# Patient Record
Sex: FEMALE | Race: WHITE | NOT HISPANIC OR LATINO | Employment: FULL TIME | URBAN - METROPOLITAN AREA
[De-identification: names, ages, dates, MRNs, and addresses within clinical notes are randomized per-mention and may not be internally consistent; named-entity substitution may affect disease eponyms.]

---

## 2017-01-05 ENCOUNTER — OFFICE VISIT (OUTPATIENT)
Dept: TRANSPLANT | Facility: CLINIC | Age: 62
End: 2017-01-05
Payer: MEDICAID

## 2017-01-05 VITALS
HEIGHT: 63 IN | WEIGHT: 121.94 LBS | DIASTOLIC BLOOD PRESSURE: 74 MMHG | SYSTOLIC BLOOD PRESSURE: 126 MMHG | HEART RATE: 102 BPM | BODY MASS INDEX: 21.61 KG/M2

## 2017-01-05 DIAGNOSIS — B25.9 CYTOMEGALOVIRUS INFECTION, UNSPECIFIED CYTOMEGALOVIRAL INFECTION TYPE: ICD-10-CM

## 2017-01-05 DIAGNOSIS — D84.9 IMMUNOSUPPRESSION: ICD-10-CM

## 2017-01-05 DIAGNOSIS — Z94.1 S/P ORTHOTOPIC HEART TRANSPLANT: Primary | ICD-10-CM

## 2017-01-05 DIAGNOSIS — Z94.1 HEART TRANSPLANTED: Primary | ICD-10-CM

## 2017-01-05 DIAGNOSIS — I10 ESSENTIAL HYPERTENSION: ICD-10-CM

## 2017-01-05 DIAGNOSIS — E03.9 HYPOTHYROIDISM (ACQUIRED): ICD-10-CM

## 2017-01-05 DIAGNOSIS — R91.8 OTHER NONSPECIFIC ABNORMAL FINDING OF LUNG FIELD: ICD-10-CM

## 2017-01-05 DIAGNOSIS — T86.21 ACUTE REJECTION OF HEART TRANSPLANT: ICD-10-CM

## 2017-01-05 LAB
DIASTOLIC DYSFUNCTION: NO
ESTIMATED PA SYSTOLIC PRESSURE: 23
RETIRED EF AND QEF - SEE NOTES: 65 (ref 55–65)
TRICUSPID VALVE REGURGITATION: NORMAL

## 2017-01-05 PROCEDURE — 93306 TTE W/DOPPLER COMPLETE: CPT | Mod: 26,,, | Performed by: INTERNAL MEDICINE

## 2017-01-05 PROCEDURE — 99213 OFFICE O/P EST LOW 20 MIN: CPT | Mod: PBBFAC | Performed by: NURSE PRACTITIONER

## 2017-01-05 PROCEDURE — 99999 PR PBB SHADOW E&M-EST. PATIENT-LVL III: CPT | Mod: PBBFAC,,, | Performed by: NURSE PRACTITIONER

## 2017-01-05 PROCEDURE — 99213 OFFICE O/P EST LOW 20 MIN: CPT | Mod: S$PBB,,, | Performed by: NURSE PRACTITIONER

## 2017-01-05 NOTE — MR AVS SNAPSHOT
[facility]  [address]  [place] [address]  Phone: [phone]                  Cassandra Gonzales   2017 1:30 PM   Appointment    Description:  Female : 1955   Provider:  La Ash NP   Department:  [facility]                To Do List           Future Appointments        Provider Department Dept Phone    2017 9:05 AM LAB, APPOINTMENT [place] [facility] [phone]    2017 1:00 PM ECHO, [facility] - Echo/Stress Lab [phone]    2017 1:30 PM La Ash NP [facility] [phone]    2017 8:30 AM LAB, APPOINTMENT [place] [facility] [phone]    2017 9:30 AM ECHO, [facility] - Echo/Stress Lab [phone]      Your Future Surgeries/Procedures     2017   Surgery with Alyssa Negron MD   [facility] ([facility])    [address] [facility] [address] [phone]              Goals (5 Years of Data)     None      [facility]     [facility] -  Assistance  Registered nurses in the [facility] provide clinical advisement, health education, appointment booking, and other advisory services.  Call for this free service at [phone].             Medications           Message regarding Medications     Verify the changes and/or additions to your medication regime listed below are the same as discussed with your clinician today.  If any of these changes or additions are incorrect, please notify your healthcare provider.             Verify that the below list of medications is an accurate representation of the medications you are currently taking.  If none reported, the list may be blank. If incorrect, please contact your healthcare provider. Carry this list with you in case of emergency.           Current Medications     amlodipine (NORVASC) 5 MG tablet  Take 1 tablet (5 mg total) by mouth once daily.    aspirin (ECOTRIN) 81 MG EC tablet Take 1 tablet (81 mg total) by mouth once daily.    atorvastatin (LIPITOR) 40 MG tablet Take 1 tablet (40 mg total) by mouth once daily.    levothyroxine (SYNTHROID) 75 MCG tablet Take 1 tablet (75 mcg total) by mouth before breakfast.    magnesium oxide (MAG-OX) 400 mg tablet Take 1 tablet (400 mg total) by mouth 2 (two) times daily.    multivitamin (THERAGRAN) tablet Take 1 tablet by mouth once daily.    mycophenolate (CELLCEPT) 250 mg Cap Take 4 capsules (1,000 mg total) by mouth 2 (two) times daily.    predniSONE (DELTASONE) 5 MG tablet Take 1 tablet (5 mg total) by mouth once daily. (START ON 12/17)    sulfamethoxazole-trimethoprim 400-80mg (BACTRIM,SEPTRA) 400-80 mg per tablet Take 1 tablet by mouth once daily. Z94.1 heart replaced by transplant    tacrolimus (PROGRAF) 1 MG Cap Take 2 capsules (2 mg total) by mouth every 12 (twelve) hours.    valganciclovir (VALCYTE) 450 mg Tab Take 1 tablet (450 mg total) by mouth once daily.           Clinical Reference Information           Vital Signs - Last Recorded     LMP                   03/01/2007           Allergies as of 1/5/2017     No Known Allergies      Immunizations Administered on Date of Encounter - 1/5/2017     None      Maintenance Dialysis History     Patient has no recorded history of maintenance dialysis.      Transplant Information        Txp Date Organ Coordinator Care Team    3/25/2016 Heart Samira Munson LPN Referring Physician:  Esha Tee MD   Corresponding Physician:  Esha Tee MD   Surgeon:  Yuriy Edge MD

## 2017-01-05 NOTE — LETTER
January 5, 2017        Esha Tee  [address]  [place] [address]  Phone: [phone]  Fax: [phone]             [facility] [address] [place] [address]  Phone: [phone]   Patient: Cassandra Gonzales   MR Number: [id number]   YOB: 1955   Date of Visit: 1/5/2017       Dear Dr. Esha Tee    Thank you for referring Cassandra Gonzales to me for evaluation. Attached you will find relevant portions of my assessment and plan of care.    If you have questions, please do not hesitate to call me. I look forward to following Cassandra Gonzales along with you.    Sincerely,    La Ash NP    Enclosure    If you would like to receive this communication electronically, please contact [email or web address] or [phone] to request [email or web address] Link access.    [email or web address] Link is a tool which provides read-only access to select patient information with whom you have a relationship. Its easy to use and provides real time access to review your patients record including encounter summaries, notes, results, and demographic information.    If you feel you have received this communication in error or would no longer like to receive these types of communications, please e-mail [email or web address]

## 2017-01-05 NOTE — PROGRESS NOTES
"Subjective:   Patient ID:  Cassandra Gonzales is a 61 y.o. female who presents for heart transplant follow-up.    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 She was asymptomatic and graft function was normal. She was given IV Solu Medrol 500 mg X 1. HTS staff and pathology staff reviewed the pathology, and there was no evidence of cellular necrosis, so she was sent home on a steroid pulse of Prednisone 30 mg bid X 2 days followed by 5 mg qd with EMBx done today likely to be followed by a slow taper. Decision was made to continue Valcyte for 3 more months (March) and Bactrim for 6 extra months (September). We are continuing to check CMV PCR's monthly. Random urine protein done today in anticipation of annual visit in March and is -ve (plan is for LHC with IVUS and consideration for Rapamune if intimal thickening is present. Rapamune would also help prevent CMV). She feels great, and has no complaints at all. Prograf continues at 2 mg bid, and Cellcept is now 1000 mg bid. TTE shows normal graft function. Labs are unremarkable.    Review of Systems   Constitution: Negative.   HENT: Negative.    Eyes: Negative.    Cardiovascular: Negative.    Respiratory: Negative.    Endocrine: Negative.    Hematologic/Lymphatic: Negative.    Skin: Negative.    Musculoskeletal: Negative.    Gastrointestinal: Negative.    Genitourinary: Negative.    Neurological: Negative.    Psychiatric/Behavioral: Negative.    Allergic/Immunologic: Negative.        Objective:     Visit Vitals    /74    Pulse 102    Ht 5' 3" (1.6 m)    Wt 55.3 kg (121 lb 14.6 oz)    LMP 03/01/2007    BMI 21.6 kg/m2 "       Physical Exam   Constitutional: She is oriented to person, place, and time. She appears well-developed and well-nourished.   HENT:   Head: Normocephalic and atraumatic.   Eyes: Conjunctivae and EOM are normal. Pupils are equal, round, and reactive to light.   Neck: Normal range of motion. Neck supple. No JVD present. No thyromegaly present.   Cardiovascular: Regular rhythm, normal heart sounds and intact distal pulses.    Tachycardic   Pulmonary/Chest: Effort normal and breath sounds normal.   Abdominal: Soft. Bowel sounds are normal.   Musculoskeletal: Normal range of motion. She exhibits no edema.   Neurological: She is alert and oriented to person, place, and time.   Skin: Skin is warm and dry.   Psychiatric: She has a normal mood and affect. Her behavior is normal. Judgment and thought content normal.         Chemistry        Component Value Date/Time     01/05/2017 0856    K 4.0 01/05/2017 0856     01/05/2017 0856    CO2 25 01/05/2017 0856    BUN 25 (H) 01/05/2017 0856    CREATININE 0.9 01/05/2017 0856    GLU 89 01/05/2017 0856        Component Value Date/Time    CALCIUM 9.2 01/05/2017 0856    ALKPHOS 94 01/05/2017 0856    AST 35 01/05/2017 0856    ALT 25 01/05/2017 0856    BILITOT 0.6 01/05/2017 0856            Magnesium   Date Value Ref Range Status   12/15/2016 1.7 1.6 - 2.6 mg/dL Final       Lab Results   Component Value Date    WBC 4.89 01/05/2017    HGB 11.0 (L) 01/05/2017    HCT 33.2 (L) 01/05/2017    MCV 92 01/05/2017     01/05/2017       Lab Results   Component Value Date    INR 1.0 10/27/2016    INR 1.0 10/26/2016    INR 1.0 10/25/2016       BNP   Date Value Ref Range Status   01/05/2017 75 0 - 99 pg/mL Final     Comment:     Values of less than 100 pg/ml are consistent with non-CHF populations.   12/01/2016 118 (H) 0 - 99 pg/mL Final     Comment:     Values of less than 100 pg/ml are consistent with non-CHF populations.   10/18/2016 50 0 - 99 pg/mL Final     Comment:      Values of less than 100 pg/ml are consistent with non-CHF populations.       No results found for: LDH    Tacrolimus Lvl   Date Value Ref Range Status   12/15/2016 10.2 5.0 - 15.0 ng/mL Final     Comment:     Testing performed by Liquid Chromatography-Tandem  Mass Spectrometry (LC-MS/MS).  This test was developed and its performance characteristics  determined by [facility], Department of Pathology  and Laboratory Medicine in a manner consistent with CLIA  requirements. It has not been cleared or approved by the [place]  Food and Drug Administration.  This test is used for clinical   purposes.  It should not be regarded as investigational or for  research.       No results found for: SIROLIMUS  No results found for: CYCLOSPORINE    Assessment:     1. Heart transplanted    2. Acute rejection of heart transplant    3. Cytomegalovirus infection, unspecified cytomegaloviral infection type    4. Immunosuppression    5. Hypothyroidism (acquired)    6. Essential hypertension        Plan:   Follow up today's immunosuppression levels with Tacro goal level 8-12. Will wean Prednisone if EMBx -ve since she is tolerating Cellcept 1000 mg bid.  RTC as scheduled for f/u with Dr. Negro 2/22/17.    Return instructions as set forth by post transplant schedule or as needed:    Clinic: Return for labs and/or biopsy weekly the first month, every two weeks during month 2 and then monthly for the first year at the provider or coordinator's discretion. During the second year, return to clinic every 3 months. Post transplant year 3-5 return every 6 months. There will be a comprehensive post transplant evaluation every year that may include LHC/RHC/biopsy, stress test, echo, CXR, and other health screening exams.    In addition to the clinical assessment, I have ordered Allomap testing for this patient to assist in identification of moderate/severe acute cellular rejection (ACR) in a pt with stable Allograft function instead of  endomyocardial biopsy.     Patient is reminded to call with any health changes as these can be early signs of transplant complications. Patient is advised to make sure any new medications or changes of old medications are discussed with a pharmacist or physician knowledgeable with transplant to avoid rejection/drug toxicity related to significant drug interactions.    UNOS Patient Status  Functional Status: 100% - Normal, no complaints, no evidence of disease  Physical Capacity: No Limitations  Working for Income: No  If no, reason not working: Unknown

## 2017-01-06 ENCOUNTER — TELEPHONE (OUTPATIENT)
Dept: TRANSPLANT | Facility: CLINIC | Age: 62
End: 2017-01-06

## 2017-01-06 NOTE — TELEPHONE ENCOUNTER
Called pt to inform her that her biopsy was negative from 1/5/17. Pt verbalized understanding. Informed pt that if there are any other updates we will call and instructed pt to call back if she has any questions or concerns.

## 2017-01-10 ENCOUNTER — TELEPHONE (OUTPATIENT)
Dept: TRANSPLANT | Facility: CLINIC | Age: 62
End: 2017-01-10

## 2017-01-17 ENCOUNTER — PATIENT MESSAGE (OUTPATIENT)
Dept: TRANSPLANT | Facility: CLINIC | Age: 62
End: 2017-01-17

## 2017-01-23 ENCOUNTER — PATIENT MESSAGE (OUTPATIENT)
Dept: TRANSPLANT | Facility: CLINIC | Age: 62
End: 2017-01-23

## 2017-01-23 RX ORDER — PREDNISONE 5 MG/1
2.5 TABLET ORAL DAILY
Qty: 30 TABLET | Refills: 0 | Status: SHIPPED | OUTPATIENT
Start: 2017-01-23 | End: 2017-02-22 | Stop reason: SDUPTHER

## 2017-01-25 ENCOUNTER — PATIENT MESSAGE (OUTPATIENT)
Dept: TRANSPLANT | Facility: CLINIC | Age: 62
End: 2017-01-25

## 2017-01-26 ENCOUNTER — TELEPHONE (OUTPATIENT)
Dept: TRANSPLANT | Facility: CLINIC | Age: 62
End: 2017-01-26

## 2017-01-30 ENCOUNTER — TELEPHONE (OUTPATIENT)
Dept: TRANSPLANT | Facility: CLINIC | Age: 62
End: 2017-01-30

## 2017-01-30 LAB
EXT ALBUMIN: 1.2
EXT ALT: 23
EXT AST: 36
EXT BUN: 22
EXT CALCIUM: 9.3
EXT CHLORIDE: 106
EXT CREATININE: 0.93 MG/DL
EXT GLUCOSE: 79
EXT HEMATOCRIT: 38.2
EXT HEMOGLOBIN: 12.3
EXT PLATELETS: 205
EXT POTASSIUM: 4.7
EXT PROTEIN TOTAL: 7.1
EXT SODIUM: 140 MMOL/L
EXT WBC: 4

## 2017-01-31 ENCOUNTER — PATIENT MESSAGE (OUTPATIENT)
Dept: TRANSPLANT | Facility: CLINIC | Age: 62
End: 2017-01-31

## 2017-01-31 ENCOUNTER — TELEPHONE (OUTPATIENT)
Dept: TRANSPLANT | Facility: CLINIC | Age: 62
End: 2017-01-31

## 2017-01-31 LAB — EXT TACROLIMUS LVL: 703

## 2017-02-01 ENCOUNTER — PATIENT MESSAGE (OUTPATIENT)
Dept: TRANSPLANT | Facility: CLINIC | Age: 62
End: 2017-02-01

## 2017-02-07 ENCOUNTER — TELEPHONE (OUTPATIENT)
Dept: TRANSPLANT | Facility: CLINIC | Age: 62
End: 2017-02-07

## 2017-02-07 LAB
EXT ALBUMIN: 4
EXT ALT: 27
EXT AST: 41
EXT BUN: 21
EXT CALCIUM: 9.8
EXT CHLORIDE: 105
EXT CREATININE: 0.9 MG/DL
EXT GLUCOSE: 87
EXT HEMATOCRIT: 37.1
EXT HEMOGLOBIN: 11.8
EXT PLATELETS: 195
EXT POTASSIUM: 4.5
EXT PROTEIN TOTAL: 7.1
EXT SODIUM: 142 MMOL/L
EXT TACROLIMUS LVL: 9.6
EXT WBC: 3.5

## 2017-02-07 NOTE — TELEPHONE ENCOUNTER
Pt had repeat labs on 2/6/17 for Prograf level of 7.3 on 1/31/17 goal is 8-10. Repeat prograf level is 9.6.   Waiting on CMV PCR, WBC dropped to 3.5

## 2017-02-09 ENCOUNTER — TELEPHONE (OUTPATIENT)
Dept: TRANSPLANT | Facility: CLINIC | Age: 62
End: 2017-02-09

## 2017-02-09 ENCOUNTER — PATIENT MESSAGE (OUTPATIENT)
Dept: TRANSPLANT | Facility: CLINIC | Age: 62
End: 2017-02-09

## 2017-02-09 LAB — EXT CMV DNA QUANT. BY PCR: NOT DETECTED

## 2017-02-12 ENCOUNTER — PATIENT MESSAGE (OUTPATIENT)
Dept: TRANSPLANT | Facility: CLINIC | Age: 62
End: 2017-02-12

## 2017-02-16 ENCOUNTER — TELEPHONE (OUTPATIENT)
Dept: TRANSPLANT | Facility: CLINIC | Age: 62
End: 2017-02-16

## 2017-02-16 LAB — EXT CMV DNA QUANT. BY PCR: NORMAL

## 2017-02-22 ENCOUNTER — HOSPITAL ENCOUNTER (OUTPATIENT)
Dept: CARDIOLOGY | Facility: CLINIC | Age: 62
Discharge: HOME | End: 2017-02-22
Payer: MEDICAID

## 2017-02-22 ENCOUNTER — OFFICE VISIT (OUTPATIENT)
Dept: TRANSPLANT | Facility: CLINIC | Age: 62
End: 2017-02-22
Payer: MEDICAID

## 2017-02-22 VITALS
WEIGHT: 121.69 LBS | BODY MASS INDEX: 22.39 KG/M2 | DIASTOLIC BLOOD PRESSURE: 72 MMHG | HEART RATE: 104 BPM | SYSTOLIC BLOOD PRESSURE: 144 MMHG | HEIGHT: 62 IN

## 2017-02-22 DIAGNOSIS — I10 ESSENTIAL HYPERTENSION: ICD-10-CM

## 2017-02-22 DIAGNOSIS — Z94.1 STATUS POST HEART TRANSPLANT: ICD-10-CM

## 2017-02-22 DIAGNOSIS — Z79.899 ENCOUNTER FOR LONG-TERM (CURRENT) USE OF MEDICATIONS: ICD-10-CM

## 2017-02-22 DIAGNOSIS — Z94.1 HEART TRANSPLANTED: Primary | ICD-10-CM

## 2017-02-22 DIAGNOSIS — Z79.60 LONG-TERM USE OF IMMUNOSUPPRESSANT MEDICATION: ICD-10-CM

## 2017-02-22 DIAGNOSIS — T38.0X5D ADVERSE EFFECT OF GLUCOCORTICOID OR SYNTHETIC ANALOGUE, SUBSEQUENT ENCOUNTER: ICD-10-CM

## 2017-02-22 DIAGNOSIS — T86.21 ACUTE REJECTION OF HEART TRANSPLANT: ICD-10-CM

## 2017-02-22 DIAGNOSIS — B25.8 OTHER CYTOMEGALOVIRAL DISEASES: ICD-10-CM

## 2017-02-22 LAB
DIASTOLIC DYSFUNCTION: NO
ESTIMATED PA SYSTOLIC PRESSURE: 26.04
RETIRED EF AND QEF - SEE NOTES: 65 (ref 55–65)
TRICUSPID VALVE REGURGITATION: NORMAL

## 2017-02-22 PROCEDURE — 99215 OFFICE O/P EST HI 40 MIN: CPT | Mod: S$PBB,,, | Performed by: INTERNAL MEDICINE

## 2017-02-22 PROCEDURE — 99999 PR PBB SHADOW E&M-EST. PATIENT-LVL III: CPT | Mod: PBBFAC,,, | Performed by: INTERNAL MEDICINE

## 2017-02-22 PROCEDURE — 99213 OFFICE O/P EST LOW 20 MIN: CPT | Mod: PBBFAC | Performed by: INTERNAL MEDICINE

## 2017-02-22 PROCEDURE — 93306 TTE W/DOPPLER COMPLETE: CPT | Mod: 26,S$PBB,, | Performed by: INTERNAL MEDICINE

## 2017-02-22 RX ORDER — PREDNISONE 1 MG/1
1 TABLET ORAL DAILY
Qty: 30 TABLET | Refills: 6 | Status: SHIPPED | OUTPATIENT
Start: 2017-02-22 | End: 2017-03-23

## 2017-02-22 NOTE — LETTER
February 22, 2017        Esha Tee  [address]  [place] [address]  Phone: [phone]  Fax: [phone]             [facility] [address] [place] [address]  Phone: [phone]   Patient: Cassandra Gonzales   MR Number: [id number]   YOB: 1955   Date of Visit: 2/22/2017       Dear Dr. Esha Tee    Thank you for referring Cassandra Gonzales to me for evaluation. Attached you will find relevant portions of my assessment and plan of care.    If you have questions, please do not hesitate to call me. I look forward to following Cassandra Gonzales along with you.    Sincerely,    Alexi Negro MD    Enclosure    If you would like to receive this communication electronically, please contact [email or web address] or [phone] to request [employer] Link access.    [employer] Link is a tool which provides read-only access to select patient information with whom you have a relationship. Its easy to use and provides real time access to review your patients record including encounter summaries, notes, results, and demographic information.    If you feel you have received this communication in error or would no longer like to receive these types of communications, please e-mail [email or web address]

## 2017-02-22 NOTE — PROGRESS NOTES
"Subjective:   Patient ID:  Cassandra Gonzales is a 61 y.o. female who presents for heart transplant follow-up.    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 She was asymptomatic and graft function was normal. She was given IV Solu Medrol 500 mg X 1. HTS staff and pathology staff reviewed the pathology, and there was no evidence of cellular necrosis, so she was sent home on a steroid pulse of Prednisone 30 mg bid X 2 days followed by 5 mg qd with EMBx done today likely to be followed by a slow taper. Decision was made to continue Valcyte for 3 more months (March) and Bactrim for 6 extra months (September). We are continuing to check CMV PCR's monthly. Random urine protein done today in anticipation of annual visit in March and is -ve (plan is for C with IVUS and consideration for Rapamune if intimal thickening is present. Rapamune would also help prevent CMV).     Doing well no complaints. Echo today is normal    Review of Systems   Constitution: Negative.   HENT: Negative.    Eyes: Negative.    Cardiovascular: Negative.    Respiratory: Negative.    Endocrine: Negative.    Hematologic/Lymphatic: Negative.    Skin: Negative.    Musculoskeletal: Negative.    Gastrointestinal: Negative.    Genitourinary: Negative.    Neurological: Negative.    Psychiatric/Behavioral: Negative.    Allergic/Immunologic: Negative.        Objective:     Visit Vitals    BP (!) 144/72    Pulse 104    Ht 5' 2" (1.575 m)    Wt 55.2 kg (121 lb 11.1 oz)    LMP 03/01/2007    BMI 22.26 kg/m2       Physical Exam   Constitutional: She is oriented to person, place, and time. She appears well-developed and " well-nourished.   HENT:   Head: Normocephalic and atraumatic.   Eyes: Conjunctivae and EOM are normal. Pupils are equal, round, and reactive to light.   Neck: Normal range of motion. Neck supple. No JVD present. No thyromegaly present.   Cardiovascular: Regular rhythm, normal heart sounds and intact distal pulses.    Tachycardic   Pulmonary/Chest: Effort normal and breath sounds normal.   Abdominal: Soft. Bowel sounds are normal.   Musculoskeletal: Normal range of motion. She exhibits no edema.   Neurological: She is alert and oriented to person, place, and time.   Skin: Skin is warm and dry.   Psychiatric: She has a normal mood and affect. Her behavior is normal. Judgment and thought content normal.         Chemistry        Component Value Date/Time     02/22/2017 0830    K 3.6 02/22/2017 0830     02/22/2017 0830    CO2 28 02/22/2017 0830    BUN 23 (H) 02/22/2017 0830    CREATININE 1.0 02/22/2017 0830    GLU 83 02/22/2017 0830        Component Value Date/Time    CALCIUM 9.5 02/22/2017 0830    ALKPHOS 82 02/22/2017 0830    AST 39 02/22/2017 0830    ALT 26 02/22/2017 0830    BILITOT 0.8 02/22/2017 0830            Magnesium   Date Value Ref Range Status   02/22/2017 1.7 1.6 - 2.6 mg/dL Final       Lab Results   Component Value Date    WBC 3.43 (L) 02/22/2017    HGB 11.9 (L) 02/22/2017    HCT 37.3 02/22/2017    MCV 94 02/22/2017     02/22/2017       Lab Results   Component Value Date    INR 1.0 10/27/2016    INR 1.0 10/26/2016    INR 1.0 10/25/2016       BNP   Date Value Ref Range Status   02/22/2017 58 0 - 99 pg/mL Final     Comment:     Values of less than 100 pg/ml are consistent with non-CHF populations.   01/05/2017 75 0 - 99 pg/mL Final     Comment:     Values of less than 100 pg/ml are consistent with non-CHF populations.   12/01/2016 118 (H) 0 - 99 pg/mL Final     Comment:     Values of less than 100 pg/ml are consistent with non-CHF populations.       No results found for: LDH    Tacrolimus  Lvl   Date Value Ref Range Status   01/05/2017 9.9 5.0 - 15.0 ng/mL Final     Comment:     Testing performed by Liquid Chromatography-Tandem  Mass Spectrometry (LC-MS/MS).  This test was developed and its performance characteristics  determined by [facility], Department of Pathology  and Laboratory Medicine in a manner consistent with CLIA  requirements. It has not been cleared or approved by the [place]  Food and Drug Administration.  This test is used for clinical   purposes.  It should not be regarded as investigational or for  research.       No results found for: SIROLIMUS  No results found for: CYCLOSPORINE    Assessment:     1. Heart transplanted    2. Essential hypertension    3. Acute rejection of heart transplant    4. Other cytomegaloviral diseases    5. Adverse effect of glucocorticoid or synthetic analogue, subsequent encounter    6. Long-term use of immunosuppressant medication        Plan:   Annual evaluation  Stable    Return instructions as set forth by post transplant schedule or as needed:    Clinic: Return for labs and/or biopsy weekly the first month, every two weeks during month 2 and then monthly for the first year at the provider or coordinator's discretion. During the second year, return to clinic every 3 months. Post transplant year 3-5 return every 6 months. There will be a comprehensive post transplant evaluation every year that may include LHC/RHC/biopsy, stress test, echo, CXR, and other health screening exams.    In addition to the clinical assessment, I have ordered Allomap testing for this patient to assist in identification of moderate/severe acute cellular rejection (ACR) in a pt with stable Allograft function instead of endomyocardial biopsy.     Patient is reminded to call with any health changes as these can be early signs of transplant complications. Patient is advised to make sure any new medications or changes of old medications are discussed with a pharmacist or  physician knowledgeable with transplant to avoid rejection/drug toxicity related to significant drug interactions.    UNOS Patient Status  Functional Status: 100% - Normal, no complaints, no evidence of disease  Physical Capacity: No Limitations  Working for Income: No  If no, reason not working: Unknown

## 2017-02-22 NOTE — PROGRESS NOTES
"Subjective:   Patient ID:  Cassandra Gonzales is a 61 y.o. female who presents for heart transplant follow-up.    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 She was asymptomatic and graft function was normal. She was given IV Solu Medrol 500 mg X 1. HTS staff and pathology staff reviewed the pathology, and there was no evidence of cellular necrosis, so she was sent home on a steroid pulse of Prednisone 30 mg bid X 2 days followed by 5 mg qd with EMBx done today likely to be followed by a slow taper. Decision was made to continue Valcyte for 3 more months (March) and Bactrim for 6 extra months (September). We are continuing to check CMV PCR's monthly. Random urine protein done today in anticipation of annual visit in March and is -ve (plan is for C with IVUS and consideration for Rapamune if intimal thickening is present. Rapamune would also help prevent CMV).     Review of Systems   Constitution: Negative.   HENT: Negative.    Eyes: Negative.    Cardiovascular: Negative.    Respiratory: Negative.    Endocrine: Negative.    Hematologic/Lymphatic: Negative.    Skin: Negative.    Musculoskeletal: Negative.    Gastrointestinal: Negative.    Genitourinary: Negative.    Neurological: Negative.    Psychiatric/Behavioral: Negative.    Allergic/Immunologic: Negative.        Objective:     Visit Vitals    BP (!) 144/72    Pulse 104    Ht 5' 2" (1.575 m)    Wt 55.2 kg (121 lb 11.1 oz)    LMP 03/01/2007    BMI 22.26 kg/m2       Physical Exam   Constitutional: She is oriented to person, place, and time. She appears well-developed and well-nourished.   HENT:   Head: Normocephalic and " atraumatic.   Eyes: Conjunctivae and EOM are normal. Pupils are equal, round, and reactive to light.   Neck: Normal range of motion. Neck supple. No JVD present. No thyromegaly present.   Cardiovascular: Regular rhythm, normal heart sounds and intact distal pulses.    Tachycardic   Pulmonary/Chest: Effort normal and breath sounds normal.   Abdominal: Soft. Bowel sounds are normal.   Musculoskeletal: Normal range of motion. She exhibits no edema.   Neurological: She is alert and oriented to person, place, and time.   Skin: Skin is warm and dry.   Psychiatric: She has a normal mood and affect. Her behavior is normal. Judgment and thought content normal.         Chemistry        Component Value Date/Time     02/22/2017 0830    K 3.6 02/22/2017 0830     02/22/2017 0830    CO2 28 02/22/2017 0830    BUN 23 (H) 02/22/2017 0830    CREATININE 1.0 02/22/2017 0830    GLU 83 02/22/2017 0830        Component Value Date/Time    CALCIUM 9.5 02/22/2017 0830    ALKPHOS 82 02/22/2017 0830    AST 39 02/22/2017 0830    ALT 26 02/22/2017 0830    BILITOT 0.8 02/22/2017 0830            Magnesium   Date Value Ref Range Status   02/22/2017 1.7 1.6 - 2.6 mg/dL Final       Lab Results   Component Value Date    WBC 3.43 (L) 02/22/2017    HGB 11.9 (L) 02/22/2017    HCT 37.3 02/22/2017    MCV 94 02/22/2017     02/22/2017       Lab Results   Component Value Date    INR 1.0 10/27/2016    INR 1.0 10/26/2016    INR 1.0 10/25/2016       BNP   Date Value Ref Range Status   02/22/2017 58 0 - 99 pg/mL Final     Comment:     Values of less than 100 pg/ml are consistent with non-CHF populations.   01/05/2017 75 0 - 99 pg/mL Final     Comment:     Values of less than 100 pg/ml are consistent with non-CHF populations.   12/01/2016 118 (H) 0 - 99 pg/mL Final     Comment:     Values of less than 100 pg/ml are consistent with non-CHF populations.       No results found for: LDH    Tacrolimus Lvl   Date Value Ref Range Status   01/05/2017 9.9  5.0 - 15.0 ng/mL Final     Comment:     Testing performed by Liquid Chromatography-Tandem  Mass Spectrometry (LC-MS/MS).  This test was developed and its performance characteristics  determined by [facility], Department of Pathology  and Laboratory Medicine in a manner consistent with CLIA  requirements. It has not been cleared or approved by the [place]  Food and Drug Administration.  This test is used for clinical   purposes.  It should not be regarded as investigational or for  research.       No results found for: SIROLIMUS  No results found for: CYCLOSPORINE    Assessment:     1. Heart transplanted    2. Essential hypertension    3. Acute rejection of heart transplant    4. Other cytomegaloviral diseases    5. Adverse effect of glucocorticoid or synthetic analogue, subsequent encounter    6. Long-term use of immunosuppressant medication        Plan:   Follow up today's immunosuppression levels with Tacro goal level 8-12. Will wean Prednisone if EMBx -ve since she is tolerating Cellcept 1000 mg bid.  RTC as scheduled for f/u with Dr. Negro 2/22/17.    Return instructions as set forth by post transplant schedule or as needed:    Clinic: Return for labs and/or biopsy weekly the first month, every two weeks during month 2 and then monthly for the first year at the provider or coordinator's discretion. During the second year, return to clinic every 3 months. Post transplant year 3-5 return every 6 months. There will be a comprehensive post transplant evaluation every year that may include LHC/RHC/biopsy, stress test, echo, CXR, and other health screening exams.    In addition to the clinical assessment, I have ordered Allomap testing for this patient to assist in identification of moderate/severe acute cellular rejection (ACR) in a pt with stable Allograft function instead of endomyocardial biopsy.     Patient is reminded to call with any health changes as these can be early signs of transplant  complications. Patient is advised to make sure any new medications or changes of old medications are discussed with a pharmacist or physician knowledgeable with transplant to avoid rejection/drug toxicity related to significant drug interactions.    UNOS Patient Status  Functional Status: 100% - Normal, no complaints, no evidence of disease  Physical Capacity: No Limitations  Working for Income: No  If no, reason not working: Unknown

## 2017-02-24 ENCOUNTER — PATIENT MESSAGE (OUTPATIENT)
Dept: TRANSPLANT | Facility: CLINIC | Age: 62
End: 2017-02-24

## 2017-02-24 ENCOUNTER — TELEPHONE (OUTPATIENT)
Dept: TRANSPLANT | Facility: CLINIC | Age: 62
End: 2017-02-24

## 2017-02-24 NOTE — TELEPHONE ENCOUNTER
Called pt to inform her that we reviewed her chart in chart review.. Per review pt is to decrease Prednisone to 1 mg daily and get local repeat labs d/t CMV PCR not drawn on appt date. No answer. LM on  informing pt of the following and to call back if she has any questions or concerns.     Also sent [facility] message.

## 2017-03-02 ENCOUNTER — PATIENT MESSAGE (OUTPATIENT)
Dept: TRANSPLANT | Facility: CLINIC | Age: 62
End: 2017-03-02

## 2017-03-08 ENCOUNTER — PATIENT MESSAGE (OUTPATIENT)
Dept: TRANSPLANT | Facility: CLINIC | Age: 62
End: 2017-03-08

## 2017-03-10 ENCOUNTER — TELEPHONE (OUTPATIENT)
Dept: TRANSPLANT | Facility: CLINIC | Age: 62
End: 2017-03-10

## 2017-03-13 ENCOUNTER — TELEPHONE (OUTPATIENT)
Dept: TRANSPLANT | Facility: CLINIC | Age: 62
End: 2017-03-13

## 2017-03-13 ENCOUNTER — PATIENT MESSAGE (OUTPATIENT)
Dept: TRANSPLANT | Facility: CLINIC | Age: 62
End: 2017-03-13

## 2017-03-13 LAB
EXT ALBUMIN: 4
EXT ALT: 31
EXT AST: 46
EXT BUN: 20
EXT CALCIUM: 9.3
EXT CHLORIDE: 105
EXT CMV DNA QUANT. BY PCR: NORMAL
EXT CREATININE: 0.97 MG/DL
EXT GLUCOSE: 79
EXT HEMATOCRIT: 38.1
EXT HEMOGLOBIN: 12.3
EXT PLATELETS: 192
EXT POTASSIUM: 4.9
EXT PROTEIN TOTAL: 6.8
EXT SODIUM: 140 MMOL/L
EXT WBC: 3.3

## 2017-03-22 ENCOUNTER — DOCUMENTATION ONLY (OUTPATIENT)
Dept: CARDIOLOGY | Facility: CLINIC | Age: 62
End: 2017-03-22

## 2017-03-22 ENCOUNTER — HOSPITAL ENCOUNTER (OUTPATIENT)
Dept: RADIOLOGY | Facility: HOSPITAL | Age: 62
Discharge: HOME | End: 2017-03-22
Attending: INTERNAL MEDICINE
Payer: MEDICAID

## 2017-03-22 ENCOUNTER — HOSPITAL ENCOUNTER (OUTPATIENT)
Dept: CARDIOLOGY | Facility: CLINIC | Age: 62
Discharge: HOME | End: 2017-03-22
Payer: MEDICAID

## 2017-03-22 ENCOUNTER — OFFICE VISIT (OUTPATIENT)
Dept: CARDIOLOGY | Facility: CLINIC | Age: 62
End: 2017-03-22
Payer: MEDICAID

## 2017-03-22 ENCOUNTER — OFFICE VISIT (OUTPATIENT)
Dept: TRANSPLANT | Facility: CLINIC | Age: 62
End: 2017-03-22
Payer: MEDICAID

## 2017-03-22 VITALS
DIASTOLIC BLOOD PRESSURE: 76 MMHG | HEIGHT: 63 IN | HEART RATE: 98 BPM | BODY MASS INDEX: 21.76 KG/M2 | WEIGHT: 122.81 LBS | SYSTOLIC BLOOD PRESSURE: 138 MMHG

## 2017-03-22 VITALS
BODY MASS INDEX: 21.76 KG/M2 | SYSTOLIC BLOOD PRESSURE: 115 MMHG | HEIGHT: 63 IN | HEART RATE: 103 BPM | DIASTOLIC BLOOD PRESSURE: 83 MMHG | WEIGHT: 122.81 LBS

## 2017-03-22 DIAGNOSIS — E03.9 HYPOTHYROIDISM, UNSPECIFIED TYPE: ICD-10-CM

## 2017-03-22 DIAGNOSIS — Z94.1 HEART TRANSPLANTED: ICD-10-CM

## 2017-03-22 DIAGNOSIS — E03.9 HYPOTHYROIDISM (ACQUIRED): ICD-10-CM

## 2017-03-22 DIAGNOSIS — B25.8 OTHER CYTOMEGALOVIRAL DISEASES: ICD-10-CM

## 2017-03-22 DIAGNOSIS — I10 ESSENTIAL HYPERTENSION: ICD-10-CM

## 2017-03-22 DIAGNOSIS — Z94.1 STATUS POST HEART TRANSPLANT: ICD-10-CM

## 2017-03-22 DIAGNOSIS — Z79.899 ENCOUNTER FOR LONG-TERM (CURRENT) USE OF MEDICATIONS: ICD-10-CM

## 2017-03-22 DIAGNOSIS — D72.819 LEUKOPENIA, UNSPECIFIED TYPE: ICD-10-CM

## 2017-03-22 DIAGNOSIS — I10 ESSENTIAL (PRIMARY) HYPERTENSION: ICD-10-CM

## 2017-03-22 DIAGNOSIS — E78.5 HYPERLIPIDEMIA, UNSPECIFIED HYPERLIPIDEMIA TYPE: ICD-10-CM

## 2017-03-22 DIAGNOSIS — D84.9 IMMUNOSUPPRESSION: ICD-10-CM

## 2017-03-22 DIAGNOSIS — Z94.1 HEART TRANSPLANTED: Primary | ICD-10-CM

## 2017-03-22 DIAGNOSIS — T38.0X5D ADVERSE EFFECT OF GLUCOCORTICOID OR SYNTHETIC ANALOGUE, SUBSEQUENT ENCOUNTER: ICD-10-CM

## 2017-03-22 DIAGNOSIS — Z94.1 STATUS POST HEART TRANSPLANT: Primary | ICD-10-CM

## 2017-03-22 DIAGNOSIS — Z79.60 LONG-TERM USE OF IMMUNOSUPPRESSANT MEDICATION: ICD-10-CM

## 2017-03-22 LAB
DIASTOLIC DYSFUNCTION: NO
ESTIMATED PA SYSTOLIC PRESSURE: 22
MITRAL VALVE MOBILITY: NORMAL
RETIRED EF AND QEF - SEE NOTES: 65 (ref 55–65)
TRICUSPID VALVE REGURGITATION: NORMAL

## 2017-03-22 PROCEDURE — 99213 OFFICE O/P EST LOW 20 MIN: CPT | Mod: S$PBB,,, | Performed by: INTERNAL MEDICINE

## 2017-03-22 PROCEDURE — 93306 TTE W/DOPPLER COMPLETE: CPT | Mod: 26,S$PBB,, | Performed by: INTERNAL MEDICINE

## 2017-03-22 PROCEDURE — 99999 PR PBB SHADOW E&M-EST. PATIENT-LVL III: CPT | Mod: PBBFAC,,, | Performed by: INTERNAL MEDICINE

## 2017-03-22 PROCEDURE — 99213 OFFICE O/P EST LOW 20 MIN: CPT | Mod: PBBFAC,27 | Performed by: INTERNAL MEDICINE

## 2017-03-22 PROCEDURE — 99999 PR PBB SHADOW E&M-EST. PATIENT-LVL III: CPT | Mod: PBBFAC,,, | Performed by: PHYSICIAN ASSISTANT

## 2017-03-22 PROCEDURE — 99214 OFFICE O/P EST MOD 30 MIN: CPT | Mod: S$PBB,,, | Performed by: PHYSICIAN ASSISTANT

## 2017-03-22 PROCEDURE — 71020 XR CHEST PA AND LATERAL: CPT | Mod: 26,,, | Performed by: RADIOLOGY

## 2017-03-22 RX ORDER — SODIUM CHLORIDE 9 MG/ML
3 INJECTION, SOLUTION INTRAVENOUS CONTINUOUS
Status: CANCELLED | OUTPATIENT
Start: 2017-03-22 | End: 2017-03-22

## 2017-03-22 RX ORDER — AMLODIPINE BESYLATE 5 MG/1
5 TABLET ORAL DAILY
Qty: 30 TABLET | Refills: 11 | Status: SHIPPED | OUTPATIENT
Start: 2017-03-22 | End: 2017-05-16 | Stop reason: ALTCHOICE

## 2017-03-22 RX ORDER — SULFAMETHOXAZOLE AND TRIMETHOPRIM 400; 80 MG/1; MG/1
1 TABLET ORAL DAILY
Qty: 30 TABLET | Refills: 11 | Status: ON HOLD | OUTPATIENT
Start: 2017-03-22 | End: 2017-08-10

## 2017-03-22 RX ORDER — DIPHENHYDRAMINE HCL 25 MG
50 CAPSULE ORAL ONCE
Status: CANCELLED | OUTPATIENT
Start: 2017-03-22 | End: 2017-03-22

## 2017-03-22 RX ORDER — ATORVASTATIN CALCIUM 40 MG/1
40 TABLET, FILM COATED ORAL DAILY
Qty: 30 TABLET | Refills: 11 | Status: SHIPPED | OUTPATIENT
Start: 2017-03-22 | End: 2018-03-15 | Stop reason: SDUPTHER

## 2017-03-22 NOTE — PROGRESS NOTES
OUTPATIENT CATHETERIZATION INSTRUCTIONS    You have been scheduled for a procedure in the catheterization lab on Friday, April 28, 2017.     Please report to the Cardiology Waiting Area on the Third floor of the hospital and check in at 6 AM.   You will then be taken to the SSCU (Short Stay Cardiac Unit) and prepared for your procedure. Please be aware that this is not the time of your procedure but the time you are to arrive. The procedures are scheduled on an hourly basis; however, emergency cases take precedence over all other cases.       You may not have anything to eat or drink after midnight the night before your test. You may take your regular morning medications with water. If there are any medications that you should not take you will be instructed to hold them that morning. If you are diabetic and on Metformin (Glucophage) do not take it the day before, the day of, and for 2 days after your procedure.      The procedure will take 1-2 hours to perform. After the procedure, you will return to SSCU on the third floor of the hospital. You will need to lie still (or keep your arm still) for the next 4 to 6 hours to minimize bleeding from the puncture site. Your family may remain in the room with you during this time.       You may be able to be discharged home that same afternoon if there is someone to drive you home and there were no complications. If you have one of the balloon, stent, or device procedures you may spend the night in the hospital. Your doctor will determine, based on your progress, the date and time of your discharge. The results of your procedure will be discussed with you before you are discharged. Any further testing or procedures will be scheduled for you either before you leave or you will be called with these appointments.       If you should have any questions, concerns, or need to change the date of your procedure, please call  SUSSY Gonzalez @ [phone]    Special  Instructions:  None        THE ABOVE INSTRUCTIONS WERE GIVEN TO THE PATIENT VERBALLY AND THEY VERBALIZED UNDERSTANDING.  THEY DO NOT REQUIRE ANY SPECIAL NEEDS AND DO NOT HAVE ANY LEARNING BARRIERS.          Directions for Reporting to Cardiology Waiting Area in the Hospital  If you park in the Parking Garage:  Take elevators to the1st floor of the parking garage.  Continue past the gift shop, coffee shop, and piano.  Take a right and go to the gold elevators. (Elevator B)  Take the elevator to the 3rd floor.  Follow the arrow on the sign on the wall that says Cath Lab Registration/EP Lab Registration.  Follow the long hallway all the way around until you come to a big open area.  This is the registration area.  Check in at Reception Desk.    OR    If family is dropping you off:  Have them drop you off at the front of the Hospital under the green overhang.  Enter through the doors and take a right.  Take the E elevators to the 3rd floor Cardiology Waiting Area.  Check in at the Reception Desk in the waiting room.

## 2017-03-22 NOTE — PROGRESS NOTES
Cardiology Clinic Note  Reason for Visit: 1 year surveillance for CAV and RVBx s/p OHT  Referring MD: Dr Negro    HPI:   60 yo lady with peripartum CMP s/p OHT 2016 referred for surveillance coronary angiogram for CAV and RHC and biopsy. She has h/o HTN, hypothyroid and HLD.  She has been walking daily and keeping a track of her activity with a Fitbit.    She denies any CP, palpitations, BEASLEY, orthopnea, leg edema, syncopal events    Prior to her OHT, she had an MVRepair in  and MVReplacement in       ROS:    Constitution: Negative for fever or chills. Negative for weight loss or gain.   HENT: Negative for sore throat or headaches. Negative for rhinorrhea.  Eyes: Negative for blurred or double vision.   Cardiovascular: See above  Pulmonary: Negative for SOB. Negative for cough.   Gastrointestinal: Negative for abdominal pain. Negative for nausea/ vomiting. Negative for diarrhea.   : Negative for dysuria.   Neurological: Negative for focal weakness or sensory changes.  PMH:     Past Medical History:   Diagnosis Date    Anticoagulant long-term use     Cardiomyopathy, dilated, nonischemic 2015    CHF (congestive heart failure)     CHF (NYHA class III, ACC/AHA stage C) 2015    Encounter for blood transfusion     Hypertension     Hypothyroidism (acquired)     Peripartum cardiomyopathy 2015    Stroke     TIA in 2014     Past Surgical History:   Procedure Laterality Date    CARDIAC DEFIBRILLATOR PLACEMENT      CARDIAC DEFIBRILLATOR REMOVAL  2016    CARDIAC VALVE REPLACEMENT       SECTION      heart transplanted  2016    right breast tumor       Allergies:   Review of patient's allergies indicates:  No Known Allergies  Medications:     Current Outpatient Prescriptions on File Prior to Visit   Medication Sig Dispense Refill    aspirin (ECOTRIN) 81 MG EC tablet Take 1 tablet (81 mg total) by mouth once daily. 30 tablet 11    levothyroxine (SYNTHROID) 75  MCG tablet Take 1 tablet (75 mcg total) by mouth before breakfast. 30 tablet 11    magnesium oxide (MAG-OX) 400 mg tablet Take 1 tablet (400 mg total) by mouth 2 (two) times daily. 60 tablet 11    multivitamin (THERAGRAN) tablet Take 1 tablet by mouth once daily.      mycophenolate (CELLCEPT) 250 mg Cap Take 4 capsules (1,000 mg total) by mouth 2 (two) times daily. 240 capsule 11    predniSONE (DELTASONE) 1 MG tablet Take 1 tablet (1 mg total) by mouth once daily. 30 tablet 6    tacrolimus (PROGRAF) 1 MG Cap Take 2 capsules (2 mg total) by mouth every 12 (twelve) hours. 240 capsule 11    valganciclovir (VALCYTE) 450 mg Tab Take 1 tablet (450 mg total) by mouth once daily. 30 tablet 2    [DISCONTINUED] amlodipine (NORVASC) 5 MG tablet Take 1 tablet (5 mg total) by mouth once daily. 30 tablet 11    [DISCONTINUED] atorvastatin (LIPITOR) 40 MG tablet Take 1 tablet (40 mg total) by mouth once daily. 30 tablet 11    [DISCONTINUED] sulfamethoxazole-trimethoprim 400-80mg (BACTRIM,SEPTRA) 400-80 mg per tablet Take 1 tablet by mouth once daily. Z94.1 heart replaced by transplant 30 tablet 11     No current facility-administered medications on file prior to visit.      Social History:     Social History   Substance Use Topics    Smoking status: Never Smoker    Smokeless tobacco: Not on file    Alcohol use No     Family History:     Family History   Problem Relation Age of Onset    Heart disease Mother     Hypertension Mother     Cancer Father     No Known Problems Sister     No Known Problems Brother     No Known Problems Maternal Aunt     No Known Problems Maternal Uncle     No Known Problems Paternal Aunt     No Known Problems Paternal Uncle     No Known Problems Maternal Grandmother     No Known Problems Maternal Grandfather     No Known Problems Paternal Grandmother     No Known Problems Paternal Grandfather     Anemia Neg Hx     Arrhythmia Neg Hx     Asthma Neg Hx     Clotting disorder Neg Hx   "   Fainting Neg Hx     Heart attack Neg Hx     Heart failure Neg Hx     Hyperlipidemia Neg Hx     Stroke Neg Hx     Atrial Septal Defect Neg Hx      Physical Exam:     Vitals:    03/22/17 1123 03/22/17 1127   BP: 127/69 138/76   Pulse: 98    Weight: 55.7 kg (122 lb 12.7 oz)    Height: 5' 3" (1.6 m)        Constitutional: No acute distress, conversant  HEENT: Sclera anicteric, Pupils equal, round and reactive to light, extraocular motions intact, Oropharynx clear  Neck: No JVD, no carotid bruits  Cardiovascular: regular rate and rhythm, no murmur, rubs or gallops, normal S1/S2  Pulmonary: Clear to auscultation bilaterally  Abdominal: Abdomen soft, nontender, nondistended, positive bowel sounds  Extremities: No lower extremity edema,   Pulses: 2+ BL Radial, 2+ BL carotid, 2+ BL DP, 2+ BL PT, normal Allens Test BL  Skin: No ecchymosis, erythema, or ulcers  Psych: Alert and oriented x 3, appropriate affect  Neuro: CNII-XII intact, no focal deficits    Labs:     Lab Results   Component Value Date     03/22/2017    K 3.8 03/22/2017     03/22/2017    CO2 24 03/22/2017    BUN 26 (H) 03/22/2017    CREATININE 0.9 03/22/2017    ANIONGAP 13 03/22/2017     Lab Results   Component Value Date    AST 39 03/22/2017    ALT 23 03/22/2017    ALKPHOS 87 03/22/2017    BILITOT 0.7 03/22/2017    BILIDIR 0.3 07/02/2015    ALBUMIN 4.1 03/22/2017     Lab Results   Component Value Date    CALCIUM 9.4 03/22/2017    MG 2.0 03/22/2017    PHOS 3.5 12/15/2016     Lab Results   Component Value Date    BNP 76 03/22/2017    BNP 58 02/22/2017    BNP 75 01/05/2017    Lab Results   Component Value Date    WBC 3.22 (L) 03/22/2017    HGB 12.0 03/22/2017    HCT 35.7 (L) 03/22/2017    HCT 32 (L) 03/25/2016     03/22/2017    GRAN 1.3 (L) 03/22/2017    GRAN 39.4 03/22/2017     Lab Results   Component Value Date    INR 1.0 10/27/2016     Lab Results   Component Value Date    CHOL 161 03/22/2017    HDL 76 (H) 03/22/2017    LDLCALC 70.8 " 03/22/2017    TRIG 71 03/22/2017     Lab Results   Component Value Date    HGBA1C 5.4 03/23/2016     Lab Results   Component Value Date    TSH 2.583 10/23/2016          Imaging:       EF   Date Value Ref Range Status   03/22/2017 65 55 - 65    02/22/2017 65 55 - 65    01/05/2017 65 55 - 65          Assessment:     Plan:   60 yo lady with peripartum CMP s/p OHT March 2016 referred for surveillance coronary angiogram for CAV and RHC and biopsy.     1) S/P OHT - plan for R/LHC with IVUS and Bx via R CFV and R CFA; 6F sheath; JL4 and JR4                      - this will be a diagnostic cath only and we will not do and adhoc PCI                      - The risks, benefits, and alternatives of coronary vascular angiography and possible intervention were discussed with pt. All questions were answered and informed consent was obtained. I had a detailed discussion with the patient regarding risk of stroke, MI, bleeding access site complications, renal failure, emergent need for heart surgery, acute limb complications including ischemia and loss, contrast allergy and death. Pt understands the risks and benefits of the procedure and wishes to proceed.     2) HTN - BP is controlled; no change in meds    3) HLD - lipid panel reviewed from today; continue lipitor    All of patient's questions were answered    Signed:  Gary Vieyra MD  Interventional Cardiology Fellow  [phone]  3/22/2017 11:59 AM    I have personally taken the history and examined this patient and agree with the resident's note as stated above.  All of the patient's questions were answered.

## 2017-03-22 NOTE — LETTER
March 22, 2017        Esha Tee  [address]  [place] [address]  Phone: [phone]  Fax: [phone]             [facility] [address] [place] [address]  Phone: [phone]   Patient: Cassandra Gonzales   MR Number: [id number]   YOB: 1955   Date of Visit: 3/22/2017       Dear Dr. Esha Tee    Thank you for referring Cassandra Gonzales to me for evaluation. Attached you will find relevant portions of my assessment and plan of care.    If you have questions, please do not hesitate to call me. I look forward to following Cassandra Gonzales along with you.    Sincerely,    PANCHITO Dillardosure    If you would like to receive this communication electronically, please contact [email or web address] or [phone] to request [employer] Link access.    [employer] is a tool which provides read-only access to select patient information with whom you have a relationship. Its easy to use and provides real time access to review your patients record including encounter summaries, notes, results, and demographic information.    If you feel you have received this communication in error or would no longer like to receive these types of communications, please e-mail [email or web address]

## 2017-03-22 NOTE — MR AVS SNAPSHOT
[facility]  [address]  [place] [address]  Phone: [phone]                  Cassandra Gonzales   3/22/2017 11:00 AM   Office Visit    Description:  Female : 1955   Provider:  Baldomero Jacobsen MD   Department:  [facility]           Reason for Visit     Heart Transplant Follow-up           Diagnoses this Visit        Comments    Status post heart transplant    -  Primary     Essential (primary) hypertension         Hypothyroidism (acquired)         Hyperlipidemia, unspecified hyperlipidemia type                To Do List           Future Appointments        Provider Department Dept Phone    2017 6:00 AM [address] [facility] [phone]      Your Future Surgeries/Procedures     2017   Surgery with Baldomero Jacobsen MD   [facility]    [address] [address] [phone]              Goals (5 Years of Data)     None      [facility] On Call     [facility] Care Line -  Assistance  Registered nurses in the [facility] provide clinical advisement, health education, appointment booking, and other advisory services.  Call for this free service at [phone].             Medications           Message regarding Medications     Verify the changes and/or additions to your medication regime listed below are the same as discussed with your clinician today.  If any of these changes or additions are incorrect, please notify your healthcare provider.             Verify that the below list of medications is an accurate representation of the medications you are currently taking.  If none reported, the list may be blank. If incorrect, please contact your healthcare provider. Carry this list with you in case of emergency.           Current Medications     amlodipine (NORVASC) 5 MG tablet Take 1 tablet (5 mg total) by mouth  "once daily.    aspirin (ECOTRIN) 81 MG EC tablet Take 1 tablet (81 mg total) by mouth once daily.    atorvastatin (LIPITOR) 40 MG tablet Take 1 tablet (40 mg total) by mouth once daily.    levothyroxine (SYNTHROID) 75 MCG tablet Take 1 tablet (75 mcg total) by mouth before breakfast.    magnesium oxide (MAG-OX) 400 mg tablet Take 1 tablet (400 mg total) by mouth 2 (two) times daily.    multivitamin (THERAGRAN) tablet Take 1 tablet by mouth once daily.    mycophenolate (CELLCEPT) 250 mg Cap Take 4 capsules (1,000 mg total) by mouth 2 (two) times daily.    predniSONE (DELTASONE) 1 MG tablet Take 1 tablet (1 mg total) by mouth once daily.    sulfamethoxazole-trimethoprim 400-80mg (BACTRIM,SEPTRA) 400-80 mg per tablet Take 1 tablet by mouth once daily. Z94.1 heart replaced by transplant    tacrolimus (PROGRAF) 1 MG Cap Take 2 capsules (2 mg total) by mouth every 12 (twelve) hours.    valganciclovir (VALCYTE) 450 mg Tab Take 1 tablet (450 mg total) by mouth once daily.           Clinical Reference Information           Your Vitals Were     BP Pulse Height Weight Last Period BMI    138/76 98 5' 3" (1.6 m) 55.7 kg (122 lb 12.7 oz) 03/01/2007 21.75 kg/m2      Blood Pressure          Most Recent Value    BP  138/76 [LT]      Allergies as of 3/22/2017     No Known Allergies      Immunizations Administered on Date of Encounter - 3/22/2017     None      Orders Placed During Today's Visit     Future Labs/Procedures Expected by Expires    Type & Screen  3/22/2017 5/21/2018      Maintenance Dialysis History     Patient has no recorded history of maintenance dialysis.      Transplant Information        Txp Date Organ Coordinator Care Team    3/25/2016 Heart Samira Munson LPN Referring Physician:  Esha Tee MD   Corresponding Physician:  Esha Tee MD   Surgeon:  Yuriy Edge MD         Language Assistance Services     ATTENTION: Language assistance services are available, free of charge. Please call " [phone].      ATENCIÓN: Si habla español, tiene a oakley disposición servicios gratuitos de asistencia lingüística. Llame al [phone].     CHÚ Ý: N?u b?n nói Ti?ng Vi?t, có các d?ch v? h? tr? ngôn ng? mi?n phí dành cho b?n. G?i s? [phone].         [facility] complies with applicable Federal civil rights laws and does not discriminate on the basis of race, color, national origin, age, disability, or sex.

## 2017-03-22 NOTE — MR AVS SNAPSHOT
[facility]  [address]  [facility] [address]  Phone: [phone]                  Cassandra Gonzales   3/22/2017 10:30 AM   Appointment    Description:  Female : 1955   Provider:  Mae Leonard PA-C   Department:  [facility]                To Do List           Future Appointments        Provider Department Dept Phone    3/22/2017 10:30 AM Mae Leonard PA-C [facility] [phone]    3/22/2017 11:00 AM Baldomero Jacobsen MD [facility] [phone]      Goals (5 Years of Data)     None      [facility]     [facility] Nurse Care Line -  Assistance  Registered nurses in the [facility] provide clinical advisement, health education, appointment booking, and other advisory services.  Call for this free service at [phone].             Medications           Message regarding Medications     Verify the changes and/or additions to your medication regime listed below are the same as discussed with your clinician today.  If any of these changes or additions are incorrect, please notify your healthcare provider.             Verify that the below list of medications is an accurate representation of the medications you are currently taking.  If none reported, the list may be blank. If incorrect, please contact your healthcare provider. Carry this list with you in case of emergency.           Current Medications     amlodipine (NORVASC) 5 MG tablet Take 1 tablet (5 mg total) by mouth once daily.    aspirin (ECOTRIN) 81 MG EC tablet Take 1 tablet (81 mg total) by mouth once daily.    atorvastatin (LIPITOR) 40 MG tablet Take 1 tablet (40 mg total) by mouth once daily.    levothyroxine (SYNTHROID) 75 MCG tablet Take 1 tablet (75 mcg total) by mouth before breakfast.    magnesium oxide (MAG-OX) 400 mg tablet Take 1 tablet (400 mg total) by mouth 2 (two) times daily.    multivitamin (THERAGRAN) tablet Take 1  tablet by mouth once daily.    mycophenolate (CELLCEPT) 250 mg Cap Take 4 capsules (1,000 mg total) by mouth 2 (two) times daily.    predniSONE (DELTASONE) 1 MG tablet Take 1 tablet (1 mg total) by mouth once daily.    sulfamethoxazole-trimethoprim 400-80mg (BACTRIM,SEPTRA) 400-80 mg per tablet Take 1 tablet by mouth once daily. Z94.1 heart replaced by transplant    tacrolimus (PROGRAF) 1 MG Cap Take 2 capsules (2 mg total) by mouth every 12 (twelve) hours.    valganciclovir (VALCYTE) 450 mg Tab Take 1 tablet (450 mg total) by mouth once daily.           Clinical Reference Information           Your Vitals Were     Last Period                   03/01/2007           Allergies as of 3/22/2017     No Known Allergies      Immunizations Administered on Date of Encounter - 3/22/2017     None      Maintenance Dialysis History     Patient has no recorded history of maintenance dialysis.      Transplant Information        Txp Date Organ Coordinator Care Team    3/25/2016 Heart Samira Munson LPN Referring Physician:  Esha Tee MD   Corresponding Physician:  Esha Tee MD   Surgeon:  Yuriy Edge MD         Language Assistance Services     ATTENTION: Language assistance services are available, free of charge. Please call [phone].      ATENCIÓN: Si habla arash, tiene a oakley disposición servicios gratuitos de asistencia lingüística. Llame al [phone].     CHÚ Ý: N?u b?n nói Ti?ng Vi?t, có các d?ch v? h? tr? ngôn ng? mi?n phí dành cho b?n. G?i s? [phone].         [facility] complies with applicable Federal civil rights laws and does not discriminate on the basis of race, color, national origin, age, disability, or sex.

## 2017-03-22 NOTE — PROGRESS NOTES
Subjective:   Patient ID:  Cassandra Gonzales is a 61 y.o. female who presents for heart transplant follow-up.    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  Patient has had one episode of rejection 12/2016 by EMBx which revealed ISHLT 2 pAMR 0 in the setting of elevated allomap of 34 (baseline in 20s). Her cellular rejection was treated with solu-medrol 500 mg IV x 1. Per notes, HTS staff and pathology staff reviewed the pathology, and there was no evidence of cellular necrosis, so she was sent home on a steroid pulse of Prednisone 30 mg bid X 2 days followed by 5 mg qd with repeat EMBx 1/2017 which was ISHLT 0 pAMR 0. Allomaps have been 30 & 33 since that time, and today's allomap is pending. Decision was made after rejection episode was to continue Valcyte until March and continue Bactrim for extra 6 months (September 2017). Monthly CMVs have remained negative, today's is pending. Patient has appointment with Dr. Gopal Jacobsen today for annual C with plans to have him do endomyocardial biopsy at the same time.  Patient reports she has been doing great. Her BP at home has been well controlled with systolic in 110-120 range and diastolic in 80 range. She has been working at her job without difficulty. Her son turned 18 yesterday. Denies any chest pain, syncope, dizziness, SOB, diarrhea, nausea, vomiting, abdominal pain or any additional complaints at this time.     Immunosuppression regimen today: MMF 1000 mg BID, Tacro 2/2, Prednisone 1 mg.     Review of Systems   Constitution: Negative. Negative for chills, decreased appetite, fever, weakness, malaise/fatigue, weight gain and weight loss.   HENT: Negative for congestion.    Eyes: Negative.    Cardiovascular: Negative.  Negative for chest  "pain, dyspnea on exertion, leg swelling, orthopnea, palpitations, paroxysmal nocturnal dyspnea and syncope.   Respiratory: Negative.  Negative for cough, hemoptysis and shortness of breath.    Endocrine: Negative.    Hematologic/Lymphatic: Negative.    Skin: Negative.    Musculoskeletal: Negative.  Negative for arthritis, back pain, falls and gout.   Gastrointestinal: Negative.  Negative for bloating, abdominal pain, diarrhea, nausea and vomiting.   Genitourinary: Negative.    Neurological: Negative.    Psychiatric/Behavioral: Negative for depression.   Allergic/Immunologic: Negative.        Objective:     /83  Pulse 103  Ht 5' 3" (1.6 m)  Wt 55.7 kg (122 lb 12.7 oz)  LMP 03/01/2007  BMI 21.75 kg/m2    Physical Exam   Constitutional: She is oriented to person, place, and time. She appears well-developed and well-nourished.   Friend accompanying her today in exam room   HENT:   Head: Normocephalic and atraumatic.   Eyes: Conjunctivae and EOM are normal. Pupils are equal, round, and reactive to light.   Neck: Normal range of motion. Neck supple. No JVD present. No thyromegaly present.   Cardiovascular: Regular rhythm, normal heart sounds and intact distal pulses.    Tachycardic   Pulmonary/Chest: Effort normal and breath sounds normal.   Abdominal: Soft. Bowel sounds are normal.   Musculoskeletal: Normal range of motion. She exhibits no edema.   Neurological: She is alert and oriented to person, place, and time.   Skin: Skin is warm and dry.   Psychiatric: She has a normal mood and affect. Her behavior is normal. Judgment and thought content normal.   Nursing note and vitals reviewed.        Chemistry        Component Value Date/Time     03/22/2017 0845    K 3.8 03/22/2017 0845     03/22/2017 0845    CO2 24 03/22/2017 0845    BUN 26 (H) 03/22/2017 0845    CREATININE 0.9 03/22/2017 0845    GLU 87 03/22/2017 0845        Component Value Date/Time    CALCIUM 9.4 03/22/2017 0845    ALKPHOS 87 " 03/22/2017 0845    AST 39 03/22/2017 0845    ALT 23 03/22/2017 0845    BILITOT 0.7 03/22/2017 0845            Magnesium   Date Value Ref Range Status   03/22/2017 2.0 1.6 - 2.6 mg/dL Final       Lab Results   Component Value Date    WBC 3.22 (L) 03/22/2017    HGB 12.0 03/22/2017    HCT 35.7 (L) 03/22/2017    MCV 92 03/22/2017     03/22/2017       Lab Results   Component Value Date    INR 1.0 10/27/2016    INR 1.0 10/26/2016    INR 1.0 10/25/2016       BNP   Date Value Ref Range Status   03/22/2017 76 0 - 99 pg/mL Final     Comment:     Values of less than 100 pg/ml are consistent with non-CHF populations.   02/22/2017 58 0 - 99 pg/mL Final     Comment:     Values of less than 100 pg/ml are consistent with non-CHF populations.   01/05/2017 75 0 - 99 pg/mL Final     Comment:     Values of less than 100 pg/ml are consistent with non-CHF populations.       No results found for: LDH    Tacrolimus Lvl   Date Value Ref Range Status   03/22/2017 8.6 5.0 - 15.0 ng/mL Final     Comment:     Testing performed by Liquid Chromatography-Tandem  Mass Spectrometry (LC-MS/MS).  This test was developed and its performance characteristics  determined by [facility], Department of Pathology  and Laboratory Medicine in a manner consistent with CLIA  requirements. It has not been cleared or approved by the [place]  Food and Drug Administration.  This test is used for clinical   purposes.  It should not be regarded as investigational or for  research.       No results found for: SIROLIMUS  No results found for: CYCLOSPORINE    Assessment:     1. Heart transplanted    2. Essential hypertension    3. Hyperlipidemia, unspecified hyperlipidemia type    4. Hypothyroidism, unspecified type    5. Other cytomegaloviral diseases    6. Immunosuppression    7. Hypothyroidism (acquired)    8. Long-term use of immunosuppressant medication    9. Leukopenia, unspecified type    10. Adverse effect of glucocorticoid or synthetic analogue,  subsequent encounter        Plan:   Annual evaluation s/p OHTx  -2D echo today reviewed, normal graft function  -Continue prograf 2/2, MMF 1000 mg BID (not on full dose given CMV infection, leukopenia)  -Allomap pending. Plan for LHC + biopsy next week (at same time). If biopsy negative, d/c prednisone 1 mg    H/o CMV Infection  -Pending CMV PCR being negative, will plan to d/c valcyte    Leukopenia  -WBC slightly lower today, but stable  -Hope that with d/c of valcyte if CMV negative, WBC will improve  -If WBC improves, hope to be able to go to full dose MMF in near future    HTN  -Continue amlodipine for HTN, refilled    Hypothyroidism  -Will add on TSH & T4 to monitor synthroid dosing    Hyperlipidemia  -Refilled lipitor, bactrim (to be continued until September)    Return instructions as set forth by post transplant schedule or as needed:    Clinic: Return for labs and/or biopsy weekly the first month, every two weeks during month 2 and then monthly for the first year at the provider or coordinator's discretion. During the second year, return to clinic every 3 months. Post transplant year 3-5 return every 6 months. There will be a comprehensive post transplant evaluation every year that may include LHC/RHC/biopsy, stress test, echo, CXR, and other health screening exams.    In addition to the clinical assessment, I have ordered Allomap testing for this patient to assist in identification of moderate/severe acute cellular rejection (ACR) in a pt with stable Allograft function instead of endomyocardial biopsy.     Patient is reminded to call with any health changes as these can be early signs of transplant complications. Patient is advised to make sure any new medications or changes of old medications are discussed with a pharmacist or physician knowledgeable with transplant to avoid rejection/drug toxicity related to significant drug interactions.    UNOS Patient Status  Functional Status: 100% - Normal, no  complaints, no evidence of disease  Physical Capacity: No Limitations  Working for Income: No  If no, reason not working: Unknown

## 2017-03-23 ENCOUNTER — TELEPHONE (OUTPATIENT)
Dept: TRANSPLANT | Facility: CLINIC | Age: 62
End: 2017-03-23

## 2017-03-23 NOTE — TELEPHONE ENCOUNTER
Called pt to inform he rthat her chart was reviewed in chart review. Per chart review pt is to d/c valcyte and prednisone and we will check CMV PCR at routine follow up. Also. EGBX 3-4 after d/c prednisone . No answer. LM on  with update. Will also send [facility] message with the following.          Patient seen in clinic this week, due to come off valcyte this week.      Will do the following:     Discontinue valcyte. Check PCR following discontinuation of valcyte.  Discontinue prednisone.   Biopsy 3-4 weeks after stopping prednisone.

## 2017-03-31 ENCOUNTER — PATIENT MESSAGE (OUTPATIENT)
Dept: TRANSPLANT | Facility: CLINIC | Age: 62
End: 2017-03-31

## 2017-04-04 ENCOUNTER — PATIENT MESSAGE (OUTPATIENT)
Dept: TRANSPLANT | Facility: CLINIC | Age: 62
End: 2017-04-04

## 2017-04-12 ENCOUNTER — PATIENT MESSAGE (OUTPATIENT)
Dept: TRANSPLANT | Facility: CLINIC | Age: 62
End: 2017-04-12

## 2017-04-12 DIAGNOSIS — Z94.1 STATUS POST HEART TRANSPLANT: Primary | ICD-10-CM

## 2017-04-12 DIAGNOSIS — E03.9 HYPOTHYROIDISM, UNSPECIFIED TYPE: ICD-10-CM

## 2017-04-12 RX ORDER — LEVOTHYROXINE SODIUM 75 UG/1
75 TABLET ORAL
Qty: 20 TABLET | Refills: 11 | Status: SHIPPED | OUTPATIENT
Start: 2017-04-12 | End: 2018-06-22 | Stop reason: SDUPTHER

## 2017-04-21 RX ORDER — TACROLIMUS 1 MG/1
2 CAPSULE ORAL EVERY 12 HOURS
Qty: 240 CAPSULE | Refills: 11 | Status: SHIPPED | OUTPATIENT
Start: 2017-04-21 | End: 2017-07-18 | Stop reason: SDUPTHER

## 2017-04-28 ENCOUNTER — HOSPITAL ENCOUNTER (OUTPATIENT)
Facility: HOSPITAL | Age: 62
Discharge: HOME | End: 2017-04-28
Attending: INTERNAL MEDICINE | Admitting: INTERNAL MEDICINE
Payer: MEDICAID

## 2017-04-28 VITALS
HEIGHT: 63 IN | SYSTOLIC BLOOD PRESSURE: 131 MMHG | BODY MASS INDEX: 21.44 KG/M2 | WEIGHT: 121 LBS | RESPIRATION RATE: 20 BRPM | OXYGEN SATURATION: 100 % | TEMPERATURE: 97 F | HEART RATE: 81 BPM | DIASTOLIC BLOOD PRESSURE: 76 MMHG

## 2017-04-28 DIAGNOSIS — R91.8 OTHER NONSPECIFIC ABNORMAL FINDING OF LUNG FIELD: ICD-10-CM

## 2017-04-28 DIAGNOSIS — Z94.1 STATUS POST HEART TRANSPLANT: Primary | ICD-10-CM

## 2017-04-28 PROBLEM — T86.290 CARDIAC ALLOGRAFT VASCULOPATHY: Status: ACTIVE | Noted: 2017-04-28

## 2017-04-28 LAB
ABO + RH BLD: NORMAL
ANION GAP SERPL CALC-SCNC: 10 MMOL/L
BASOPHILS # BLD AUTO: 0.04 K/UL
BASOPHILS NFR BLD: 1.1 %
BLD GP AB SCN CELLS X3 SERPL QL: NORMAL
BUN SERPL-MCNC: 18 MG/DL
CALCIUM SERPL-MCNC: 9.5 MG/DL
CHLORIDE SERPL-SCNC: 105 MMOL/L
CO2 SERPL-SCNC: 26 MMOL/L
CREAT SERPL-MCNC: 1 MG/DL
DIFFERENTIAL METHOD: ABNORMAL
EOSINOPHIL # BLD AUTO: 0.2 K/UL
EOSINOPHIL NFR BLD: 4 %
ERYTHROCYTE [DISTWIDTH] IN BLOOD BY AUTOMATED COUNT: 13.7 %
EST. GFR  (AFRICAN AMERICAN): >60 ML/MIN/1.73 M^2
EST. GFR  (NON AFRICAN AMERICAN): >60 ML/MIN/1.73 M^2
GLUCOSE SERPL-MCNC: 84 MG/DL
HCT VFR BLD AUTO: 36.2 %
HGB BLD-MCNC: 11.5 G/DL
LYMPHOCYTES # BLD AUTO: 1.2 K/UL
LYMPHOCYTES NFR BLD: 31.9 %
MCH RBC QN AUTO: 29.7 PG
MCHC RBC AUTO-ENTMCNC: 31.8 %
MCV RBC AUTO: 94 FL
MONOCYTES # BLD AUTO: 0.6 K/UL
MONOCYTES NFR BLD: 17.2 %
NEUTROPHILS # BLD AUTO: 1.7 K/UL
NEUTROPHILS NFR BLD: 45.5 %
PLATELET # BLD AUTO: 196 K/UL
PMV BLD AUTO: 10.3 FL
POTASSIUM SERPL-SCNC: 3.9 MMOL/L
RBC # BLD AUTO: 3.87 M/UL
SODIUM SERPL-SCNC: 141 MMOL/L
TSH SERPL DL<=0.005 MIU/L-ACNC: 1.77 UIU/ML
WBC # BLD AUTO: 3.73 K/UL

## 2017-04-28 PROCEDURE — 80048 BASIC METABOLIC PNL TOTAL CA: CPT

## 2017-04-28 PROCEDURE — 93505 ENDOMYOCARDIAL BIOPSY: CPT | Mod: 26,,, | Performed by: INTERNAL MEDICINE

## 2017-04-28 PROCEDURE — 25000003 PHARM REV CODE 250: Performed by: INTERNAL MEDICINE

## 2017-04-28 PROCEDURE — 63600175 PHARM REV CODE 636 W HCPCS

## 2017-04-28 PROCEDURE — 99152 MOD SED SAME PHYS/QHP 5/>YRS: CPT | Mod: ,,, | Performed by: INTERNAL MEDICINE

## 2017-04-28 PROCEDURE — 88307 TISSUE EXAM BY PATHOLOGIST: CPT | Performed by: PATHOLOGY

## 2017-04-28 PROCEDURE — 88342 IMHCHEM/IMCYTCHM 1ST ANTB: CPT | Mod: 26,,, | Performed by: PATHOLOGY

## 2017-04-28 PROCEDURE — 93010 ELECTROCARDIOGRAM REPORT: CPT | Mod: ,,, | Performed by: INTERNAL MEDICINE

## 2017-04-28 PROCEDURE — 92978 ENDOLUMINL IVUS OCT C 1ST: CPT | Mod: 26,,, | Performed by: INTERNAL MEDICINE

## 2017-04-28 PROCEDURE — 93460 R&L HRT ART/VENTRICLE ANGIO: CPT | Mod: 26,,, | Performed by: INTERNAL MEDICINE

## 2017-04-28 PROCEDURE — 92978 ENDOLUMINL IVUS OCT C 1ST: CPT

## 2017-04-28 PROCEDURE — 93005 ELECTROCARDIOGRAM TRACING: CPT | Mod: 59

## 2017-04-28 PROCEDURE — 85025 COMPLETE CBC W/AUTO DIFF WBC: CPT

## 2017-04-28 PROCEDURE — 25000003 PHARM REV CODE 250

## 2017-04-28 PROCEDURE — 86901 BLOOD TYPING SEROLOGIC RH(D): CPT

## 2017-04-28 PROCEDURE — 84443 ASSAY THYROID STIM HORMONE: CPT

## 2017-04-28 PROCEDURE — 88307 TISSUE EXAM BY PATHOLOGIST: CPT | Mod: 26,,, | Performed by: PATHOLOGY

## 2017-04-28 PROCEDURE — 99152 MOD SED SAME PHYS/QHP 5/>YRS: CPT

## 2017-04-28 PROCEDURE — 86900 BLOOD TYPING SEROLOGIC ABO: CPT

## 2017-04-28 PROCEDURE — 93460 R&L HRT ART/VENTRICLE ANGIO: CPT

## 2017-04-28 RX ORDER — DIPHENHYDRAMINE HCL 50 MG
50 CAPSULE ORAL ONCE
Status: COMPLETED | OUTPATIENT
Start: 2017-04-28 | End: 2017-04-28

## 2017-04-28 RX ORDER — SODIUM CHLORIDE 9 MG/ML
3 INJECTION, SOLUTION INTRAVENOUS CONTINUOUS
Status: ACTIVE | OUTPATIENT
Start: 2017-04-28 | End: 2017-04-28

## 2017-04-28 RX ORDER — SODIUM CHLORIDE 9 MG/ML
5 INJECTION, SOLUTION INTRAVENOUS CONTINUOUS
Status: ACTIVE | OUTPATIENT
Start: 2017-04-28 | End: 2017-04-28

## 2017-04-28 RX ADMIN — DIPHENHYDRAMINE HYDROCHLORIDE 50 MG: 50 CAPSULE ORAL at 06:04

## 2017-04-28 RX ADMIN — SODIUM CHLORIDE 3 ML/KG/HR: 0.9 INJECTION, SOLUTION INTRAVENOUS at 06:04

## 2017-04-28 NOTE — PLAN OF CARE
Problem: Patient Care Overview  Goal: Plan of Care Review  Outcome: Ongoing (interventions implemented as appropriate)  Received report from SUSSY Parekh. Patient s/p LHC/RHC, AAOx3. VSS, no c/o pain or discomfort at this time, resp even and unlabored. Gauze/tegaderm dressing to R groin is CDI. No active bleeding. No hematoma noted. Post procedure protocol reviewed with patient. Understanding verbalized. Family members called to bedside. Nurse call bell within reach. Will continue to monitor per post procedure protocol.

## 2017-04-28 NOTE — IP AVS SNAPSHOT
[facility]  [address]  [facility] [address]  Phone: [phone]           Patient Discharge Instructions   Our goal is to set you up for success. This packet includes information on your condition, medications, and your home care.  It will help you care for yourself to prevent having to return to the hospital.     Please ask your nurse if you have any questions.      There are many details to remember when preparing to leave the hospital. Here is what you will need to do:    1. Take your medicine. If you are prescribed medications, review your Medication List on the following pages. You may have new medications to  at the pharmacy and others that you'll need to stop taking. Review the instructions for how and when to take your medications. Talk with your doctor or nurses if you are unsure of what to do.     2. Go to your follow-up appointments. Specific follow-up information is listed in the following pages. Your may be contacted by a nurse or clinical provider about future appointments. Be sure we have all of the phone numbers to reach you. Please contact your provider's office if you are unable to make an appointment.     3. Watch for warning signs. Your doctor or nurse will give you detailed warning signs to watch for and when to call for assistance. These instructions may also include educational information about your condition. If you experience any of warning signs to your health, call your doctor.           [facility]  Unless otherwise directed by your provider, please   contact [facility], our nurse care line   that is available for 24/7 assistance.     [phone] (toll-free)     Registered nurses in the [facility]   provide: appointment scheduling, clinical advisement, health education, and other advisory services.                  ** Verify the list of medication(s) below is accurate and up to date. Carry this with you in case of  emergency. If your medications have changed, please notify your healthcare provider.             Medication List      CONTINUE taking these medications        Additional Info                      amlodipine 5 MG tablet   Commonly known as:  NORVASC   Quantity:  30 tablet   Refills:  11   Dose:  5 mg    Instructions:  Take 1 tablet (5 mg total) by mouth once daily.     Begin Date    AM    Noon    PM    Bedtime       aspirin 81 MG EC tablet   Commonly known as:  ECOTRIN   Quantity:  30 tablet   Refills:  11   Dose:  81 mg    Instructions:  Take 1 tablet (81 mg total) by mouth once daily.     Begin Date    AM    Noon    PM    Bedtime       atorvastatin 40 MG tablet   Commonly known as:  LIPITOR   Quantity:  30 tablet   Refills:  11   Dose:  40 mg    Instructions:  Take 1 tablet (40 mg total) by mouth once daily.     Begin Date    AM    Noon    PM    Bedtime       levothyroxine 75 MCG tablet   Commonly known as:  SYNTHROID   Quantity:  20 tablet   Refills:  11   Dose:  75 mcg    Instructions:  Take 1 tablet (75 mcg total) by mouth before breakfast.     Begin Date    AM    Noon    PM    Bedtime       magnesium oxide 400 mg tablet   Commonly known as:  MAG-OX   Quantity:  60 tablet   Refills:  11   Dose:  400 mg    Instructions:  Take 1 tablet (400 mg total) by mouth 2 (two) times daily.     Begin Date    AM    Noon    PM    Bedtime       multivitamin tablet   Commonly known as:  THERAGRAN   Refills:  0   Dose:  1 tablet    Instructions:  Take 1 tablet by mouth once daily.     Begin Date    AM    Noon    PM    Bedtime       mycophenolate 250 mg Cap   Commonly known as:  CELLCEPT   Quantity:  240 capsule   Refills:  11   Dose:  1000 mg    Instructions:  Take 4 capsules (1,000 mg total) by mouth 2 (two) times daily.     Begin Date    AM    Noon    PM    Bedtime       sulfamethoxazole-trimethoprim 400-80mg 400-80 mg per tablet   Commonly known as:  BACTRIM,SEPTRA   Quantity:  30 tablet   Refills:  11   Dose:  1 tablet     "Instructions:  Take 1 tablet by mouth once daily. Z94.1 heart replaced by transplant     Begin Date    AM    Noon    PM    Bedtime       tacrolimus 1 MG Cap   Commonly known as:  PROGRAF   Quantity:  240 capsule   Refills:  11   Dose:  2 mg    Instructions:  Take 2 capsules (2 mg total) by mouth every 12 (twelve) hours.     Begin Date    AM    Noon    PM    Bedtime                  Please bring to all follow up appointments:    1. A copy of your discharge instructions.  2. All medicines you are currently taking in their original bottles.  3. Identification and insurance card.    Please arrive 15 minutes ahead of scheduled appointment time.    Please call 24 hours in advance if you must reschedule your appointment and/or time.        Follow-up Information     Follow up with Alyssa Negron MD In 2 weeks.    Specialties:  Cardiology, Transplant    [facility] information:    [facility] [address]  [phone]            Discharge Instructions       Remove dressing after 24 hours.  Showers are ok. No soaking site in water.  No lifting >10lb for 1 week.  Avoid straining and climbing stairs if possible.  Monitor site daily for drainage, redness. Call MD if you start to run fever.  If bleeding occurs, Lie flat and hold pressure for 15 minutes. If still bleeding, call 911.  Monitor for any swelling or pain in the area.      Primary Diagnosis     Your primary diagnosis was:  Status Post Heart Transplant      Admission Information     Date & Time Provider Department [facility]    4/28/2017  5:49 AM Baldomero Jacobsen MD [facility] [id number]      Care Providers     Provider Role Specialty Primary office phone    Baldomero Jacobsen MD Attending Provider Cardiology [phone]      Your Vitals Were     BP Pulse Temp Resp Height Weight    121/68 87 96.8 °F (36 °C) (Oral) 20 5' 3" (1.6 m) 54.9 kg (121 lb)    Last Period SpO2 BMI          03/01/2007 100% 21.43 kg/m2        Recent Lab Values        " 7/2/2015 3/23/2016                       12:23 PM  5:33 AM          A1C 5.6 5.4                     Pending Labs     Order Current Status    Specimen to Pathology - Surgery In process      Allergies as of 4/28/2017     No Known Allergies      Advance Directives     An advance directive is a document which, in the event you are no longer able to make decisions for yourself, tells your healthcare team what kind of treatment you do or do not want to receive, or who you would like to make those decisions for you.  If you do not currently have an advance directive, [facility] encourages you to create one.  For more information call:  [phone]), [phone] ([phone]),  or log on to [email or web address].        Language Assistance Services     ATTENTION: Language assistance services are available, free of charge. Please call [phone].      ATENCIÓN: Si habla español, tiene a oakley disposición servicios gratuitos de asistencia lingüística. Llame al [phone].     [facility] Ý: N?u b?n nói Ti?ng Vi?t, có các d?ch v? h? tr? ngôn ng? mi?n phí dành cho b?n. G?i s? [phone].         [facility] complies with applicable Federal civil rights laws and does not discriminate on the basis of race, color, national origin, age, disability, or sex.

## 2017-04-28 NOTE — PROGRESS NOTES
"POST CATH NOTE    Procedure:  LHC with IVUS, RHC with EMBx  Referring MD:  Alyssa Negron, HTS  Indication:  Surveillance LHC/RHC with EMBx s/p OHT   Access:  R CFA, R CFV    Findings:  Normal coronaries by angiography, but IVUS revealed intimal hyperplasia/CAV within prox/mid LAD (see procedure report for details)  LVEDP:  6 mmHg  RAP 3, RV 23/4, PAP 24/10, PCWP 8  EMBx x5 obtained. RAP waveform after EMBx was normal    Hemostasis achieved with manual ressure. Patient tolerated the procedure well, no complications    Post Cath Exam:  /82 (BP Location: Left arm, Patient Position: Lying, BP Method: Automatic)  Pulse 87  Temp 98.2 °F (36.8 °C) (Oral)   Resp 18  Ht 5' 3" (1.6 m)  Wt 54.9 kg (121 lb)  LMP 03/01/2007  SpO2 100%  Breastfeeding? No  BMI 21.43 kg/m2  No unusual pain, hematoma or thrill at vascular access site.  Distal pulse present without signs of ischemia.    Recommendation:  -routine post-cath care and IV hydration  -anticipate d/c home later today  -pt to follow up with HTS for continued management  "

## 2017-04-28 NOTE — PROGRESS NOTES
Patient discharged per MD orders. Instructions given on medications, wound care, activity, signs of infection, when to call MD, and follow up appointments. Pt verbalized understanding. Telemetry and PIV removed. Patient and family refused transport, ambulated off unit.

## 2017-04-28 NOTE — DISCHARGE SUMMARY
[facility]  Discharge Summary      Admit Date: 4/28/2017    Discharge Date and Time:  04/28/2017 4:27 PM    Attending Physician: Baldomero Jacobsen MD     Reason for Admission: Outpatient procedure    Procedures Performed: Procedure(s) (LRB):  HEART CATH-LEFT (Left)  HEART CATH-RIGHT (Right)    Hospital Course (synopsis of major diagnoses, care, treatment, and services provided during the course of the hospital stay): 62 y.o. 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 Procedure was uncomplicated. Intracardiac pressures were normal. Coronaries were normal by angiography, but IVUS demonstrated intimal hyperplasia/CAV within the LAD (see procedure report for details). Pt to f/u with Dr. Negron with [facility].    Consults: none    Significant Diagnostic Studies:  R/LHC:  Hemodynamic Results  LVEDP (Pre): 7 mmHg  AOP: 106/69 (86)  PA: 24/10 (16)  PW:  (7)  RV: 24  RA:  (3)  CONDITION 1:  FICKCI: 3.8700  FICKCO: 6.0300  Angiographic Results  Diagnostic:        Patient has co-dominant coronary arteries.     - Left Main Coronary Artery:             IVUS showed the LM is normal has a cross sectional area of 13.2 sq mm. There is RODRÍGUEZ 3 flow. FELIPE=1.2mm.     - Left Anterior Descending Artery:             IVUS showed the proximal LAD is normal has a cross sectional area of 6.3 sq mm. There is RODRÍGUEZ 3 flow. FELIPE=1.6mm.             IVUS showed the mid LAD is normal has a cross sectional area of 5.5 sq mm. There is RODRÍGUEZ 3 flow. FELIPE=1.0mm.             IVUS showed the distal LAD is normal has a cross sectional area of 3.9 sq mm. There is RORDÍGUEZ 3 flow. FELIPE=0.7mm.     - D1:             The D1 is normal. There is RDORÍGUEZ 3 flow.     - Left Circumflex Artery:             The LCX is normal. There is RODRÍGUEZ 3 flow.     - OM1:             The OM1 is normal. There is RODRÍGUEZ 3 flow.     - OM2:             The OM2 is normal. There is RODRÍGUEZ 3 flow.     - OM3:              The OM3 is normal. There is RODRÍGUEZ 3 flow.     - OM4:             The OM4 is normal. There is RODRÍGUEZ 3 flow.     - Right Coronary Artery:             The RCA is normal. There is RODRÍGUEZ 3 flow.     - Posterior Lateral Branch:             The PLB is normal. There is RODRÍGUEZ 3 flow.     - Posterior Descending Artery:             The PDA is normal. There is RODRÍGUEZ 3 flow.  Summary/Post-Operative Diagnosis    RHC data below.    Normal coronary arteries by angiography.    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       Discharged Condition: good    Disposition: Home or Self Care    Follow Up/Patient Instructions:     Medications:  Reconciled Home Medications:   Current Discharge Medication List      CONTINUE these medications which have NOT CHANGED    Details   amlodipine (NORVASC) 5 MG tablet Take 1 tablet (5 mg total) by mouth once daily.  Qty: 30 tablet, Refills: 11    Associated Diagnoses: Essential hypertension      aspirin (ECOTRIN) 81 MG EC tablet Take 1 tablet (81 mg total) by mouth once daily.  Qty: 30 tablet, Refills: 11      atorvastatin (LIPITOR) 40 MG tablet Take 1 tablet (40 mg total) by mouth once daily.  Qty: 30 tablet, Refills: 11    Associated Diagnoses: Hyperlipidemia, unspecified hyperlipidemia type      levothyroxine (SYNTHROID) 75 MCG tablet Take 1 tablet (75 mcg total) by mouth before breakfast.  Qty: 20 tablet, Refills: 11    Associated Diagnoses: Status post heart transplant; Hypothyroidism, unspecified type      magnesium oxide (MAG-OX) 400 mg tablet Take 1 tablet (400 mg total) by mouth 2 (two) times daily.  Qty: 60 tablet, Refills: 11       multivitamin (THERAGRAN) tablet Take 1 tablet by mouth once daily.      mycophenolate (CELLCEPT) 250 mg Cap Take 4 capsules (1,000 mg total) by mouth 2 (two) times daily.  Qty: 240 capsule, Refills: 11      sulfamethoxazole-trimethoprim 400-80mg (BACTRIM,SEPTRA) 400-80 mg per tablet Take 1 tablet by mouth once daily. Z94.1 heart replaced by transplant  Qty: 30 tablet, Refills: 11    Associated Diagnoses: Heart transplanted      tacrolimus (PROGRAF) 1 MG Cap Take 2 capsules (2 mg total) by mouth every 12 (twelve) hours.  Qty: 240 capsule, Refills: 11           No discharge procedures on file.  Follow-up Information     Follow up with Alyssa Negron MD In 2 weeks.    Specialties:  Cardiology, Transplant    Contact information:    [facility]  [facility] [id number] [phone]

## 2017-04-28 NOTE — PROGRESS NOTES
Patient ambulated in fowler with RN. No complaints from patient. Dressing to R groin cdi, soft. Will monitor.

## 2017-05-01 LAB
POC ACTIVATED CLOTTING TIME K: 147 SEC (ref 74–137)
SAMPLE: ABNORMAL

## 2017-05-02 ENCOUNTER — TELEPHONE (OUTPATIENT)
Dept: TRANSPLANT | Facility: CLINIC | Age: 62
End: 2017-05-02

## 2017-05-02 ENCOUNTER — PATIENT MESSAGE (OUTPATIENT)
Dept: TRANSPLANT | Facility: CLINIC | Age: 62
End: 2017-05-02

## 2017-05-02 NOTE — TELEPHONE ENCOUNTER
Called pt regarding update from chart review. No answer. LM on  for pt to call back when available.

## 2017-05-03 ENCOUNTER — TELEPHONE (OUTPATIENT)
Dept: TRANSPLANT | Facility: CLINIC | Age: 62
End: 2017-05-03

## 2017-05-03 ENCOUNTER — PATIENT MESSAGE (OUTPATIENT)
Dept: TRANSPLANT | Facility: CLINIC | Age: 62
End: 2017-05-03

## 2017-05-03 NOTE — TELEPHONE ENCOUNTER
Spoke with pt regarding update from chart review. Pt requested to come after her son's graduation on the 12 th of may. Verbalized understanding and informed pt that I will try to get an appt for that following week. Pt reports she will call back with a date and time.

## 2017-05-04 ENCOUNTER — PATIENT MESSAGE (OUTPATIENT)
Dept: TRANSPLANT | Facility: CLINIC | Age: 62
End: 2017-05-04

## 2017-05-05 ENCOUNTER — PATIENT MESSAGE (OUTPATIENT)
Dept: TRANSPLANT | Facility: CLINIC | Age: 62
End: 2017-05-05

## 2017-05-08 ENCOUNTER — PATIENT MESSAGE (OUTPATIENT)
Dept: TRANSPLANT | Facility: CLINIC | Age: 62
End: 2017-05-08

## 2017-05-10 ENCOUNTER — PATIENT MESSAGE (OUTPATIENT)
Dept: TRANSPLANT | Facility: CLINIC | Age: 62
End: 2017-05-10

## 2017-05-12 ENCOUNTER — PATIENT MESSAGE (OUTPATIENT)
Dept: TRANSPLANT | Facility: CLINIC | Age: 62
End: 2017-05-12

## 2017-05-15 ENCOUNTER — LAB VISIT (OUTPATIENT)
Dept: LAB | Facility: HOSPITAL | Age: 62
End: 2017-05-15
Attending: INTERNAL MEDICINE
Payer: COMMERCIAL

## 2017-05-15 ENCOUNTER — OFFICE VISIT (OUTPATIENT)
Dept: TRANSPLANT | Facility: CLINIC | Age: 62
End: 2017-05-15
Payer: COMMERCIAL

## 2017-05-15 DIAGNOSIS — Z94.1 STATUS POST HEART TRANSPLANT: ICD-10-CM

## 2017-05-15 DIAGNOSIS — I10 ESSENTIAL HYPERTENSION: ICD-10-CM

## 2017-05-15 DIAGNOSIS — T86.20 COMPLICATION OF HEART TRANSPLANT, UNSPECIFIED COMPLICATION: ICD-10-CM

## 2017-05-15 DIAGNOSIS — D72.819 LEUKOPENIA, UNSPECIFIED TYPE: ICD-10-CM

## 2017-05-15 DIAGNOSIS — B25.8 OTHER CYTOMEGALOVIRAL DISEASES: ICD-10-CM

## 2017-05-15 DIAGNOSIS — T86.290 CARDIAC ALLOGRAFT VASCULOPATHY: ICD-10-CM

## 2017-05-15 DIAGNOSIS — Z79.52 LONG TERM CURRENT USE OF SYSTEMIC STEROIDS: ICD-10-CM

## 2017-05-15 DIAGNOSIS — Z94.1 STATUS POST HEART TRANSPLANT: Primary | ICD-10-CM

## 2017-05-15 LAB
CREAT UR-MCNC: 15 MG/DL
PROT UR-MCNC: <7 MG/DL
PROT/CREAT RATIO, UR: NORMAL

## 2017-05-15 PROCEDURE — 84156 ASSAY OF PROTEIN URINE: CPT

## 2017-05-15 PROCEDURE — 99999 PR PBB SHADOW E&M-EST. PATIENT-LVL I: CPT | Mod: PBBFAC,,, | Performed by: INTERNAL MEDICINE

## 2017-05-15 PROCEDURE — 99211 OFF/OP EST MAY X REQ PHY/QHP: CPT | Mod: PBBFAC | Performed by: INTERNAL MEDICINE

## 2017-05-15 PROCEDURE — 99214 OFFICE O/P EST MOD 30 MIN: CPT | Mod: S$GLB,,, | Performed by: INTERNAL MEDICINE

## 2017-05-15 RX ORDER — SIROLIMUS 1 MG/1
1 TABLET, FILM COATED ORAL ONCE
Qty: 30 TABLET | Refills: 0 | Status: SHIPPED | OUTPATIENT
Start: 2017-05-15 | End: 2017-06-01 | Stop reason: SDUPTHER

## 2017-05-15 NOTE — LETTER
May 15, 2017        Esha Tee  [address]  [place] [address]  Phone: [phone]  Fax: [phone]             [facility] [address] [place] [address]  Phone: [phone]   Patient: Cassandra Gonzales   MR Number: [id number]   YOB: 1955   Date of Visit: 5/15/2017       Dear Dr. Esha Tee    Thank you for referring Cassandra Gonzales to me for evaluation. Attached you will find relevant portions of my assessment and plan of care.    If you have questions, please do not hesitate to call me. I look forward to following Cassandra Gonzales along with you.    Sincerely,    Darren Brunson MD    Enclosure    If you would like to receive this communication electronically, please contact [email or web address] or [phone] to request [employer] Link access.    [employer] Link is a tool which provides read-only access to select patient information with whom you have a relationship. Its easy to use and provides real time access to review your patients record including encounter summaries, notes, results, and demographic information.    If you feel you have received this communication in error or would no longer like to receive these types of communications, please e-mail [email or web address]

## 2017-05-15 NOTE — PROGRESS NOTES
Subjective:   Ms. Gonzales is a 62 y.o. year old White female who received a  - brain death heart transplant on 3/25/16.      CMV status:   Donor: +  Recipient: -    HPI  Ms. Gonzales is a very pleasant 60 yo WF s/p OHTx 3/25/16 for peripartum CMP, CMV mismatch, s/p recent CMV infection, remains on Valcyte for this (renally dosed at 450 mg qd), HTN, hypothyroidism and HLP is here to f/u recent overnight admit for moderate cellular rejection (ISHLT 2) per path report in the setting of elevated Allomap of 34 (baseline was in the 20's). She was asymptomatic and graft function was normal. She was given IV Solu Medrol 500 mg X 1. HTS staff and pathology staff reviewed the pathology, and there was no evidence of cellular necrosis, so she was sent home on a steroid pulse of Prednisone 30 mg bid X 2 days followed by 5 mg qd with EMBx done today likely to be followed by a slow taper. Had her LHC with IVUS and showed CAV by IVUS (mew when compared to previous angiogram). She is here today to discuss initiation of Rapamune. Clinically continue to do very well. Has no complaints. LV function is preserved. Immuno regimen includes; Prograf 2/2, Cellcept 1000 mg BID. Off Prednisone.  Labs today are still pending.     Review of Systems   Constitution: Negative. Negative for chills, decreased appetite, diaphoresis, fever, weakness, malaise/fatigue, night sweats, weight gain and weight loss.   Eyes: Negative.    Cardiovascular: Negative for chest pain, claudication, cyanosis, dyspnea on exertion, irregular heartbeat, leg swelling, near-syncope, orthopnea, palpitations, paroxysmal nocturnal dyspnea and syncope.   Respiratory: Negative for cough, hemoptysis and shortness of breath.    Endocrine: Negative.    Hematologic/Lymphatic: Negative.    Skin: Negative for color change, dry skin and nail changes.   Musculoskeletal: Negative.    Gastrointestinal: Negative.    Genitourinary: Negative.        Objective:   Last menstrual period  03/01/2007.body mass index is unknown because there is no height or weight on file.    Physical Exam   Constitutional: She is oriented to person, place, and time. Vital signs are normal. She appears well-developed and well-nourished.   HENT:   Head: Normocephalic.   Eyes: Pupils are equal, round, and reactive to light.   Neck: Neck supple. No JVD present.   Cardiovascular: Normal rate, regular rhythm, normal heart sounds and intact distal pulses.  PMI is not displaced.  Exam reveals no gallop and no friction rub.    No murmur heard.  Pulmonary/Chest: Effort normal and breath sounds normal. No respiratory distress. She has no wheezes. She has no rales.   Abdominal: Soft. Bowel sounds are normal. She exhibits no distension. There is no tenderness. There is no rebound.   Musculoskeletal: She exhibits no edema.   Neurological: She is alert and oriented to person, place, and time.   Nursing note and vitals reviewed.      Lab Results   Component Value Date    WBC 3.73 (L) 04/28/2017    HGB 11.5 (L) 04/28/2017    HCT 36.2 (L) 04/28/2017    MCV 94 04/28/2017     04/28/2017    CO2 26 04/28/2017    CREATININE 1.0 04/28/2017    CALCIUM 9.5 04/28/2017    ALBUMIN 4.1 03/22/2017    AST 39 03/22/2017    BNP 76 03/22/2017    ALT 23 03/22/2017    ALLOMAP 33 03/22/2017       Lab Results   Component Value Date    INR 1.0 10/27/2016    INR 1.0 10/26/2016    INR 1.0 10/25/2016       BNP   Date Value Ref Range Status   03/22/2017 76 0 - 99 pg/mL Final     Comment:     Values of less than 100 pg/ml are consistent with non-CHF populations.   02/22/2017 58 0 - 99 pg/mL Final     Comment:     Values of less than 100 pg/ml are consistent with non-CHF populations.   01/05/2017 75 0 - 99 pg/mL Final     Comment:     Values of less than 100 pg/ml are consistent with non-CHF populations.       No results found for: LDH    Tacrolimus Lvl   Date Value Ref Range Status   03/22/2017 8.6 5.0 - 15.0 ng/mL Final     Comment:     Testing performed  by Liquid Chromatography-Tandem  Mass Spectrometry (LC-MS/MS).  This test was developed and its performance characteristics  determined by [facility], Department of Pathology  and Laboratory Medicine in a manner consistent with CLIA  requirements. It has not been cleared or approved by the [place]  Food and Drug Administration.  This test is used for clinical   purposes.  It should not be regarded as investigational or for  research.       No results found for: SIROLIMUS  No results found for: CYCLOSPORINE    No results found for this or any previous visit.  No results found for this or any previous visit.    Labs were reviewed with the patient.    Assessment:     1. Status post heart transplant    2. Cardiac allograft vasculopathy    3. Essential hypertension    4. Leukopenia, unspecified type    5. Other cytomegaloviral diseases        Plan:   Clinically doing well but has CAV by recent LHC with IVUS  Start rapamune with low dose Tacro  Monitor Rapa and Tacro levels.     Return instructions as set forth by post transplant schedule or as needed:    Clinic: Return for labs and/or biopsy weekly the first month, every two weeks during month 2 and then monthly for the first year at the provider or coordinator's discretion. During the second year, return to clinic every 3 months. Post transplant year 3-5 return every 6 months. There will be a comprehensive post transplant evaluation every year that may include LHC/RHC/biopsy, stress test, echo, CXR, and other health screening exams.    In addition to the clinical assessment, I have ordered Allomap testing for this patient to assist in identification of moderate/severe acute cellular rejection (ACR) in a pt with stable Allograft function instead of endomyocardial biopsy.     Patient is reminded to call with any health changes as these can be early signs of transplant complications. Patient is advised to make sure any new medications or changes of old medications  are discussed with a pharmacist or physician knowledgeable with transplant to avoid rejection/drug toxicity related to significant drug interactions.    UNOS Patient Status  Functional Status: 100% - Normal, no complaints, no evidence of disease  Physical Capacity: No Limitations  Working for Income: Unknown    Darren Brunson MD

## 2017-05-16 ENCOUNTER — TELEPHONE (OUTPATIENT)
Dept: TRANSPLANT | Facility: CLINIC | Age: 62
End: 2017-05-16

## 2017-05-16 ENCOUNTER — PATIENT MESSAGE (OUTPATIENT)
Dept: TRANSPLANT | Facility: CLINIC | Age: 62
End: 2017-05-16

## 2017-05-16 DIAGNOSIS — Z94.1 S/P ORTHOTOPIC HEART TRANSPLANT: Primary | ICD-10-CM

## 2017-05-16 RX ORDER — LISINOPRIL 5 MG/1
2.5 TABLET ORAL DAILY
Qty: 45 TABLET | Refills: 3 | Status: SHIPPED | OUTPATIENT
Start: 2017-05-16 | End: 2017-06-03 | Stop reason: SDUPTHER

## 2017-05-16 NOTE — TELEPHONE ENCOUNTER
Labs and imaging reviewed. She has significant CAV. Will d/c amlodipine and start 2.5mg Lisinopril daily. And plan to start Rapamune as previously noted.    ANUM Mccoy MD  [facility] Cardiology Fellow

## 2017-05-16 NOTE — TELEPHONE ENCOUNTER
Spoke with Rosalie Acosta the . She Updated pt's insurance information. Will notify pt that it is okay to call in medications that need to be refilled.

## 2017-05-16 NOTE — TELEPHONE ENCOUNTER
Called pt. No answer. LM on VM.     Informed pt that I spoke with Rosalie, the  and updated her [employer] in [employer] and will contact the pharmacy regarding her pharmacy coverage.     Also informed pt of the plan of her medication regimen change and informed her that I will also send it in EPIC- [facility] messager.        Informed pt per protocol once she receives her medications she will start on Monday 5/22/17 and will get weekly labs on Mondays locally:    Rapamune 3mg on the first day, then 1mg once daily after.    Tacro ( prograf ) decrease to 1 mg in the AM and 1 mg in the PM     Cellcept 1000 mg twice daily     Lisinopril 2.5 mg daily     Discontinue Norvasc.     Bactrim - once daily     Atorvastatin( lipitor) 40 mg daily   Synthroid  75 mcg daily   Discontinue Magnesium, per visit with Dr. Brunson     Instructed pt to call back if she has any questions and informed her I will also send a [facility] message, as informed she works during the daytime.     Will send new lab order slip to local lab.

## 2017-05-17 ENCOUNTER — DOCUMENTATION ONLY (OUTPATIENT)
Dept: TRANSPLANT | Facility: CLINIC | Age: 62
End: 2017-05-17

## 2017-05-18 ENCOUNTER — PATIENT MESSAGE (OUTPATIENT)
Dept: TRANSPLANT | Facility: CLINIC | Age: 62
End: 2017-05-18

## 2017-05-29 ENCOUNTER — TELEPHONE (OUTPATIENT)
Dept: TRANSPLANT | Facility: CLINIC | Age: 62
End: 2017-05-29

## 2017-05-30 ENCOUNTER — TELEPHONE (OUTPATIENT)
Dept: TRANSPLANT | Facility: CLINIC | Age: 62
End: 2017-05-30

## 2017-05-30 LAB
EXT ALBUMIN: 3.7
EXT ALT: 45
EXT AST: 52
EXT BUN: 23
EXT CALCIUM: 9
EXT CHLORIDE: 108
EXT CREATININE: 0.8 MG/DL
EXT GLUCOSE: 90
EXT HEMATOCRIT: 38.7
EXT HEMOGLOBIN: 12.3
EXT MAGNESIUM: 1.7
EXT PLATELETS: 95
EXT POTASSIUM: 4.9
EXT PROTEIN TOTAL: 6.7
EXT SIROLIMUS LVL: 3.5
EXT SODIUM: 139 MMOL/L
EXT TACROLIMUS LVL: 2.8
EXT WBC: 3.8

## 2017-05-31 ENCOUNTER — TELEPHONE (OUTPATIENT)
Dept: TRANSPLANT | Facility: CLINIC | Age: 62
End: 2017-05-31

## 2017-05-31 NOTE — TELEPHONE ENCOUNTER
Pt sent email to coordinator on 5/30/17, while out of the office. With attached BP. Will scan in to epic     THIS EMAIL IS FROM AN EXTERNAL SENDER! DO NOT click links or provide your User ID or Password if the sender is unknown.    Good morningStefany,  I'm forwarding my vital signs from last week. I also did lab work yesterday. Also I wanted to mention that I have to mouth sores, not too bad I'm putting over-the-counter stuff on it.  My son wanted me to ask you and the doctors if it would be OK in getting a lab dog?   We had to find a new home for her cat now Michael been asking for a dog. Need your honest truthfully opinion.  We can  talk later this afternoon.        Called and spoke with pt. 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 Pt denies any fever and stated that she has been putting some bill the counter ointments on her sores, but do not seem to be helping. Pt reports she has 2 sores in her mouth one on each side.     Informed that it was okay to get a dog as long as his/her shots are up to date and cleared by a vet. Informed pt that her BP looks great no changes for now. Informed pt that I would review labs with team and to call back if she has any questions, concerns, or if unable to get into clinic to see her PCP. Also, advised pt to call back if she has any fever, nausea, diarrhea, vomiting and or any changes in her vitals .

## 2017-06-01 RX ORDER — SIROLIMUS 1 MG/1
2 TABLET, FILM COATED ORAL DAILY
Qty: 60 TABLET | Refills: 11 | Status: SHIPPED | OUTPATIENT
Start: 2017-06-01 | End: 2017-07-08 | Stop reason: SDUPTHER

## 2017-06-01 RX ORDER — VALACYCLOVIR HYDROCHLORIDE 1 G/1
1000 TABLET, FILM COATED ORAL DAILY
Qty: 30 TABLET | Refills: 11
Start: 2017-06-01 | End: 2017-07-18 | Stop reason: ALTCHOICE

## 2017-06-01 NOTE — TELEPHONE ENCOUNTER
Reviewed labs and symptoms with Dr. Pacheco. Per review pt is to increase rapamune to 2 mg and get repeat labs with CMV PCR.     Called pt and left message on VM to call back when available.

## 2017-06-01 NOTE — TELEPHONE ENCOUNTER
Pt called and reviewed labs with pt. Pt reported that she was started on Valcyclovir 1gm for 10 by her PCP for mouth sores. Pt reported cultures were taken and CMV PCR was done locally. Instructed pt to increase her rapa dose from 1 mg to 2 mg daily and get repeat labs locally. Pt verbalized understanding, no other questions asked. Pt read back and verbalized understanding.

## 2017-06-03 ENCOUNTER — TELEPHONE (OUTPATIENT)
Dept: TRANSPLANT | Facility: HOSPITAL | Age: 62
End: 2017-06-03

## 2017-06-03 DIAGNOSIS — I10 ESSENTIAL HYPERTENSION: Primary | ICD-10-CM

## 2017-06-03 DIAGNOSIS — Z94.1 S/P ORTHOTOPIC HEART TRANSPLANT: ICD-10-CM

## 2017-06-03 RX ORDER — LISINOPRIL 5 MG/1
5 TABLET ORAL 2 TIMES DAILY
Qty: 180 TABLET | Refills: 3
Start: 2017-06-03 | End: 2017-06-06 | Stop reason: SDUPTHER

## 2017-06-04 NOTE — TELEPHONE ENCOUNTER
Patient called to report 's/90's today and had changed amlodipine 5 mg to lisinopril 2.5 mg daily.  I will have her take 5 mg BID and call BP readings next week.  She has lab due Monday and will be in touch with Enriqueta, her coordinator to review,  Will send note to her and ask she arrange for BMP in 2 weeks on increased dose in [place]

## 2017-06-05 ENCOUNTER — TELEPHONE (OUTPATIENT)
Dept: TRANSPLANT | Facility: CLINIC | Age: 62
End: 2017-06-05

## 2017-06-05 DIAGNOSIS — I10 ESSENTIAL HYPERTENSION: ICD-10-CM

## 2017-06-05 LAB
EXT HEMATOCRIT: 38.1
EXT HEMOGLOBIN: 12
EXT PLATELETS: 148
EXT WBC: 3.4

## 2017-06-05 NOTE — TELEPHONE ENCOUNTER
Received outside labs pending prograf, rapa , CMV PCR     Called pt regarding labs and BP over the weekend, d/t increase of BP medications and elevated BP's called in to on call physician.   No answer LM on VM for pt to call back when available.

## 2017-06-06 ENCOUNTER — PATIENT MESSAGE (OUTPATIENT)
Dept: TRANSPLANT | Facility: CLINIC | Age: 62
End: 2017-06-06

## 2017-06-06 RX ORDER — LISINOPRIL 5 MG/1
2.5 TABLET ORAL DAILY
Qty: 90 TABLET | Refills: 3 | Status: SHIPPED | OUTPATIENT
Start: 2017-06-06 | End: 2018-03-15 | Stop reason: SDUPTHER

## 2017-06-06 NOTE — TELEPHONE ENCOUNTER
Spoke with Pt. Pt reported that she has been taking lisinopril 2.5 mg daily instead of 5 mg BID as instructed over the weekend. Pt reported she took an extra 2.5 mg on Saturday 6/2/17. Pt reports her Blood pressures are within normal and continue her 2.5 mg daily and call with any changes.   Pt requested to refill her lisinopril medications     Message this AM     Good morning Samira,    Blood pressure reading yesterday June 5 pm. 124/81 pulse 111  temperature 98.3   June 6 am. 118/85. Pulse 113. Temperature this morning 98.6   I took BP lisinopril 2.5 mg this morning      Looks like I need a refill on that new blood pressure medicine and  Rapamune.   I'll call Ms Jeronimo in  pharmacy today so we can be mailed out to me and receive it tomorrow .     I'll call your you later this morning.  Cassandra Gonzales

## 2017-06-09 ENCOUNTER — TELEPHONE (OUTPATIENT)
Dept: TRANSPLANT | Facility: CLINIC | Age: 62
End: 2017-06-09

## 2017-06-09 ENCOUNTER — PATIENT MESSAGE (OUTPATIENT)
Dept: TRANSPLANT | Facility: CLINIC | Age: 62
End: 2017-06-09

## 2017-06-09 LAB
BNP (B-TYPE NATRIURETIC PEP): 84
EXT ALBUMIN: 3.9
EXT ALT: 37
EXT AST: 49
EXT BUN: 16
EXT CALCIUM: 8.7
EXT CHLORIDE: 108
EXT CREATININE: 0.83 MG/DL
EXT GLUCOSE: 83
EXT HEMATOCRIT: 38.1
EXT HEMOGLOBIN: 12
EXT PLATELETS: 148
EXT POTASSIUM: 4.2
EXT PROTEIN TOTAL: 6.8
EXT SIROLIMUS LVL: 6.7
EXT SODIUM: 141 MMOL/L
EXT TACROLIMUS LVL: 3.5
EXT WBC: 3.4

## 2017-06-09 NOTE — TELEPHONE ENCOUNTER
Called pt to inform her that her labs were within goal range and that we have not received her cultures from her mouth sores. No answer, Instructed pt to call back when available.       Returned call to pt. No answer. LM on VM to call back.

## 2017-06-14 ENCOUNTER — TELEPHONE (OUTPATIENT)
Dept: TRANSPLANT | Facility: CLINIC | Age: 62
End: 2017-06-14

## 2017-06-14 LAB
BNP (B-TYPE NATRIURETIC PEP): 63
EXT ALBUMIN: 3.8
EXT ALT: 31
EXT AST: 42
EXT BUN: 16
EXT CALCIUM: 8.9
EXT CHLORIDE: 108
EXT CREATININE: 0.86 MG/DL
EXT GLUCOSE: 90
EXT HEMATOCRIT: 37.4
EXT HEMOGLOBIN: 11.9
EXT MAGNESIUM: 1.8
EXT PLATELETS: 153
EXT POTASSIUM: 4.8
EXT PROTEIN TOTAL: 6.7
EXT SIROLIMUS LVL: 8.9
EXT SODIUM: 141 MMOL/L
EXT TACROLIMUS LVL: 4.6
EXT WBC: 2.8

## 2017-06-15 ENCOUNTER — PATIENT MESSAGE (OUTPATIENT)
Dept: TRANSPLANT | Facility: CLINIC | Age: 62
End: 2017-06-15

## 2017-06-23 ENCOUNTER — TELEPHONE (OUTPATIENT)
Dept: TRANSPLANT | Facility: CLINIC | Age: 62
End: 2017-06-23

## 2017-06-23 LAB
BNP (B-TYPE NATRIURETIC PEP): 81
EXT ALBUMIN: 3.9
EXT ALT: 33
EXT AST: 45
EXT BUN: 22
EXT CALCIUM: 8.8
EXT CHLORIDE: 107
EXT CREATININE: 0.87 MG/DL
EXT GLUCOSE: 85
EXT HEMATOCRIT: 38.5
EXT HEMOGLOBIN: NORMAL
EXT MAGNESIUM: 1.8
EXT PLATELETS: 169
EXT POTASSIUM: 5
EXT PROTEIN TOTAL: 6.9
EXT SIROLIMUS LVL: 7.7
EXT SODIUM: 141 MMOL/L
EXT TACROLIMUS LVL: 3.5
EXT WBC: 2.9

## 2017-06-23 NOTE — TELEPHONE ENCOUNTER
Called lab and requested results from 6/21/17. Confirmed fax number, results per tech are being faxed over.

## 2017-06-23 NOTE — TELEPHONE ENCOUNTER
Received outside labs from [facility]. All labs wnl. @ consecutive labs within goal range. No changes at this time. Will inform pt no changes at this time and we will follow up with labs a 3 month follow up.

## 2017-06-23 NOTE — TELEPHONE ENCOUNTER
Received outside labs from [facility]. All labs wnl. @ consecutive labs within goal range. No changes at this time. Will inform pt no changes at this time and we will follow up with labs a 3 month follow up.   Called pt to informed her of the following, no answer. LM on  to call back and above information.

## 2017-06-26 ENCOUNTER — TELEPHONE (OUTPATIENT)
Dept: TRANSPLANT | Facility: CLINIC | Age: 62
End: 2017-06-26

## 2017-06-26 ENCOUNTER — PATIENT MESSAGE (OUTPATIENT)
Dept: TRANSPLANT | Facility: CLINIC | Age: 62
End: 2017-06-26

## 2017-06-26 NOTE — TELEPHONE ENCOUNTER
Called pt regarding email and call over the weekend to Dr. Brunson regarding rash on both hands. LM on VM to call back.

## 2017-06-26 NOTE — TELEPHONE ENCOUNTER
Received call back from pt regarding derm visit locally. Pt reported that her Derm will be able to see her on 6/28/17 at 0900. Verbalized understanding and informed pt to call back if her symptoms worsen before her visit. Pt verbalized understanding, no other questions asked.

## 2017-06-26 NOTE — TELEPHONE ENCOUNTER
Received pt's pictures of her rash on bilat hands. Per review. Called pt to see if she would like to come her to see dermatology or see a local dermatologist in [place]. No answer. LM on  for pt to call back when available.

## 2017-06-27 ENCOUNTER — PATIENT MESSAGE (OUTPATIENT)
Dept: TRANSPLANT | Facility: CLINIC | Age: 62
End: 2017-06-27

## 2017-06-30 ENCOUNTER — PATIENT MESSAGE (OUTPATIENT)
Dept: TRANSPLANT | Facility: CLINIC | Age: 62
End: 2017-06-30

## 2017-07-03 ENCOUNTER — PATIENT MESSAGE (OUTPATIENT)
Dept: TRANSPLANT | Facility: CLINIC | Age: 62
End: 2017-07-03

## 2017-07-06 ENCOUNTER — PATIENT MESSAGE (OUTPATIENT)
Dept: TRANSPLANT | Facility: CLINIC | Age: 62
End: 2017-07-06

## 2017-07-06 ENCOUNTER — TELEPHONE (OUTPATIENT)
Dept: TRANSPLANT | Facility: CLINIC | Age: 62
End: 2017-07-06

## 2017-07-06 NOTE — TELEPHONE ENCOUNTER
Called pt and left message on VM to call back when available regarding dermatologist notes and biopsy,.

## 2017-07-08 RX ORDER — SIROLIMUS 1 MG/1
2 TABLET, FILM COATED ORAL DAILY
Qty: 10 TABLET | Refills: 0 | Status: SHIPPED | OUTPATIENT
Start: 2017-07-08 | End: 2017-07-12 | Stop reason: DRUGHIGH

## 2017-07-10 ENCOUNTER — TELEPHONE (OUTPATIENT)
Dept: TRANSPLANT | Facility: CLINIC | Age: 62
End: 2017-07-10

## 2017-07-11 NOTE — TELEPHONE ENCOUNTER
Spoke with pt and informed that I will bring her chart up in chart review tomorrow to discuss plan on switching from rapamune to Everolimus d/t rash and mouth sores. Pt verbalized understanding, no other questions asked at present.

## 2017-07-12 ENCOUNTER — TELEPHONE (OUTPATIENT)
Dept: TRANSPLANT | Facility: CLINIC | Age: 62
End: 2017-07-12

## 2017-07-12 NOTE — TELEPHONE ENCOUNTER
Called pt with update from chart review. Informed pt to d/c rapa and increase prograf to 2/2 and get repeat labs on Monday weekly until her level is within goal range. Pt verbalized understanding, no other questions asked.

## 2017-07-12 NOTE — TELEPHONE ENCOUNTER
Call pt. Informed her to call back when available to discuss changes per chart review.     Per review with Dr. Negron and PharmD:    Daniela Abdi MD   Cc: Samira Munson LPN             Sounds good, Tanesha, can you confirm that she is on MMF 1000mg BID currently?  If so, Lets aim for tacrolimus 6-8 and stop the sirolimus with close f/u of adverse effects.  If they resolve, will send another plan for EVL initiation.       -Abhijeet    Previous Messages      ----- Message -----   From: Alyssa Negron MD   Sent: 7/11/2017   8:03 PM   To: Abhijeet Mccray PharmD, Samira Munson LPN   Subject: RE: plan for switch to everolimus                 Yes, I am good with that, that was our original thought but then we remembered she had significant CAV. Let's do that first, see resolution, then try mtor again. Thank you..   ----- Message -----   From: Abhijeet Mccray PharmD   Sent: 7/11/2017   4:46 PM   To: Alyssa Negron MD, Samira Munson LPN   Subject: RE: plan for switch to everolimus                 Any thoughts on just stopping the sirolimus while increasing the tac goal for now, and if the derm issues settle, re-introducing EVL?     -Abhijeet     ----- Message -----   From: Samira Munson LPN   Sent: 7/11/2017  11:33 AM   To: Alyssa Negron MD, Abhijeet Mccray PharmD   Subject: plan for switch to everolimus                     Neptali garvin,     I believe I spoke with you regarding Mrs. Gonzales. She has been having a rash on bilat arms that have now spread to her elbows for almost 3 week (1.5 weeks known to team ) Pt saw derm and had biopsy done, results were Acute Spongiotic dermatitis. Also she has mouth sores that are back for the second time in 4 weeks. Pt currently on 2nd dose of Valtrex.   Per review with Dr. Negron, we were going to try to switch to everolimus and discuss in chart review today.       Thanks Samira

## 2017-07-18 ENCOUNTER — PATIENT MESSAGE (OUTPATIENT)
Dept: TRANSPLANT | Facility: CLINIC | Age: 62
End: 2017-07-18

## 2017-07-18 RX ORDER — EVEROLIMUS 0.5 MG/1
1 TABLET ORAL 2 TIMES DAILY
Qty: 360 TABLET | Refills: 3 | Status: SHIPPED | OUTPATIENT
Start: 2017-07-18 | End: 2018-06-22 | Stop reason: SDUPTHER

## 2017-07-18 RX ORDER — TACROLIMUS 1 MG/1
CAPSULE ORAL
Qty: 240 CAPSULE | Refills: 11 | Status: ON HOLD | OUTPATIENT
Start: 2017-07-18 | End: 2017-08-10

## 2017-07-18 NOTE — TELEPHONE ENCOUNTER
Pt returned call to team. Updated pt on review with team regarding medications changes.     Pt's labs reviewed. Pt's prograf level was 13 on 2/2 of prograf. Instructed pt to decrease prograf to 1/2.Also, currently on Cellcept 1000/1000       Also updated pt on review with Dr. Negron and RICHARD Mccray PharmD .      Pt is to start Everolimus 1 mg bid , the decrease prograf to 1/1 bid when starting everolimus. Get repeat labs 5 days after starting medications. ( Wednesday 7/26/17) Goal everolimus 3-8, prograf goal 3-6.       Pt read back changes and verbalized understanding of repeat labs in 5 days after start of everolimus. Everolimus 1 mg BID, start prograf 1/1 bid when starting everolimus .

## 2017-07-18 NOTE — TELEPHONE ENCOUNTER
Reviewed medications and mychart message from pt that stated rash is completely gone after stopping rapamune and mouth sores were all healed. Reviewed with Dr. Negron and the PharmD ABRAHAN Mccray and instructed pt to decrease prograf to 1/1 and Start Everolimus to 1 mg twice daily with goals prograf 3-6 and Everolimus 3-8 and get repeat labs after 5 days of starting the Everolimus.     Called pt no answer LM and sent [facility] message to pt to call back with update on medications and symptoms/

## 2017-07-20 ENCOUNTER — TELEPHONE (OUTPATIENT)
Dept: TRANSPLANT | Facility: CLINIC | Age: 62
End: 2017-07-20

## 2017-07-20 ENCOUNTER — PATIENT MESSAGE (OUTPATIENT)
Dept: TRANSPLANT | Facility: CLINIC | Age: 62
End: 2017-07-20

## 2017-07-20 LAB
EXT ALBUMIN: 3.9
EXT ALT: 26
EXT AST: 38
EXT BUN: 20
EXT CALCIUM: 9.3
EXT CHLORIDE: 107
EXT CREATININE: 0.89 MG/DL
EXT GLUCOSE: 94
EXT HEMATOCRIT: 37.8
EXT HEMOGLOBIN: 12
EXT MAGNESIUM: 1.7
EXT PLATELETS: 187
EXT POTASSIUM: 5.4
EXT PROTEIN TOTAL: 7.1
EXT SIROLIMUS LVL: 2.9
EXT SODIUM: 140 MMOL/L
EXT TACROLIMUS LVL: 13.3
EXT WBC: 3.7

## 2017-07-24 ENCOUNTER — PATIENT MESSAGE (OUTPATIENT)
Dept: TRANSPLANT | Facility: CLINIC | Age: 62
End: 2017-07-24

## 2017-07-24 ENCOUNTER — TELEPHONE (OUTPATIENT)
Dept: TRANSPLANT | Facility: CLINIC | Age: 62
End: 2017-07-24

## 2017-07-24 LAB
BNP (B-TYPE NATRIURETIC PEP): 75
EXT ALBUMIN: 3.8
EXT ALT: 33
EXT AST: 76
EXT BUN: 24
EXT CALCIUM: 9.1
EXT CHLORIDE: 106
EXT CREATININE: 0.9 MG/DL
EXT GLUCOSE: 93
EXT HEMATOCRIT: 35.3
EXT HEMOGLOBIN: 11.6
EXT MAGNESIUM: 1.7
EXT PLATELETS: 236
EXT POTASSIUM: 5.1
EXT PROTEIN TOTAL: 6.8
EXT SODIUM: 138 MMOL/L
EXT WBC: 4.8

## 2017-07-27 ENCOUNTER — PATIENT MESSAGE (OUTPATIENT)
Dept: TRANSPLANT | Facility: CLINIC | Age: 62
End: 2017-07-27

## 2017-07-31 ENCOUNTER — TELEPHONE (OUTPATIENT)
Dept: TRANSPLANT | Facility: CLINIC | Age: 62
End: 2017-07-31

## 2017-07-31 LAB
BNP (B-TYPE NATRIURETIC PEP): 62
EXT ALBUMIN: 3.8
EXT ALT: 25
EXT AST: 34
EXT BUN: 26
EXT CALCIUM: 9.2
EXT CHLORIDE: 107
EXT CREATININE: 0.84 MG/DL
EXT GLUCOSE: 84
EXT HEMATOCRIT: 36.5
EXT HEMOGLOBIN: 11.5
EXT MAGNESIUM: 1.9
EXT PLATELETS: 218
EXT POTASSIUM: 5
EXT PROTEIN TOTAL: 6.7
EXT SODIUM: 139 MMOL/L
EXT WBC: 4.2

## 2017-08-03 ENCOUNTER — TELEPHONE (OUTPATIENT)
Dept: TRANSPLANT | Facility: CLINIC | Age: 62
End: 2017-08-03

## 2017-08-03 ENCOUNTER — PATIENT MESSAGE (OUTPATIENT)
Dept: TRANSPLANT | Facility: CLINIC | Age: 62
End: 2017-08-03

## 2017-08-03 LAB — EXT EVEROLIMUS LEVEL: 4.5

## 2017-08-07 ENCOUNTER — HOSPITAL ENCOUNTER (INPATIENT)
Facility: HOSPITAL | Age: 62
LOS: 3 days | Discharge: HOME | DRG: 603 | End: 2017-08-10
Attending: INTERNAL MEDICINE | Admitting: INTERNAL MEDICINE
Payer: COMMERCIAL

## 2017-08-07 ENCOUNTER — PATIENT MESSAGE (OUTPATIENT)
Dept: TRANSPLANT | Facility: CLINIC | Age: 62
End: 2017-08-07

## 2017-08-07 ENCOUNTER — TELEPHONE (OUTPATIENT)
Dept: TRANSPLANT | Facility: CLINIC | Age: 62
End: 2017-08-07

## 2017-08-07 ENCOUNTER — NURSE TRIAGE (OUTPATIENT)
Dept: ADMINISTRATIVE | Facility: CLINIC | Age: 62
End: 2017-08-07

## 2017-08-07 DIAGNOSIS — L03.211 CELLULITIS, FACE: ICD-10-CM

## 2017-08-07 DIAGNOSIS — Z94.1 HEART TRANSPLANTED: ICD-10-CM

## 2017-08-07 DIAGNOSIS — D84.9 IMMUNOSUPPRESSION: ICD-10-CM

## 2017-08-07 DIAGNOSIS — Z94.1 HEART REPLACED BY TRANSPLANT: Primary | ICD-10-CM
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
## 2017-08-07 DIAGNOSIS — Z94.1 STATUS POST HEART TRANSPLANT: ICD-10-CM

## 2017-08-07 DIAGNOSIS — T86.290 CARDIAC ALLOGRAFT VASCULOPATHY: ICD-10-CM

## 2017-08-07 DIAGNOSIS — R22.0 LEFT FACIAL SWELLING: Primary | ICD-10-CM

## 2017-08-07 PROCEDURE — 25000003 PHARM REV CODE 250: Performed by: PHYSICIAN ASSISTANT

## 2017-08-07 PROCEDURE — 36415 COLL VENOUS BLD VENIPUNCTURE: CPT

## 2017-08-07 PROCEDURE — 20600001 HC STEP DOWN PRIVATE ROOM

## 2017-08-07 PROCEDURE — 99223 1ST HOSP IP/OBS HIGH 75: CPT | Mod: ,,, | Performed by: INTERNAL MEDICINE

## 2017-08-07 PROCEDURE — 87040 BLOOD CULTURE FOR BACTERIA: CPT

## 2017-08-07 PROCEDURE — 63600175 PHARM REV CODE 636 W HCPCS: Performed by: PHYSICIAN ASSISTANT

## 2017-08-07 RX ORDER — SULFAMETHOXAZOLE AND TRIMETHOPRIM 400; 80 MG/1; MG/1
1 TABLET ORAL DAILY
Status: DISCONTINUED | OUTPATIENT
Start: 2017-08-08 | End: 2017-08-10 | Stop reason: HOSPADM

## 2017-08-07 RX ORDER — LISINOPRIL 2.5 MG/1
2.5 TABLET ORAL DAILY
Status: DISCONTINUED | OUTPATIENT
Start: 2017-08-08 | End: 2017-08-10 | Stop reason: HOSPADM

## 2017-08-07 RX ORDER — ATORVASTATIN CALCIUM 20 MG/1
40 TABLET, FILM COATED ORAL DAILY
Status: DISCONTINUED | OUTPATIENT
Start: 2017-08-08 | End: 2017-08-10 | Stop reason: HOSPADM

## 2017-08-07 RX ORDER — MYCOPHENOLATE MOFETIL 250 MG/1
1000 CAPSULE ORAL 2 TIMES DAILY
Status: DISCONTINUED | OUTPATIENT
Start: 2017-08-07 | End: 2017-08-10 | Stop reason: HOSPADM

## 2017-08-07 RX ORDER — LEVOTHYROXINE SODIUM 75 UG/1
75 TABLET ORAL
Status: DISCONTINUED | OUTPATIENT
Start: 2017-08-08 | End: 2017-08-10 | Stop reason: HOSPADM

## 2017-08-07 RX ORDER — ASPIRIN 81 MG/1
81 TABLET ORAL DAILY
Status: DISCONTINUED | OUTPATIENT
Start: 2017-08-08 | End: 2017-08-10 | Stop reason: HOSPADM

## 2017-08-07 RX ORDER — EVEROLIMUS 0.5 MG/1
1 TABLET ORAL 2 TIMES DAILY
Status: DISCONTINUED | OUTPATIENT
Start: 2017-08-07 | End: 2017-08-10 | Stop reason: HOSPADM

## 2017-08-07 RX ORDER — ACETAMINOPHEN 325 MG/1
650 TABLET ORAL EVERY 4 HOURS PRN
Status: DISCONTINUED | OUTPATIENT
Start: 2017-08-07 | End: 2017-08-10 | Stop reason: HOSPADM

## 2017-08-07 RX ORDER — TACROLIMUS 1 MG/1
1 CAPSULE ORAL 2 TIMES DAILY
Status: DISCONTINUED | OUTPATIENT
Start: 2017-08-07 | End: 2017-08-10 | Stop reason: HOSPADM

## 2017-08-07 RX ADMIN — TACROLIMUS 1 MG: 1 CAPSULE ORAL at 05:08

## 2017-08-07 RX ADMIN — EVEROLIMUS 1 MG: 0.5 TABLET ORAL at 08:08

## 2017-08-07 RX ADMIN — PIPERACILLIN AND TAZOBACTAM 4.5 G: 4; .5 INJECTION, POWDER, LYOPHILIZED, FOR SOLUTION INTRAVENOUS; PARENTERAL at 07:08

## 2017-08-07 RX ADMIN — MYCOPHENOLATE MOFETIL 1000 MG: 250 CAPSULE ORAL at 08:08

## 2017-08-07 RX ADMIN — VANCOMYCIN HYDROCHLORIDE 1000 MG: 1 INJECTION, POWDER, LYOPHILIZED, FOR SOLUTION INTRAVENOUS at 05:08

## 2017-08-07 NOTE — SUBJECTIVE & OBJECTIVE
Past Medical History:   Diagnosis Date    Anticoagulant long-term use     Cardiomyopathy, dilated, nonischemic 2015    CHF (congestive heart failure)     CHF (NYHA class III, ACC/AHA stage C) 2015    Encounter for blood transfusion     Hypertension     Hypothyroidism (acquired)     Peripartum cardiomyopathy 2015    Stroke     TIA in 2014       Past Surgical History:   Procedure Laterality Date    CARDIAC DEFIBRILLATOR PLACEMENT      CARDIAC DEFIBRILLATOR REMOVAL  2016    CARDIAC VALVE REPLACEMENT       SECTION      heart transplanted  2016    right breast tumor         Review of patient's allergies indicates:   Allergen Reactions    Rapamune [sirolimus]      Rash bilat arms        Current Facility-Administered Medications   Medication    acetaminophen tablet 650 mg    [START ON 2017] aspirin EC tablet 81 mg    [START ON 2017] atorvastatin tablet 40 mg    everolimus tablet 1 mg    [START ON 2017] levothyroxine tablet 75 mcg    [START ON 2017] lisinopril tablet 2.5 mg    [START ON 2017] multivitamin tablet 1 tablet    mycophenolate capsule 1,000 mg    piperacillin-tazobactam 4.5 g in dextrose 5 % 100 mL IVPB (ready to mix system)    [START ON 2017] sulfamethoxazole-trimethoprim 400-80mg per tablet 1 tablet    tacrolimus capsule 1 mg    vancomycin 1 g in dextrose 5 % 250 mL IVPB (ready to mix system)     Family History     Problem Relation (Age of Onset)    Cancer Father    Heart disease Mother    Hypertension Mother    No Known Problems Sister, Brother, Maternal Aunt, Maternal Uncle, Paternal Aunt, Paternal Uncle, Maternal Grandmother, Maternal Grandfather, Paternal Grandmother, Paternal Grandfather        Social History Main Topics    Smoking status: Never Smoker    Smokeless tobacco: Never Used    Alcohol use No    Drug use: No    Sexual activity: Not on file     Review of Systems   Constitutional: Negative for chills,  fatigue and fever.   Eyes: Negative for photophobia, pain, redness and visual disturbance.   Respiratory: Negative for cough and shortness of breath.    Cardiovascular: Negative for chest pain and leg swelling.   Gastrointestinal: Negative for diarrhea, nausea and vomiting.   Neurological: Negative for dizziness and light-headedness.     Objective:     Vital Signs (Most Recent):  Temp: 98.3 °F (36.8 °C) (08/07/17 1420)  Pulse: 105 (08/07/17 1420)  Resp: 16 (08/07/17 1420)  BP: (!) 154/84 (08/07/17 1420)  SpO2: 100 % (08/07/17 1420) Vital Signs (24h Range):  Temp:  [98.3 °F (36.8 °C)] 98.3 °F (36.8 °C)  Pulse:  [105] 105  Resp:  [16] 16  SpO2:  [100 %] 100 %  BP: (154)/(84) 154/84     Weight: 56.5 kg (124 lb 9 oz)  Body mass index is 22.06 kg/m².    No intake or output data in the 24 hours ending 08/07/17 1745    Physical Exam   Constitutional: She is oriented to person, place, and time. She appears well-developed and well-nourished.   HENT:   Head: Head is with left periorbital erythema (with L facial edema right below cheek).   Eyes: EOM are normal. Pupils are equal, round, and reactive to light. Left conjunctiva is not injected.   Neck: Normal range of motion. Neck supple. No JVD present.   Cardiovascular: Normal rate and regular rhythm.  Exam reveals no gallop and no friction rub.    No murmur heard.  Pulmonary/Chest: Effort normal and breath sounds normal. She has no wheezes. She has no rales.   Abdominal: Soft. Bowel sounds are normal. There is no tenderness.   Musculoskeletal: She exhibits no edema.   Neurological: She is alert and oriented to person, place, and time.   Skin: Skin is warm and dry.       Significant Labs:  CBC:    Recent Labs  Lab 08/07/17  1043   WBC 4.15   RBC 4.17   HGB 11.8*   HCT 36.3*      MCV 87   MCH 28.3   MCHC 32.5     BNP:    Recent Labs  Lab 08/07/17  1043   BNP 86     CMP:    Recent Labs  Lab 08/07/17  1043   GLU 81   CALCIUM 9.8   ALBUMIN 4.1   PROT 7.5      K 4.9    CO2 25      BUN 19   CREATININE 0.9   ALKPHOS 86   ALT 24   AST 35   BILITOT 0.6      Coagulation:   No results for input(s): INR, APTT in the last 72 hours.    Invalid input(s): PT  LDH:  No results for input(s): LDH in the last 72 hours.  Microbiology:  Microbiology Results (last 7 days)     Procedure Component Value Units Date/Time    Blood culture [[id number]] Collected:  08/07/17 1651    Order Status:  Sent Specimen:  Blood Updated:  08/07/17 1651          I have reviewed all pertinent labs within the past 24 hours.    Diagnostic Results:  I have reviewed all pertinent imaging results/findings within the past 24 hours.

## 2017-08-07 NOTE — ASSESSMENT & PLAN NOTE
-No F/C or leukocytosis  -Spoke with Transplant ID: Start Vanc, Zosyn; get CT max facial; get blood cultures; then will see pt and follow with us

## 2017-08-07 NOTE — TELEPHONE ENCOUNTER
Pt came to clinic.facial swelling and redness noted. Reviewed with Dr. Negron, pt sent to Admitting office and lab to be admitted.

## 2017-08-07 NOTE — H&P
[facility]  Heart Transplant  H&P    Patient Name: Cassandra Gonzales  MRN: [id number]  Admission Date: 2017  Attending Physician: Adal Grimes MD  Primary Care Provider: Esha Tee MD  Principal Problem:<principal problem not specified>    Subjective:     History of Present Illness:  61 yo WF s/p OHTx 3/25/16 for peripartum CMP,moderate cellular rejection (ISHLT 2) per path report 2016, CAV by [facility] 2016 now on Tacro and everolimus (stopped Rapa due to mouth ulcers and bilat hand rash),  CMV mismatch, s/p CMV infection, h/o mouth HSV treated with Valcyte, HTN, hypothyroidism and HLP presents with L sided facial swelling and erythema after her puppy scratched her Th night 8/3/17.Puppy is 3 months old and up to date on vaccinations. SHe says she had a superficial scratch at first which she cleaned with peroxide and neosporin. Swelling and redness becamse worse so she called here and was told to increase Bactrim to 2 pills BID. Swelling and redness today were even worse on L side of her face and around L eye and so she was told to come here for admission. She is not taking anything OTC. No F/C, cardiopulmonary symptoms, vision changes.    Past Medical History:   Diagnosis Date    Anticoagulant long-term use     Cardiomyopathy, dilated, nonischemic 2015    CHF (congestive heart failure)     CHF (NYHA class III, ACC/AHA stage C) 2015    Encounter for blood transfusion     Hypertension     Hypothyroidism (acquired)     Peripartum cardiomyopathy 2015    Stroke     TIA in 2014       Past Surgical History:   Procedure Laterality Date    CARDIAC DEFIBRILLATOR PLACEMENT      CARDIAC DEFIBRILLATOR REMOVAL  2016    CARDIAC VALVE REPLACEMENT       SECTION      heart transplanted  2016    right breast tumor         Review of patient's allergies indicates:   Allergen Reactions    Rapamune [sirolimus]      Rash bilat arms         Current Facility-Administered Medications   Medication    acetaminophen tablet 650 mg    [START ON 8/8/2017] aspirin EC tablet 81 mg    [START ON 8/8/2017] atorvastatin tablet 40 mg    everolimus tablet 1 mg    [START ON 8/8/2017] levothyroxine tablet 75 mcg    [START ON 8/8/2017] lisinopril tablet 2.5 mg    [START ON 8/8/2017] multivitamin tablet 1 tablet    mycophenolate capsule 1,000 mg    piperacillin-tazobactam 4.5 g in dextrose 5 % 100 mL IVPB (ready to mix system)    [START ON 8/8/2017] sulfamethoxazole-trimethoprim 400-80mg per tablet 1 tablet    tacrolimus capsule 1 mg    vancomycin 1 g in dextrose 5 % 250 mL IVPB (ready to mix system)     Family History     Problem Relation (Age of Onset)    Cancer Father    Heart disease Mother    Hypertension Mother    No Known Problems Sister, Brother, Maternal Aunt, Maternal Uncle, Paternal Aunt, Paternal Uncle, Maternal Grandmother, Maternal Grandfather, Paternal Grandmother, Paternal Grandfather        Social History Main Topics    Smoking status: Never Smoker    Smokeless tobacco: Never Used    Alcohol use No    Drug use: No    Sexual activity: Not on file     Review of Systems   Constitutional: Negative for chills, fatigue and fever.   Eyes: Negative for photophobia, pain, redness and visual disturbance.   Respiratory: Negative for cough and shortness of breath.    Cardiovascular: Negative for chest pain and leg swelling.   Gastrointestinal: Negative for diarrhea, nausea and vomiting.   Neurological: Negative for dizziness and light-headedness.     Objective:     Vital Signs (Most Recent):  Temp: 98.3 °F (36.8 °C) (08/07/17 1420)  Pulse: 105 (08/07/17 1420)  Resp: 16 (08/07/17 1420)  BP: (!) 154/84 (08/07/17 1420)  SpO2: 100 % (08/07/17 1420) Vital Signs (24h Range):  Temp:  [98.3 °F (36.8 °C)] 98.3 °F (36.8 °C)  Pulse:  [105] 105  Resp:  [16] 16  SpO2:  [100 %] 100 %  BP: (154)/(84) 154/84     Weight: 56.5 kg (124 lb 9 oz)  Body mass index  is 22.06 kg/m².    No intake or output data in the 24 hours ending 08/07/17 6215    Physical Exam   Constitutional: She is oriented to person, place, and time. She appears well-developed and well-nourished.   HENT:   Head: Head is with left periorbital erythema (with L facial edema right below cheek).   Eyes: EOM are normal. Pupils are equal, round, and reactive to light. Left conjunctiva is not injected.   Neck: Normal range of motion. Neck supple. No JVD present.   Cardiovascular: Normal rate and regular rhythm.  Exam reveals no gallop and no friction rub.    No murmur heard.  Pulmonary/Chest: Effort normal and breath sounds normal. She has no wheezes. She has no rales.   Abdominal: Soft. Bowel sounds are normal. There is no tenderness.   Musculoskeletal: She exhibits no edema.   Neurological: She is alert and oriented to person, place, and time.   Skin: Skin is warm and dry.       Significant Labs:  CBC:    Recent Labs  Lab 08/07/17  1043   WBC 4.15   RBC 4.17   HGB 11.8*   HCT 36.3*      MCV 87   MCH 28.3   MCHC 32.5     BNP:    Recent Labs  Lab 08/07/17  1043   BNP 86     CMP:    Recent Labs  Lab 08/07/17  1043   GLU 81   CALCIUM 9.8   ALBUMIN 4.1   PROT 7.5      K 4.9   CO2 25      BUN 19   CREATININE 0.9   ALKPHOS 86   ALT 24   AST 35   BILITOT 0.6      Coagulation:   No results for input(s): INR, APTT in the last 72 hours.    Invalid input(s): PT  LDH:  No results for input(s): LDH in the last 72 hours.  Microbiology:  Microbiology Results (last 7 days)     Procedure Component Value Units Date/Time    Blood culture [[id number]] Collected:  08/07/17 1651    Order Status:  Sent Specimen:  Blood Updated:  08/07/17 1651          I have reviewed all pertinent labs within the past 24 hours.    Diagnostic Results:  I have reviewed all pertinent imaging results/findings within the past 24 hours.    Assessment/Plan:       Left facial swelling    -No F/C or leukocytosis  -Spoke with Transplant ID:  Start Vanc, Zosyn; get CT max facial; get blood cultures; then will see pt and follow with us        Heart transplanted    -h/o cellular rejection and CAV  -Cont tacro, everolimus and follow levels. Cont MMF for now          Cardiac allograft vasculopathy    -Cont ASA, statin, tacro/everolimus        Hypothyroidism (acquired)    -Cont Synthroid            Mindy Mast PA-C  Heart Transplant  [facility]

## 2017-08-07 NOTE — HPI
61 yo WF s/p OHTx 3/25/16 for peripartum CMP,moderate cellular rejection (ISHLT 2) per path report 12/2016, CAV by C 4/2016 now on Tacro and everolimus (stopped Rapa due to mouth ulcers and bilat hand rash),  CMV mismatch, s/p CMV infection, h/o mouth HSV treated with Valcyte, HTN, hypothyroidism and HLP presents with L sided facial swelling and erythema after her puppy scratched her Thurs night 8/3/17.Puppy is 3 months old and up to date on vaccinations. SHe says she had a superficial scratch at first which she cleaned with peroxide and neosporin. Swelling and redness becamse worse so she called here and was told to increase Bactrim to 2 pills BID. Swelling and redness today were even worse on L side of her face and around L eye and so she was told to come here for admission. She is not taking anything OTC. No F/C, cardiopulmonary symptoms, vision changes.

## 2017-08-07 NOTE — TELEPHONE ENCOUNTER
Called MD Dr. Lay on call for heart transplant on yesterday and explained situation about eye being scratched by her puppy and swelling on right side of face. MD adjusted medication Bactrim. Woke up this morning and swelling has gotten worse. Wanting to schedule an appt. Left eye was swollen but not as bad as it is now. Call transferred to Dr. Lay on call provider .    Reason for Disposition   Nursing judgment    Protocols used: ST NO GUIDELINE OR REFERENCE [id number]

## 2017-08-08 PROBLEM — L03.211 CELLULITIS, FACE: Status: ACTIVE | Noted: 2017-08-08

## 2017-08-08 LAB
ALBUMIN SERPL BCP-MCNC: 3.6 G/DL
ALP SERPL-CCNC: 75 U/L
ALT SERPL W/O P-5'-P-CCNC: 22 U/L
ANION GAP SERPL CALC-SCNC: 10 MMOL/L
AST SERPL-CCNC: 33 U/L
BASOPHILS # BLD AUTO: 0.03 K/UL
BASOPHILS NFR BLD: 0.8 %
BILIRUB SERPL-MCNC: 0.4 MG/DL
BUN SERPL-MCNC: 16 MG/DL
CALCIUM SERPL-MCNC: 9.1 MG/DL
CHLORIDE SERPL-SCNC: 108 MMOL/L
CO2 SERPL-SCNC: 22 MMOL/L
CREAT SERPL-MCNC: 0.9 MG/DL
DIFFERENTIAL METHOD: ABNORMAL
EOSINOPHIL # BLD AUTO: 0.1 K/UL
EOSINOPHIL NFR BLD: 3.9 %
ERYTHROCYTE [DISTWIDTH] IN BLOOD BY AUTOMATED COUNT: 15.2 %
EST. GFR  (AFRICAN AMERICAN): >60 ML/MIN/1.73 M^2
EST. GFR  (NON AFRICAN AMERICAN): >60 ML/MIN/1.73 M^2
GLUCOSE SERPL-MCNC: 92 MG/DL
HCT VFR BLD AUTO: 34 %
HGB BLD-MCNC: 11.3 G/DL
LYMPHOCYTES # BLD AUTO: 0.8 K/UL
LYMPHOCYTES NFR BLD: 22.9 %
MAGNESIUM SERPL-MCNC: 1.7 MG/DL
MCH RBC QN AUTO: 28.7 PG
MCHC RBC AUTO-ENTMCNC: 33.2 G/DL
MCV RBC AUTO: 86 FL
MONOCYTES # BLD AUTO: 0.5 K/UL
MONOCYTES NFR BLD: 13.8 %
NEUTROPHILS # BLD AUTO: 2.1 K/UL
NEUTROPHILS NFR BLD: 58 %
PLATELET # BLD AUTO: 162 K/UL
PMV BLD AUTO: 10.4 FL
POTASSIUM SERPL-SCNC: 4.8 MMOL/L
PROT SERPL-MCNC: 6.8 G/DL
RBC # BLD AUTO: 3.94 M/UL
SODIUM SERPL-SCNC: 140 MMOL/L
TACROLIMUS BLD-MCNC: 4.3 NG/ML
WBC # BLD AUTO: 3.62 K/UL

## 2017-08-08 PROCEDURE — 20600001 HC STEP DOWN PRIVATE ROOM

## 2017-08-08 PROCEDURE — 36415 COLL VENOUS BLD VENIPUNCTURE: CPT

## 2017-08-08 PROCEDURE — 63600175 PHARM REV CODE 636 W HCPCS: Performed by: PHYSICIAN ASSISTANT

## 2017-08-08 PROCEDURE — 86833 HLA CLASS II HIGH DEFIN QUAL: CPT | Mod: 91,TXP

## 2017-08-08 PROCEDURE — 25000003 PHARM REV CODE 250: Performed by: PHYSICIAN ASSISTANT

## 2017-08-08 PROCEDURE — 85025 COMPLETE CBC W/AUTO DIFF WBC: CPT

## 2017-08-08 PROCEDURE — 80053 COMPREHEN METABOLIC PANEL: CPT

## 2017-08-08 PROCEDURE — 83735 ASSAY OF MAGNESIUM: CPT

## 2017-08-08 PROCEDURE — 99255 IP/OBS CONSLTJ NEW/EST HI 80: CPT | Mod: ,,, | Performed by: INTERNAL MEDICINE

## 2017-08-08 PROCEDURE — 86832 HLA CLASS I HIGH DEFIN QUAL: CPT | Mod: TXP

## 2017-08-08 PROCEDURE — 86977 RBC SERUM PRETX INCUBJ/INHIB: CPT | Mod: TXP

## 2017-08-08 PROCEDURE — 80197 ASSAY OF TACROLIMUS: CPT

## 2017-08-08 PROCEDURE — 99232 SBSQ HOSP IP/OBS MODERATE 35: CPT | Mod: ,,, | Performed by: INTERNAL MEDICINE

## 2017-08-08 RX ORDER — VALACYCLOVIR HYDROCHLORIDE 500 MG/1
2000 TABLET, FILM COATED ORAL 2 TIMES DAILY
Status: COMPLETED | OUTPATIENT
Start: 2017-08-08 | End: 2017-08-09

## 2017-08-08 RX ADMIN — PIPERACILLIN AND TAZOBACTAM 4.5 G: 4; .5 INJECTION, POWDER, LYOPHILIZED, FOR SOLUTION INTRAVENOUS; PARENTERAL at 10:08

## 2017-08-08 RX ADMIN — PIPERACILLIN AND TAZOBACTAM 4.5 G: 4; .5 INJECTION, POWDER, LYOPHILIZED, FOR SOLUTION INTRAVENOUS; PARENTERAL at 03:08

## 2017-08-08 RX ADMIN — EVEROLIMUS 1 MG: 0.5 TABLET ORAL at 08:08

## 2017-08-08 RX ADMIN — MYCOPHENOLATE MOFETIL 1000 MG: 250 CAPSULE ORAL at 09:08

## 2017-08-08 RX ADMIN — LEVOTHYROXINE SODIUM 75 MCG: 75 TABLET ORAL at 06:08

## 2017-08-08 RX ADMIN — TACROLIMUS 1 MG: 1 CAPSULE ORAL at 05:08

## 2017-08-08 RX ADMIN — TACROLIMUS 1 MG: 1 CAPSULE ORAL at 08:08

## 2017-08-08 RX ADMIN — THERA TABS 1 TABLET: TAB at 08:08

## 2017-08-08 RX ADMIN — MYCOPHENOLATE MOFETIL 1000 MG: 250 CAPSULE ORAL at 08:08

## 2017-08-08 RX ADMIN — ASPIRIN 81 MG: 81 TABLET, COATED ORAL at 08:08

## 2017-08-08 RX ADMIN — AMPICILLIN SODIUM AND SULBACTAM SODIUM 3 G: 2; 1 INJECTION, POWDER, FOR SOLUTION INTRAMUSCULAR; INTRAVENOUS at 05:08

## 2017-08-08 RX ADMIN — ACETAMINOPHEN 650 MG: 325 TABLET ORAL at 03:08

## 2017-08-08 RX ADMIN — ATORVASTATIN CALCIUM 40 MG: 20 TABLET, FILM COATED ORAL at 08:08

## 2017-08-08 RX ADMIN — LISINOPRIL 2.5 MG: 2.5 TABLET ORAL at 08:08

## 2017-08-08 RX ADMIN — EVEROLIMUS 1 MG: 0.5 TABLET ORAL at 09:08

## 2017-08-08 RX ADMIN — SULFAMETHOXAZOLE AND TRIMETHOPRIM 1 TABLET: 400; 80 TABLET ORAL at 08:08

## 2017-08-08 NOTE — ASSESSMENT & PLAN NOTE
61 y/o woman w/a history of peripartum NICM (dx ~2000, c/b MV disease s/p MV repair and subsequent mMVR in 2007; ultimately underwent OHTx 3/25/2016, CMV D+/R-, thymo induction, on maintenance everolimus/tacrolimus/MMF) admitted on 8/7/17 with left sided facial swelling and erythema after her puppy scratched her on 8/3/17. Patient has improved on empiric piperacillin/tazobactam + vancomycin. No evidence of deep tissue infection on imaging, will treat as cellulitis.    - CT scan sinus: Minimal paranasal sinus disease as discussed above. Rightward deviation of the nasal septum with associated bony nasal spur. No evidence of air-fluid level to suggest acute sinusitis.  - B/C NGTD  - would discontinue vancomycin + piperacillin/tazobactam  - would start ampicillin-sulbactam 3g IV q 6 hours   - if continues clinical improvement would switch to Augmentin

## 2017-08-08 NOTE — MEDICAL/APP STUDENT
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 Pt was scratched by 3 mo puppy on 8/3. Puppy is indoor/outdoor and in the process of receiving all immunizations. Pt cleaned wound with hydrogen peroxide and applied neosporin. Pt had no symptoms on 8/4 except for a small scratch. On Saturday pt awoke to erythema, swelling, tenseness surrounding the area of the scratch on the left side of her face. She denies any pain with eye movement, rhinorrhea, nasal discharge, ear discharge, ear pain, change in vision at this time. The swelling, redness and tenderness progressed on Sunday and the pt was advised to increase her bactrim 2 tablet PO BID from 1 tablet QDAY. By Monday there was no improvement in symptoms and pt was seen in clinic and advised to come to the ED for admission. Since being admitted pt has noticed improvement in pain, swelling, redness with IV abx.     PMHX: as above   Meds:   Current Outpatient Prescriptions on File Prior to Encounter   Medication Sig Dispense Refill    aspirin (ECOTRIN) 81 MG EC tablet Take 1 tablet (81 mg total) by mouth once daily. 30 tablet 11    atorvastatin (LIPITOR) 40 MG tablet Take 1 tablet (40 mg total) by mouth once daily. 30 tablet 11    everolimus (ZORTRESS) 0.5 mg tablet Take 2 tablets (1 mg total) by mouth 2 (two) times daily. 360 tablet 3    levothyroxine (SYNTHROID) 75 MCG tablet Take 1 tablet (75 mcg total) by mouth before breakfast. 20 tablet 11    lisinopril (PRINIVIL,ZESTRIL) 5 MG tablet Take 0.5 tablets (2.5 mg total) by mouth once daily. 90 tablet 3     multivitamin (THERAGRAN) tablet Take 1 tablet by mouth once daily.      mycophenolate (CELLCEPT) 250 mg Cap Take 4 capsules (1,000 mg total) by mouth 2 (two) times daily. 240 capsule 11    sulfamethoxazole-trimethoprim 400-80mg (BACTRIM,SEPTRA) 400-80 mg per tablet Take 1 tablet by mouth once daily. Z94.1 heart replaced by transplant (Patient taking differently: Take 2 tablets by mouth 2 (two) times daily. Z94.1 heart replaced by transplant) 30 tablet 11    magnesium oxide (MAG-OX) 400 mg tablet Take 1 tablet (400 mg total) by mouth 2 (two) times daily. (Patient not taking: Reported on 2017) 60 tablet 11    tacrolimus (PROGRAF) 1 MG Cap 1 Mg in the Am and 2 mg in the Pm by mouth (Patient taking differently: 1 Mg in the Am and 1 mg in the Pm by mouth) 240 capsule 11   NKDA  FaHX: Mother and sister with peripartum DCMP. Father  2/ SCC CA. Multiple siblings with HTN  SoHX: No TAD. Pt lives at home with her son and . She is not sexually active. 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     ROS:   Gen: No f/c.   HEENT: Increased tearing, erythema, swelling, pain, warmth left cheek. No changes in vision, eye discharge, changes in hearing, tinnitus, nasal discharge, difficulty swallowing, odynophagia. Pt has apthous ulcer similar to past herpes infx on the right lip.   ROS otherwise negative including no n/v, no f/c, no diarrhea/constipation. Pt is urinating without complaints and is able to tolerate a diet.     Objective  Vitals  T 98.3, HR , -154/73-91, ANNE  Vitals:    17 0346 17 0400 17 0700 17 0745   BP:  (!) 132/91  121/76   BP Location:  Left arm  Right arm   Patient Position:  Lying  Sitting   BP Method:  Automatic  Automatic   Pulse:  87 92 92   Resp:  20  18   Temp:  97.8 °F (36.6 °C)  98 °F (36.7 °C)   TempSrc:  Oral  Oral   SpO2:  96%  96%   Weight: 56.8 kg (125 lb 3.5 oz)      Height:         Physical Exam:  Gen: NAD, lying in bed.   HEENT:  EOMI, PERRLA, MMM, no pharyngeal exudates or erythema. No cervical LAD. Pt has swelling, erythema, warmth and TTP over the maxillary sinus on the left side of the face. No nasal, eye discharge. Pt able to fully open mouth. Neck has full ROM. Pt has apthous ulcer on the inside of the lip on the right side.  CV: RRR no murmurs. Radial 2+. No peripheral edema   Pulm: CTA b/l. No rales, ronchi or wheezes. No increased WOB  Abd: NTND. +BS  : deferred   Ext: No peripheral edema appreciated.  Neuro: No neurologic or CN deficit appreciated.     Labs  Recent Results (from the past 24 hour(s))   Brain natriuretic peptide    Collection Time: 08/07/17 10:43 AM   Result Value Ref Range    BNP 86 0 - 99 pg/mL   CBC auto differential    Collection Time: 08/07/17 10:43 AM   Result Value Ref Range    WBC 4.15 3.90 - 12.70 K/uL    RBC 4.17 4.00 - 5.40 M/uL    Hemoglobin 11.8 (L) 12.0 - 16.0 g/dL    Hematocrit 36.3 (L) 37.0 - 48.5 %    MCV 87 82 - 98 fL    MCH 28.3 27.0 - 31.0 pg    MCHC 32.5 32.0 - 36.0 g/dL    RDW 15.3 (H) 11.5 - 14.5 %    Platelets 171 150 - 350 K/uL    MPV 10.2 9.2 - 12.9 fL    Gran # 2.7 1.8 - 7.7 K/uL    Lymph # 1.0 1.0 - 4.8 K/uL    Mono # 0.4 0.3 - 1.0 K/uL    Eos # 0.1 0.0 - 0.5 K/uL    Baso # 0.02 0.00 - 0.20 K/uL    Gran% 63.9 38.0 - 73.0 %    Lymph% 24.3 18.0 - 48.0 %    Mono% 9.4 4.0 - 15.0 %    Eosinophil% 1.7 0.0 - 8.0 %    Basophil% 0.5 0.0 - 1.9 %    Differential Method Automated    Comprehensive metabolic panel    Collection Time: 08/07/17 10:43 AM   Result Value Ref Range    Sodium 140 136 - 145 mmol/L    Potassium 4.9 3.5 - 5.1 mmol/L    Chloride 108 95 - 110 mmol/L    CO2 25 23 - 29 mmol/L    Glucose 81 70 - 110 mg/dL    BUN, Bld 19 6 - 20 mg/dL    Creatinine 0.9 0.5 - 1.4 mg/dL    Calcium 9.8 8.7 - 10.5 mg/dL    Total Protein 7.5 6.0 - 8.4 g/dL    Albumin 4.1 3.5 - 5.2 g/dL    Total Bilirubin 0.6 0.1 - 1.0 mg/dL    Alkaline Phosphatase 86 55 - 135 U/L    AST 35 10 - 40 U/L    ALT 24 10 - 44 U/L     Anion Gap 7 (L) 8 - 16 mmol/L    eGFR if African American >60.0 >60 mL/min/1.73 m^2    eGFR if non African American >60.0 >60 mL/min/1.73 m^2   Tacrolimus level    Collection Time: 08/07/17 10:43 AM   Result Value Ref Range    Tacrolimus Lvl 4.3 (L) 5.0 - 15.0 ng/mL   Lipid panel    Collection Time: 08/07/17 10:43 AM   Result Value Ref Range    Cholesterol 165 120 - 199 mg/dL    Triglycerides 72 30 - 150 mg/dL    HDL 78 (H) 40 - 75 mg/dL    LDL Cholesterol 72.6 63.0 - 159.0 mg/dL    HDL/Chol Ratio 47.3 20.0 - 50.0 %    Total Cholesterol/HDL Ratio 2.1 2.0 - 5.0    Non-HDL Cholesterol 87 mg/dL   Magnesium    Collection Time: 08/07/17 10:43 AM   Result Value Ref Range    Magnesium 1.8 1.6 - 2.6 mg/dL   Blood culture    Collection Time: 08/07/17  4:51 PM   Result Value Ref Range    Blood Culture, Routine No Growth to date    Comprehensive metabolic panel    Collection Time: 08/08/17  5:11 AM   Result Value Ref Range    Sodium 140 136 - 145 mmol/L    Potassium 4.8 3.5 - 5.1 mmol/L    Chloride 108 95 - 110 mmol/L    CO2 22 (L) 23 - 29 mmol/L    Glucose 92 70 - 110 mg/dL    BUN, Bld 16 6 - 20 mg/dL    Creatinine 0.9 0.5 - 1.4 mg/dL    Calcium 9.1 8.7 - 10.5 mg/dL    Total Protein 6.8 6.0 - 8.4 g/dL    Albumin 3.6 3.5 - 5.2 g/dL    Total Bilirubin 0.4 0.1 - 1.0 mg/dL    Alkaline Phosphatase 75 55 - 135 U/L    AST 33 10 - 40 U/L    ALT 22 10 - 44 U/L    Anion Gap 10 8 - 16 mmol/L    eGFR if African American >60.0 >60 mL/min/1.73 m^2    eGFR if non African American >60.0 >60 mL/min/1.73 m^2   Magnesium    Collection Time: 08/08/17  5:11 AM   Result Value Ref Range    Magnesium 1.7 1.6 - 2.6 mg/dL   CBC auto differential    Collection Time: 08/08/17  5:12 AM   Result Value Ref Range    WBC 3.62 (L) 3.90 - 12.70 K/uL    RBC 3.94 (L) 4.00 - 5.40 M/uL    Hemoglobin 11.3 (L) 12.0 - 16.0 g/dL    Hematocrit 34.0 (L) 37.0 - 48.5 %    MCV 86 82 - 98 fL    MCH 28.7 27.0 - 31.0 pg    MCHC 33.2 32.0 - 36.0 g/dL    RDW 15.2 (H) 11.5 -  14.5 %    Platelets 162 150 - 350 K/uL    MPV 10.4 9.2 - 12.9 fL    Gran # 2.1 1.8 - 7.7 K/uL    Lymph # 0.8 (L) 1.0 - 4.8 K/uL    Mono # 0.5 0.3 - 1.0 K/uL    Eos # 0.1 0.0 - 0.5 K/uL    Baso # 0.03 0.00 - 0.20 K/uL    Gran% 58.0 38.0 - 73.0 %    Lymph% 22.9 18.0 - 48.0 %    Mono% 13.8 4.0 - 15.0 %    Eosinophil% 3.9 0.0 - 8.0 %    Basophil% 0.8 0.0 - 1.9 %    Differential Method Automated    Tacrolimus level    Collection Time: 08/08/17  5:12 AM   Result Value Ref Range    Tacrolimus Lvl 4.3 (L) 5.0 - 15.0 ng/mL       Meds    Current Facility-Administered Medications:     acetaminophen tablet 650 mg, 650 mg, Oral, Q4H PRN, Mindy Sosa Rolling, PA-C, 650 mg at 08/08/17 0345    aspirin EC tablet 81 mg, 81 mg, Oral, Daily, Mindy Sosa Rolling, PA-C, 81 mg at 08/08/17 0802    atorvastatin tablet 40 mg, 40 mg, Oral, Daily, Mindy Sosa Rolling, PA-C, 40 mg at 08/08/17 0802    everolimus tablet 1 mg, 1 mg, Oral, BID, Mindy Sosa Rolling, PA-C, 1 mg at 08/08/17 0803    levothyroxine tablet 75 mcg, 75 mcg, Oral, Before breakfast, Mindy Sosa Rolling, PA-C, 75 mcg at 08/08/17 0607    lisinopril tablet 2.5 mg, 2.5 mg, Oral, Daily, Mindy Sosa Rolling, PA-C, 2.5 mg at 08/08/17 0802    multivitamin tablet 1 tablet, 1 tablet, Oral, Daily, Mindy Sosa Rolling, PA-C, 1 tablet at 08/08/17 0802    mycophenolate capsule 1,000 mg, 1,000 mg, Oral, BID, Mindy Sosa Rolling, PA-C, 1,000 mg at 08/08/17 0802    piperacillin-tazobactam 4.5 g in dextrose 5 % 100 mL IVPB (ready to mix system), 4.5 g, Intravenous, Q8H, Mindy Mast PA-C, Last Rate: 25 mL/hr at 08/08/17 0345, 4.5 g at 08/08/17 0345    sulfamethoxazole-trimethoprim 400-80mg per tablet 1 tablet, 1 tablet, Oral, Daily, Mindy Mast PA-C, 1 tablet at 08/08/17 0802    tacrolimus capsule 1 mg, 1 mg, Oral, BID, Mindy Mast PA-C, 1 mg at 08/08/17 0802    vancomycin 1 g in dextrose 5 % 250 mL IVPB (ready to mix system), 1,000  mg, Intravenous, Q24H, Mindy Mast PA-C, Last Rate: 166.7 mL/hr at 08/07/17 1721, 1,000 mg at 08/07/17 1721    Micro:  Microbiology Results (last 7 days)     Procedure Component Value Units Date/Time    Blood culture [[id number]] Collected:  08/07/17 1651    Order Status:  Completed Specimen:  Blood Updated:  08/08/17 0145     Blood Culture, Routine No Growth to date        Rads:  CT Sinus (8/7):  Findings:    Bilateral frontal sinuses and frontoethmoid recesses are clear.  There is minimal mucosal thickening of the bilateral anterior ethmoid air cells.  The bilateral sphenoid sinuses are patent. There is minimal mucosal thickening of the right sphenoethmoidal recess. The left sphenoethmoidal recess is patent.    The maxillary antra are clear bilaterally.  The ostiomeatal units are patent bilaterally.    No evidence for nasal cavity mass. There is deviation of the nasal septum towards the right. There is a small 3-mm rightward oriented bony nasal spur.    The roof of the ethmoids is relatively symmetric. The lamina papyracea are grossly intact bilaterally.   Impression:         *  Minimal paranasal sinus disease as discussed above. Rightward deviation of the nasal septum with associated bony nasal spur. No evidence of air-fluid level to suggest acute sinusitis.     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    - CT sinus not concerning for any deep seeded infection   - Symptoms improved with IV abx per pt and based on photo from time of admit   - Continue IV abx coverage with zosyn, vanc and bactrim at this time   - Consider switch to PO augmentin pending continued  clinical improvement.   - BCx continue to be NG and will continue to follow.   - Consider addition of IV acyclovir given sentinel ulcer formation in a pt with h/o HSV infx requiring anti-viral therapy.       Bernard Landry-Wegener, L4  [facility] [facility] [employer]   08/08/2017

## 2017-08-08 NOTE — PROGRESS NOTES
[facility]  Heart Transplant  Progress Note    Patient Name: Cassandra Gonzales  MRN: [id number]  Admission Date: 8/7/2017  Hospital Length of Stay: 1 days  Attending Physician: Adal Grimes MD  Primary Care Provider: Esha Tee MD  Principal Problem:Cellulitis, face    Subjective:     Interval History: Patient without complaints this morning. Facial swelling stable.     Continuous Infusions:   Scheduled Meds:   ampicillin-sulbactim (UNASYN) IVPB  3 g Intravenous Q6H    aspirin  81 mg Oral Daily    atorvastatin  40 mg Oral Daily    everolimus  1 mg Oral BID    levothyroxine  75 mcg Oral Before breakfast    lisinopril  2.5 mg Oral Daily    multivitamin  1 tablet Oral Daily    mycophenolate  1,000 mg Oral BID    sulfamethoxazole-trimethoprim 400-80mg  1 tablet Oral Daily    tacrolimus  1 mg Oral BID     PRN Meds:acetaminophen    Review of patient's allergies indicates:   Allergen Reactions    Rapamune [sirolimus]      Rash bilat arms      Objective:     Vital Signs (Most Recent):  Temp: 97.9 °F (36.6 °C) (08/08/17 1600)  Pulse: 89 (08/08/17 1600)  Resp: 18 (08/08/17 1600)  BP: 118/77 (08/08/17 1600)  SpO2: 100 % (08/08/17 1600) Vital Signs (24h Range):  Temp:  [97.8 °F (36.6 °C)-98 °F (36.7 °C)] 97.9 °F (36.6 °C)  Pulse:  [] 89  Resp:  [18-20] 18  SpO2:  [95 %-100 %] 100 %  BP: (115-132)/(73-91) 118/77     Weight: 56.8 kg (125 lb 3.5 oz)  Body mass index is 22.18 kg/m².      Intake/Output Summary (Last 24 hours) at 08/08/17 1635  Last data filed at 08/08/17 1600   Gross per 24 hour   Intake             2250 ml   Output             3950 ml   Net            -1700 ml       Hemodynamic Parameters:           Physical Exam   Constitutional: She is oriented to person, place, and time. She appears well-developed and well-nourished.   HENT:   Head: Normocephalic and atraumatic.       Neck: Normal range of motion. Neck supple. No JVD present.   Cardiovascular: S1 normal,  S2 normal and normal heart sounds.  Tachycardia present.    Pulmonary/Chest: Effort normal and breath sounds normal. No respiratory distress. She has no wheezes. She has no rales.   Abdominal: Soft. Bowel sounds are normal. She exhibits no distension. There is no tenderness. There is no guarding.   Musculoskeletal: Normal range of motion. She exhibits no edema.   Neurological: She is alert and oriented to person, place, and time.   Skin: Skin is warm and dry. Capillary refill takes 2 to 3 seconds.   Nursing note and vitals reviewed.      Significant Labs:  CBC:    Recent Labs  Lab 08/08/17  0512   WBC 3.62*   RBC 3.94*   HGB 11.3*   HCT 34.0*      MCV 86   MCH 28.7   MCHC 33.2     BNP:    Recent Labs  Lab 08/07/17  1043   BNP 86     CMP:    Recent Labs  Lab 08/08/17  0511   GLU 92   CALCIUM 9.1   ALBUMIN 3.6   PROT 6.8      K 4.8   CO2 22*      BUN 16   CREATININE 0.9   ALKPHOS 75   ALT 22   AST 33   BILITOT 0.4      Coagulation:   No results for input(s): INR, APTT in the last 72 hours.    Invalid input(s): PT  LDH:  No results for input(s): LDH in the last 72 hours.  Microbiology:  Microbiology Results (last 7 days)     Procedure Component Value Units Date/Time    Blood culture [[id number]] Collected:  08/07/17 1651    Order Status:  Completed Specimen:  Blood Updated:  08/08/17 0145     Blood Culture, Routine No Growth to date          I have reviewed all pertinent labs within the past 24 hours.    Estimated Creatinine Clearance: 55 mL/min (based on Cr of 0.9).    Diagnostic Results:  I have reviewed and interpreted all pertinent imaging results/findings within the past 24 hours.    Assessment and Plan:     Left facial swelling    -No F/C or leukocytosis  -Spoke with Transplant ID: Start Vanc, Zosyn; get CT max facial; get blood cultures; then will see pt and follow with us        Cardiac allograft vasculopathy    -Cont ASA, statin, tacro/everolimus        Heart transplanted    -h/o cellular  rejection and CAV  -Cont tacro, everolimus and follow levels. Cont MMF for now          Hypothyroidism (acquired)    -Cont Synthroid        * Cellulitis, face    -Vanc and zosyn on board  -Await transplant ID recs            Mae Leonard PA-C  Heart Transplant  [facility]

## 2017-08-08 NOTE — SUBJECTIVE & OBJECTIVE
Past Medical History:   Diagnosis Date    Anticoagulant long-term use     Cardiomyopathy, dilated, nonischemic 2015    CHF (congestive heart failure)     CHF (NYHA class III, ACC/AHA stage C) 2015    Encounter for blood transfusion     Hypertension     Hypothyroidism (acquired)     Peripartum cardiomyopathy 2015    Stroke     TIA in 2014       Past Surgical History:   Procedure Laterality Date    CARDIAC DEFIBRILLATOR PLACEMENT      CARDIAC DEFIBRILLATOR REMOVAL  2016    CARDIAC VALVE REPLACEMENT       SECTION      heart transplanted  2016    right breast tumor         Review of patient's allergies indicates:   Allergen Reactions    Rapamune [sirolimus]      Rash bilat arms        Medications:  Prescriptions Prior to Admission   Medication Sig    aspirin (ECOTRIN) 81 MG EC tablet Take 1 tablet (81 mg total) by mouth once daily.    atorvastatin (LIPITOR) 40 MG tablet Take 1 tablet (40 mg total) by mouth once daily.    everolimus (ZORTRESS) 0.5 mg tablet Take 2 tablets (1 mg total) by mouth 2 (two) times daily.    levothyroxine (SYNTHROID) 75 MCG tablet Take 1 tablet (75 mcg total) by mouth before breakfast.    lisinopril (PRINIVIL,ZESTRIL) 5 MG tablet Take 0.5 tablets (2.5 mg total) by mouth once daily.    multivitamin (THERAGRAN) tablet Take 1 tablet by mouth once daily.    mycophenolate (CELLCEPT) 250 mg Cap Take 4 capsules (1,000 mg total) by mouth 2 (two) times daily.    sulfamethoxazole-trimethoprim 400-80mg (BACTRIM,SEPTRA) 400-80 mg per tablet Take 1 tablet by mouth once daily. Z94.1 heart replaced by transplant (Patient taking differently: Take 2 tablets by mouth 2 (two) times daily. Z94.1 heart replaced by transplant)    magnesium oxide (MAG-OX) 400 mg tablet Take 1 tablet (400 mg total) by mouth 2 (two) times daily. (Patient not taking: Reported on 2017)    tacrolimus (PROGRAF) 1 MG Cap 1 Mg in the Am and 2 mg in the Pm by mouth (Patient  taking differently: 1 Mg in the Am and 1 mg in the Pm by mouth)     Antibiotics     Start     Stop Route Frequency Ordered    08/08/17 0900  sulfamethoxazole-trimethoprim 400-80mg per tablet 1 tablet      -- Oral Daily 08/07/17 1552    08/07/17 1730  piperacillin-tazobactam 4.5 g in dextrose 5 % 100 mL IVPB (ready to mix system)      -- IV Every 8 hours (non-standard times) 08/07/17 1618    08/07/17 1700  vancomycin 1 g in dextrose 5 % 250 mL IVPB (ready to mix system)  (Vancomycin IVPB with levels panel)      -- IV Every 24 hours (non-standard times) 08/07/17 1653        Antifungals     None        Antivirals     None           Immunization History   Administered Date(s) Administered    Hepatitis A, Pediatric/Adolescent, 2 Dose 07/19/2015    Hepatitis B, Adult 07/19/2015, 08/13/2015    Pneumococcal Conjugate - 13 Valent 07/19/2015    Tdap 07/19/2015       Family History     Problem Relation (Age of Onset)    Cancer Father    Heart disease Mother    Hypertension Mother    No Known Problems Sister, Brother, Maternal Aunt, Maternal Uncle, Paternal Aunt, Paternal Uncle, Maternal Grandmother, Maternal Grandfather, Paternal Grandmother, Paternal Grandfather        Social History     Social History    Marital status:      Spouse name: N/A    Number of children: N/A    Years of education: N/A     Social History Main Topics    Smoking status: Never Smoker    Smokeless tobacco: Never Used    Alcohol use No    Drug use: No    Sexual activity: Not Asked     Other Topics Concern    None     Social History Narrative    None     Review of Systems   Constitutional: Negative for chills and fever.   HENT: Positive for facial swelling (left side) and mouth sores. Negative for ear discharge, ear pain, hearing loss, sinus pressure, sore throat and trouble swallowing.    Eyes: Negative for photophobia, pain, discharge, redness, itching and visual disturbance.   Respiratory: Negative for cough, chest tightness and  shortness of breath.    Cardiovascular: Negative for chest pain, palpitations and leg swelling.   Gastrointestinal: Negative for abdominal distention, abdominal pain, diarrhea, nausea and vomiting.   Genitourinary: Negative for dysuria, flank pain and urgency.   Skin: Negative for rash.   Neurological: Negative for dizziness, light-headedness and numbness.   Psychiatric/Behavioral: Negative for confusion.     Objective:     Vital Signs (Most Recent):  Temp: 97.8 °F (36.6 °C) (08/08/17 1100)  Pulse: 96 (08/08/17 1114)  Resp: 18 (08/08/17 1100)  BP: 127/80 (08/08/17 1100)  SpO2: 98 % (08/08/17 1100) Vital Signs (24h Range):  Temp:  [97.8 °F (36.6 °C)-98 °F (36.7 °C)] 97.8 °F (36.6 °C)  Pulse:  [] 96  Resp:  [18-20] 18  SpO2:  [95 %-98 %] 98 %  BP: (115-132)/(73-91) 127/80     Weight: 56.8 kg (125 lb 3.5 oz)  Body mass index is 22.18 kg/m².    Estimated Creatinine Clearance: 55 mL/min (based on Cr of 0.9).    Physical Exam   Constitutional: She is oriented to person, place, and time. No distress.       HENT:   Head: Normocephalic.   Mouth/Throat: No oropharyngeal exudate.   Eyes: No scleral icterus.   Cardiovascular: Normal rate and regular rhythm.  Exam reveals no gallop and no friction rub.    No murmur heard.  Pulmonary/Chest: Effort normal. No respiratory distress. She has no wheezes. She has no rales.   Abdominal: Soft. Bowel sounds are normal. She exhibits no distension. There is no tenderness. There is no rebound and no guarding.   Musculoskeletal: Normal range of motion.   Lymphadenopathy:     She has no cervical adenopathy.   Neurological: She is alert and oriented to person, place, and time. No cranial nerve deficit or sensory deficit. GCS eye subscore is 4. GCS verbal subscore is 5. GCS motor subscore is 6.   Skin: She is not diaphoretic.   Vitals reviewed.      Significant Labs:   Blood Culture:   Recent Labs  Lab 08/07/17  1651   LABBLOO No Growth to date     BMP:   Recent Labs  Lab 08/08/17  0511    GLU 92      K 4.8      CO2 22*   BUN 16   CREATININE 0.9   CALCIUM 9.1   MG 1.7     CBC:   Recent Labs  Lab 08/07/17  1043 08/08/17  0512   WBC 4.15 3.62*   HGB 11.8* 11.3*   HCT 36.3* 34.0*    162     CMP:   Recent Labs  Lab 08/07/17  1043 08/08/17  0511    140   K 4.9 4.8    108   CO2 25 22*   GLU 81 92   BUN 19 16   CREATININE 0.9 0.9   CALCIUM 9.8 9.1   PROT 7.5 6.8   ALBUMIN 4.1 3.6   BILITOT 0.6 0.4   ALKPHOS 86 75   AST 35 33   ALT 24 22   ANIONGAP 7* 10   EGFRNONAA >60.0 >60.0     Microbiology Results (last 7 days)     Procedure Component Value Units Date/Time    Blood culture [[id number]] Collected:  08/07/17 1651    Order Status:  Completed Specimen:  Blood Updated:  08/08/17 0145     Blood Culture, Routine No Growth to date          Significant Imaging: I have reviewed all pertinent imaging results/findings within the past 24 hours.

## 2017-08-08 NOTE — CONSULTS
[facility]  Infectious Disease  Consult Note    Patient Name: Cassandra Gonzales  MRN: [id number]  Admission Date: 8/7/2017  Hospital Length of Stay: 1 days  Attending Physician: Adal Grimes MD  Primary Care Provider: Esha Tee MD     Isolation Status: No active isolations    Patient information was obtained from patient, past medical records and ER records.      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 Patient has improved on empiric piperacillin/tazobactam + vancomycin. No evidence of deep tissue infection on imaging, will treat as cellulitis.    - CT scan sinus: Minimal paranasal sinus disease as discussed above. Rightward deviation of the nasal septum with associated bony nasal spur. No evidence of air-fluid level to suggest acute sinusitis.  - B/C NGTD  - would discontinue vancomycin + piperacillin/tazobactam  - would start ampicillin-sulbactam 3g IV q 6 hours   - if continues clinical improvement would switch to Augmentin              Thank you for your consult. I will follow-up with patient. Please contact us if you have any additional questions.    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 She first treated the area with hydrogen peroxide and topical antibiotics, but in the next 3 days she noticed worsening of the swelling, erythema and tender on left side of face. Puppy is 3 months old and up to date on vaccinations. She initially  was told to increase Bactrim to 2 pills BID, but despite this she continues to worsen. Denies any eye pain, headaches, ear pain, fever or chills.    Past Medical History:   Diagnosis Date    Anticoagulant long-term use     Cardiomyopathy, dilated, nonischemic 2015    CHF (congestive heart failure)     CHF (NYHA class III, ACC/AHA stage C) 2015    Encounter for blood transfusion     Hypertension     Hypothyroidism (acquired)     Peripartum cardiomyopathy 2015    Stroke     TIA in 2014       Past Surgical History:   Procedure Laterality Date    CARDIAC DEFIBRILLATOR PLACEMENT      CARDIAC DEFIBRILLATOR REMOVAL  2016    CARDIAC VALVE REPLACEMENT       SECTION      heart transplanted  2016    right breast tumor         Review of patient's allergies indicates:   Allergen Reactions    Rapamune [sirolimus]      Rash bilat arms        Medications:  Prescriptions Prior to Admission   Medication Sig    aspirin (ECOTRIN) 81 MG EC tablet Take 1 tablet (81 mg total) by mouth once daily.    atorvastatin (LIPITOR) 40 MG tablet Take 1 tablet (40 mg total) by mouth once daily.    everolimus (ZORTRESS) 0.5 mg tablet Take 2 tablets (1 mg total) by mouth 2 (two) times daily.    levothyroxine (SYNTHROID) 75 MCG tablet Take 1 tablet (75 mcg total) by mouth before breakfast.    lisinopril (PRINIVIL,ZESTRIL) 5 MG tablet Take 0.5 tablets (2.5 mg total) by mouth once daily.    multivitamin (THERAGRAN) tablet Take 1 tablet by mouth once daily.    mycophenolate (CELLCEPT) 250 mg Cap Take 4 capsules (1,000 mg total) by mouth 2 (two) times daily.    sulfamethoxazole-trimethoprim 400-80mg (BACTRIM,SEPTRA) 400-80 mg per tablet Take 1  tablet by mouth once daily. Z94.1 heart replaced by transplant (Patient taking differently: Take 2 tablets by mouth 2 (two) times daily. Z94.1 heart replaced by transplant)    magnesium oxide (MAG-OX) 400 mg tablet Take 1 tablet (400 mg total) by mouth 2 (two) times daily. (Patient not taking: Reported on 8/7/2017)    tacrolimus (PROGRAF) 1 MG Cap 1 Mg in the Am and 2 mg in the Pm by mouth (Patient taking differently: 1 Mg in the Am and 1 mg in the Pm by mouth)     Antibiotics     Start     Stop Route Frequency Ordered    08/08/17 0900  sulfamethoxazole-trimethoprim 400-80mg per tablet 1 tablet      -- Oral Daily 08/07/17 1552    08/07/17 1730  piperacillin-tazobactam 4.5 g in dextrose 5 % 100 mL IVPB (ready to mix system)      -- IV Every 8 hours (non-standard times) 08/07/17 1618    08/07/17 1700  vancomycin 1 g in dextrose 5 % 250 mL IVPB (ready to mix system)  (Vancomycin IVPB with levels panel)      -- IV Every 24 hours (non-standard times) 08/07/17 1653        Antifungals     None        Antivirals     None           Immunization History   Administered Date(s) Administered    Hepatitis A, Pediatric/Adolescent, 2 Dose 07/19/2015    Hepatitis B, Adult 07/19/2015, 08/13/2015    Pneumococcal Conjugate - 13 Valent 07/19/2015    Tdap 07/19/2015       Family History     Problem Relation (Age of Onset)    Cancer Father    Heart disease Mother    Hypertension Mother    No Known Problems Sister, Brother, Maternal Aunt, Maternal Uncle, Paternal Aunt, Paternal Uncle, Maternal Grandmother, Maternal Grandfather, Paternal Grandmother, Paternal Grandfather        Social History     Social History    Marital status:      Spouse name: N/A    Number of children: N/A    Years of education: N/A     Social History Main Topics    Smoking status: Never Smoker    Smokeless tobacco: Never Used    Alcohol use No    Drug use: No    Sexual activity: Not Asked     Other Topics Concern    None     Social History  Narrative    None     Review of Systems   Constitutional: Negative for chills and fever.   HENT: Positive for facial swelling (left side) and mouth sores. Negative for ear discharge, ear pain, hearing loss, sinus pressure, sore throat and trouble swallowing.    Eyes: Negative for photophobia, pain, discharge, redness, itching and visual disturbance.   Respiratory: Negative for cough, chest tightness and shortness of breath.    Cardiovascular: Negative for chest pain, palpitations and leg swelling.   Gastrointestinal: Negative for abdominal distention, abdominal pain, diarrhea, nausea and vomiting.   Genitourinary: Negative for dysuria, flank pain and urgency.   Skin: Negative for rash.   Neurological: Negative for dizziness, light-headedness and numbness.   Psychiatric/Behavioral: Negative for confusion.     Objective:     Vital Signs (Most Recent):  Temp: 97.8 °F (36.6 °C) (08/08/17 1100)  Pulse: 96 (08/08/17 1114)  Resp: 18 (08/08/17 1100)  BP: 127/80 (08/08/17 1100)  SpO2: 98 % (08/08/17 1100) Vital Signs (24h Range):  Temp:  [97.8 °F (36.6 °C)-98 °F (36.7 °C)] 97.8 °F (36.6 °C)  Pulse:  [] 96  Resp:  [18-20] 18  SpO2:  [95 %-98 %] 98 %  BP: (115-132)/(73-91) 127/80     Weight: 56.8 kg (125 lb 3.5 oz)  Body mass index is 22.18 kg/m².    Estimated Creatinine Clearance: 55 mL/min (based on Cr of 0.9).    Physical Exam   Constitutional: She is oriented to person, place, and time. No distress.       HENT:   Head: Normocephalic.   Mouth/Throat: No oropharyngeal exudate.   Eyes: No scleral icterus.   Cardiovascular: Normal rate and regular rhythm.  Exam reveals no gallop and no friction rub.    No murmur heard.  Pulmonary/Chest: Effort normal. No respiratory distress. She has no wheezes. She has no rales.   Abdominal: Soft. Bowel sounds are normal. She exhibits no distension. There is no tenderness. There is no rebound and no guarding.   Musculoskeletal: Normal range of motion.   Lymphadenopathy:     She has no  cervical adenopathy.   Neurological: She is alert and oriented to person, place, and time. No cranial nerve deficit or sensory deficit. GCS eye subscore is 4. GCS verbal subscore is 5. GCS motor subscore is 6.   Skin: She is not diaphoretic.   Vitals reviewed.      Significant Labs:   Blood Culture:   Recent Labs  Lab 08/07/17 1651   LABBLOO No Growth to date     BMP:   Recent Labs  Lab 08/08/17  0511   GLU 92      K 4.8      CO2 22*   BUN 16   CREATININE 0.9   CALCIUM 9.1   MG 1.7     CBC:   Recent Labs  Lab 08/07/17  1043 08/08/17  0512   WBC 4.15 3.62*   HGB 11.8* 11.3*   HCT 36.3* 34.0*    162     CMP:   Recent Labs  Lab 08/07/17  1043 08/08/17  0511    140   K 4.9 4.8    108   CO2 25 22*   GLU 81 92   BUN 19 16   CREATININE 0.9 0.9   CALCIUM 9.8 9.1   PROT 7.5 6.8   ALBUMIN 4.1 3.6   BILITOT 0.6 0.4   ALKPHOS 86 75   AST 35 33   ALT 24 22   ANIONGAP 7* 10   EGFRNONAA >60.0 >60.0     Microbiology Results (last 7 days)     Procedure Component Value Units Date/Time    Blood culture [[id number]] Collected:  08/07/17 1651    Order Status:  Completed Specimen:  Blood Updated:  08/08/17 0145     Blood Culture, Routine No Growth to date          Significant Imaging: I have reviewed all pertinent imaging results/findings within the past 24 hours.

## 2017-08-08 NOTE — SUBJECTIVE & OBJECTIVE
Interval History: Patient without complaints this morning. Facial swelling stable.     Continuous Infusions:   Scheduled Meds:   ampicillin-sulbactim (UNASYN) IVPB  3 g Intravenous Q6H    aspirin  81 mg Oral Daily    atorvastatin  40 mg Oral Daily    everolimus  1 mg Oral BID    levothyroxine  75 mcg Oral Before breakfast    lisinopril  2.5 mg Oral Daily    multivitamin  1 tablet Oral Daily    mycophenolate  1,000 mg Oral BID    sulfamethoxazole-trimethoprim 400-80mg  1 tablet Oral Daily    tacrolimus  1 mg Oral BID     PRN Meds:acetaminophen    Review of patient's allergies indicates:   Allergen Reactions    Rapamune [sirolimus]      Rash bilat arms      Objective:     Vital Signs (Most Recent):  Temp: 97.9 °F (36.6 °C) (08/08/17 1600)  Pulse: 89 (08/08/17 1600)  Resp: 18 (08/08/17 1600)  BP: 118/77 (08/08/17 1600)  SpO2: 100 % (08/08/17 1600) Vital Signs (24h Range):  Temp:  [97.8 °F (36.6 °C)-98 °F (36.7 °C)] 97.9 °F (36.6 °C)  Pulse:  [] 89  Resp:  [18-20] 18  SpO2:  [95 %-100 %] 100 %  BP: (115-132)/(73-91) 118/77     Weight: 56.8 kg (125 lb 3.5 oz)  Body mass index is 22.18 kg/m².      Intake/Output Summary (Last 24 hours) at 08/08/17 1635  Last data filed at 08/08/17 1600   Gross per 24 hour   Intake             2250 ml   Output             3950 ml   Net            -1700 ml       Hemodynamic Parameters:           Physical Exam   Constitutional: She is oriented to person, place, and time. She appears well-developed and well-nourished.   HENT:   Head: Normocephalic and atraumatic.       Neck: Normal range of motion. Neck supple. No JVD present.   Cardiovascular: S1 normal, S2 normal and normal heart sounds.  Tachycardia present.    Pulmonary/Chest: Effort normal and breath sounds normal. No respiratory distress. She has no wheezes. She has no rales.   Abdominal: Soft. Bowel sounds are normal. She exhibits no distension. There is no tenderness. There is no guarding.   Musculoskeletal: Normal  range of motion. She exhibits no edema.   Neurological: She is alert and oriented to person, place, and time.   Skin: Skin is warm and dry. Capillary refill takes 2 to 3 seconds.   Nursing note and vitals reviewed.      Significant Labs:  CBC:    Recent Labs  Lab 08/08/17  0512   WBC 3.62*   RBC 3.94*   HGB 11.3*   HCT 34.0*      MCV 86   MCH 28.7   MCHC 33.2     BNP:    Recent Labs  Lab 08/07/17  1043   BNP 86     CMP:    Recent Labs  Lab 08/08/17  0511   GLU 92   CALCIUM 9.1   ALBUMIN 3.6   PROT 6.8      K 4.8   CO2 22*      BUN 16   CREATININE 0.9   ALKPHOS 75   ALT 22   AST 33   BILITOT 0.4      Coagulation:   No results for input(s): INR, APTT in the last 72 hours.    Invalid input(s): PT  LDH:  No results for input(s): LDH in the last 72 hours.  Microbiology:  Microbiology Results (last 7 days)     Procedure Component Value Units Date/Time    Blood culture [[id number]] Collected:  08/07/17 1651    Order Status:  Completed Specimen:  Blood Updated:  08/08/17 0145     Blood Culture, Routine No Growth to date          I have reviewed all pertinent labs within the past 24 hours.    Estimated Creatinine Clearance: 55 mL/min (based on Cr of 0.9).    Diagnostic Results:  I have reviewed and interpreted all pertinent imaging results/findings within the past 24 hours.

## 2017-08-08 NOTE — CONSULTS
[facility]  Infectious Disease  Consult Note    Patient Name: Cassandra Gonzales  MRN: [id number]  Admission Date: 8/7/2017  Hospital Length of Stay: 1 days  Attending Physician: Adal Grimes MD  Primary Care Provider: Esha Tee MD     Isolation Status: No active isolations      Inpatient consult to Infectious Diseases  Consult performed by: SHERIDAN COX  Consult ordered by: YINKA SPARKS  Reason for consult: L sided facial swelling/erythema, s/p OHTx 3/2016        Consult received. Full note to follow.    Please call for questions.    Thanks,  Sheridan Terry, PGY -4  ID Fellow  Pager: [phone]

## 2017-08-08 NOTE — PROGRESS NOTES
Admit/Discharge Note     Met with patient to assess needs. Patient is a 62 y.o.  female, admitted for Left facial swelling [R22.0] per medical record. Pt is s/p heart transplant March 25, 2016. Pt's face was scratched by her puppy.    Patient admitted from home on 8/7/2017.  At this time, patient presents as alert and oriented x 4, calm, communicative, cooperative and asking and answering questions appropriately.      Household/Family Systems     Patient resides with patient's son ,Michael (19 y/o) at:     [address] [facility] [address].    Cell: [phone]  Macrina Rios (sister): [phone]  Manpreet Larry (brother): [phone]  Rosenda (friend): [phone]    Support system includes:sister, Macrina, brother, Manpreet, son, Michael, and friend, Rosenda.  Patient does not have dependents that are need of being cared for.     Patients primary caregiver is Rosenda, patients friend and Macrina, patient's sister.    During admission, patient's caregiver plans to stay at home.  Confirmed patient and patients caregivers do have access to reliable transportation.    Cognitive Status/Learning     Patient reports reading ability as 12th grade and states patient does not have difficulty with reading, writing, seeing, comprehension, learning and memory. Pt reports she wears contacts and has minor hearing loss in her left ear.  Patient reports patient learns best by visual and hands on.   Needed: No.   Highest education level: High School (9-12) or GED    Vocation/Disability     Working for Income: Yes  Pt is currently working by using the Ticket to Work program. Pt has been working at [employer] since Jan of 2017. Pt stated the Ticket to Work program provides continued benefits for 9 months and then a 3 month juanjo period. Pt states her disability income will end around Dec and then she will just have income from her full-time employment.    Adherence     Patient reports a high level of adherence to  patients health care regimen.  Adherence counseling and education provided.  Patient verbalizes understanding.    Substance Use    Patient reports the following substance usage.    Tobacco: none, patient denies any use.  Alcohol: none, patient denies any use.  Illicit Drugs/Non-prescribed Medications: none, patient denies any use.  Patient states clear understanding of the potential impact of substance use.  Substance abstinence/cessation counseling, education and resources provided and reviewed.     Services Utilizing/ADLS    Infusion Service: Prior to admission, patient utilizing? no  Home Health: Prior to admission, patient utilizing? no  DME: Prior to admission, no  Pulmonary/Cardiac Rehab: Prior to admission, no  Dialysis:  Prior to admission, no  Transplant Specialty Pharmacy:  Prior to admission, yes; [facility] .    Prior to admission, patient reports patient was independent with ADLS and was not driving.  Patient reports patient is able to care for self at this time.  Patient indicates a willingness to care for self once medically cleared to do so.    Insurance/Medications    Insured by   Payor/Plan Subscr  Sex Relation Sub. Ins. ID Effective Group Num   1. MEDICAID - [place]* 1955 Female  [id number]* 16                                    [address]   2. MEDICAID - [place]* 1955 Female  [id number]* 16                                    [address]      Primary Insurance (for UNOS reporting): Public Insurance - Medicaid Pt states she has coordination of benefits from [employer] from her employer.  Secondary Insurance (for UNOS reporting): None    Patient reports patient is able to obtain and afford medications at this time and at time of discharge.    Living Will/Healthcare Power of     Patient states patient has a LW and/or HCPA. Pt reports that her HCPA is her sister, Macrina   provided education regarding LW and HCPA and the completion of  forms.    Coping/Mental Health    Patient is coping adequately with the aid of  family members. Pt reports no feeling of depression or anxiety at this time and states her new job has been very challenging.    Discharge Planning    At time of discharge, patient plans to return to patient's home under the care of self.  Patient's friend Rosenda will transport patient. Provided pt education regarding using her Medicaid transport benefit. Per rounds today, expected discharge date has not been determined. Patient verbalizes understanding and are involved in treatment planning and discharge process.    Additional Concerns  Patient denies additional needs and/or concerns at this time. Patient verbalizes understanding and agreement with information reviewed, social work availability, and how to access available resources as needed. Patient is being followed for needs, education, resources, information, emotional support, supportive counseling, and for supportive and skilled discharge plan of care.  providing ongoing psychosocial support, education, resources and d/c planning as needed.  SW remains available.  provided resource list, patient choice, psychosocial and supportive counseling, resources, education, assistance and discharge planning with patient and caregiver involvement, ongoing SW availability and services as appropriate.

## 2017-08-08 NOTE — NURSING
Heart Transplant Coordinator Rounds Report: Attended interdisciplinary rounds this morning with the transplant team including SW, physicians, fellows,  mid-level providers, and transplant coordinators.  Discussed plan of care which includes rounding by ID team for the dog bite and cellulitis. No DC date yet.

## 2017-08-08 NOTE — PLAN OF CARE
Problem: Patient Care Overview  Goal: Plan of Care Review  Outcome: Ongoing (interventions implemented as appropriate)  Plan of care reviewed with patient. Pt verbalized understanding. Pt AAOX4. Pt free from falls, injuries and trauma.  Pt free from skin breakdown.  Pt VSS and in NAD.  Pt afebrile.  Pt had no complaints of SOB, N/V or CP. Pt had no complaints of pain this shift. Pt ambulated independently in room with non skid socks on. Adequate UOP this shift. No BM this shift. abx give per order. Ct done of the sinuses. Pt had uneventful evening. Pt in low locked bed with personal items and call light within reach. Fall precautions maintained.

## 2017-08-08 NOTE — PLAN OF CARE
Problem: Patient Care Overview  Goal: Plan of Care Review  Outcome: Ongoing (interventions implemented as appropriate)  Patient AAOx4 and VSS. She has remained afebrile. Telemetry monitor is on and HR has been in the 90s, NS. She's getting IV vanc and piperacillin for the scratch on the left side of her face secondary to puppy scratching her. The left side of her face is swollen and reddened but looks better than it did yesterday per patient. Patient is able to ambulate without assistance and has experienced no falls today. Her only question is when she might be discharged. Nurse unsure but will ask team. She's stable and will continue to monitor.

## 2017-08-09 PROBLEM — L03.211 CELLULITIS, FACE: Status: ACTIVE | Noted: 2017-08-09
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
## 2017-08-09 LAB
ALBUMIN SERPL BCP-MCNC: 3.7 G/DL
ALP SERPL-CCNC: 79 U/L
ALT SERPL W/O P-5'-P-CCNC: 25 U/L
ANION GAP SERPL CALC-SCNC: 12 MMOL/L
AST SERPL-CCNC: 36 U/L
BASOPHILS # BLD AUTO: 0.01 K/UL
BASOPHILS NFR BLD: 0.3 %
BILIRUB SERPL-MCNC: 0.4 MG/DL
BUN SERPL-MCNC: 18 MG/DL
CALCIUM SERPL-MCNC: 9.3 MG/DL
CHLORIDE SERPL-SCNC: 109 MMOL/L
CO2 SERPL-SCNC: 20 MMOL/L
CREAT SERPL-MCNC: 0.9 MG/DL
DIFFERENTIAL METHOD: ABNORMAL
EOSINOPHIL # BLD AUTO: 0.1 K/UL
EOSINOPHIL NFR BLD: 3.1 %
ERYTHROCYTE [DISTWIDTH] IN BLOOD BY AUTOMATED COUNT: 15.1 %
EST. GFR  (AFRICAN AMERICAN): >60 ML/MIN/1.73 M^2
EST. GFR  (NON AFRICAN AMERICAN): >60 ML/MIN/1.73 M^2
GLUCOSE SERPL-MCNC: 79 MG/DL
HCT VFR BLD AUTO: 37.7 %
HGB BLD-MCNC: 12.2 G/DL
LYMPHOCYTES # BLD AUTO: 0.8 K/UL
LYMPHOCYTES NFR BLD: 19.9 %
MAGNESIUM SERPL-MCNC: 1.8 MG/DL
MCH RBC QN AUTO: 28.4 PG
MCHC RBC AUTO-ENTMCNC: 32.4 G/DL
MCV RBC AUTO: 88 FL
MONOCYTES # BLD AUTO: 0.5 K/UL
MONOCYTES NFR BLD: 11.9 %
NEUTROPHILS # BLD AUTO: 2.5 K/UL
NEUTROPHILS NFR BLD: 64.3 %
PLATELET # BLD AUTO: 175 K/UL
PMV BLD AUTO: 10 FL
POTASSIUM SERPL-SCNC: 4.3 MMOL/L
PROT SERPL-MCNC: 7.1 G/DL
RBC # BLD AUTO: 4.29 M/UL
SODIUM SERPL-SCNC: 141 MMOL/L
TACROLIMUS BLD-MCNC: 2.8 NG/ML
WBC # BLD AUTO: 3.86 K/UL

## 2017-08-09 PROCEDURE — 36415 COLL VENOUS BLD VENIPUNCTURE: CPT

## 2017-08-09 PROCEDURE — 83735 ASSAY OF MAGNESIUM: CPT

## 2017-08-09 PROCEDURE — 86352 CELL FUNCTION ASSAY W/STIM: CPT

## 2017-08-09 PROCEDURE — 25000003 PHARM REV CODE 250: Performed by: INTERNAL MEDICINE

## 2017-08-09 PROCEDURE — 85025 COMPLETE CBC W/AUTO DIFF WBC: CPT

## 2017-08-09 PROCEDURE — 80053 COMPREHEN METABOLIC PANEL: CPT

## 2017-08-09 PROCEDURE — 99233 SBSQ HOSP IP/OBS HIGH 50: CPT | Mod: ,,, | Performed by: INTERNAL MEDICINE

## 2017-08-09 PROCEDURE — 99232 SBSQ HOSP IP/OBS MODERATE 35: CPT | Mod: ,,, | Performed by: INTERNAL MEDICINE

## 2017-08-09 PROCEDURE — 20600001 HC STEP DOWN PRIVATE ROOM

## 2017-08-09 PROCEDURE — 63600175 PHARM REV CODE 636 W HCPCS: Performed by: PHYSICIAN ASSISTANT

## 2017-08-09 PROCEDURE — 25000003 PHARM REV CODE 250: Performed by: PHYSICIAN ASSISTANT

## 2017-08-09 PROCEDURE — 80197 ASSAY OF TACROLIMUS: CPT

## 2017-08-09 RX ORDER — AMOXICILLIN AND CLAVULANATE POTASSIUM 500; 125 MG/1; MG/1
1 TABLET, FILM COATED ORAL 3 TIMES DAILY
Status: DISCONTINUED | OUTPATIENT
Start: 2017-08-09 | End: 2017-08-10 | Stop reason: HOSPADM

## 2017-08-09 RX ADMIN — LISINOPRIL 2.5 MG: 2.5 TABLET ORAL at 08:08

## 2017-08-09 RX ADMIN — ASPIRIN 81 MG: 81 TABLET, COATED ORAL at 08:08

## 2017-08-09 RX ADMIN — TACROLIMUS 1 MG: 1 CAPSULE ORAL at 08:08

## 2017-08-09 RX ADMIN — AMPICILLIN SODIUM AND SULBACTAM SODIUM 3 G: 2; 1 INJECTION, POWDER, FOR SOLUTION INTRAMUSCULAR; INTRAVENOUS at 05:08

## 2017-08-09 RX ADMIN — MYCOPHENOLATE MOFETIL 1000 MG: 250 CAPSULE ORAL at 08:08

## 2017-08-09 RX ADMIN — AMOXICILLIN AND CLAVULANATE POTASSIUM 500 MG: 500; 125 TABLET, FILM COATED ORAL at 01:08

## 2017-08-09 RX ADMIN — EVEROLIMUS 1 MG: 0.5 TABLET ORAL at 08:08

## 2017-08-09 RX ADMIN — VALACYCLOVIR HYDROCHLORIDE 2000 MG: 500 TABLET, FILM COATED ORAL at 08:08

## 2017-08-09 RX ADMIN — EVEROLIMUS 1 MG: 0.5 TABLET ORAL at 09:08

## 2017-08-09 RX ADMIN — LEVOTHYROXINE SODIUM 75 MCG: 75 TABLET ORAL at 05:08

## 2017-08-09 RX ADMIN — AMPICILLIN SODIUM AND SULBACTAM SODIUM 3 G: 2; 1 INJECTION, POWDER, FOR SOLUTION INTRAMUSCULAR; INTRAVENOUS at 12:08

## 2017-08-09 RX ADMIN — THERA TABS 1 TABLET: TAB at 08:08

## 2017-08-09 RX ADMIN — MYCOPHENOLATE MOFETIL 1000 MG: 250 CAPSULE ORAL at 09:08

## 2017-08-09 RX ADMIN — TACROLIMUS 1 MG: 1 CAPSULE ORAL at 05:08

## 2017-08-09 RX ADMIN — AMOXICILLIN AND CLAVULANATE POTASSIUM 500 MG: 500; 125 TABLET, FILM COATED ORAL at 09:08

## 2017-08-09 RX ADMIN — SULFAMETHOXAZOLE AND TRIMETHOPRIM 1 TABLET: 400; 80 TABLET ORAL at 08:08

## 2017-08-09 RX ADMIN — ATORVASTATIN CALCIUM 40 MG: 20 TABLET, FILM COATED ORAL at 08:08

## 2017-08-09 RX ADMIN — VALACYCLOVIR HYDROCHLORIDE 2000 MG: 500 TABLET, FILM COATED ORAL at 12:08

## 2017-08-09 NOTE — ASSESSMENT & PLAN NOTE
-Appreciate transplant ID consult  -Will transition to augmentin and plan to complete 10 day course

## 2017-08-09 NOTE — PROGRESS NOTES
[facility]  Heart Transplant  Progress Note    Patient Name: Cassandra Gonzales  MRN: [id number]  Admission Date: 8/7/2017  Hospital Length of Stay: 2 days  Attending Physician: Yazmin Jacobsen MD  Primary Care Provider: Esha Tee MD  Principal Problem:Cellulitis, face    Subjective:     Interval History: Patient without complaints this morning. Facial swelling improving, she reports dryness to the area. Instructed her to test a small amount of skin with moisturizer and see how her skin responds.     Continuous Infusions:   Scheduled Meds:   ampicillin-sulbactim (UNASYN) IVPB  3 g Intravenous Q6H    aspirin  81 mg Oral Daily    atorvastatin  40 mg Oral Daily    everolimus  1 mg Oral BID    levothyroxine  75 mcg Oral Before breakfast    lisinopril  2.5 mg Oral Daily    multivitamin  1 tablet Oral Daily    mycophenolate  1,000 mg Oral BID    sulfamethoxazole-trimethoprim 400-80mg  1 tablet Oral Daily    tacrolimus  1 mg Oral BID     PRN Meds:acetaminophen    Review of patient's allergies indicates:   Allergen Reactions    Rapamune [sirolimus]      Rash bilat arms      Objective:     Vital Signs (Most Recent):  Temp: 98.3 °F (36.8 °C) (08/09/17 0800)  Pulse: 98 (08/09/17 1100)  Resp: 18 (08/09/17 0800)  BP: 112/74 (08/09/17 0800)  SpO2: 98 % (08/09/17 0800) Vital Signs (24h Range):  Temp:  [97.8 °F (36.6 °C)-98.3 °F (36.8 °C)] 98.3 °F (36.8 °C)  Pulse:  [] 98  Resp:  [18-20] 18  SpO2:  [98 %-100 %] 98 %  BP: (112-118)/(60-77) 112/74     Weight: 60.1 kg (132 lb 7.9 oz)  Body mass index is 23.47 kg/m².      Intake/Output Summary (Last 24 hours) at 08/09/17 1202  Last data filed at 08/09/17 0741   Gross per 24 hour   Intake              900 ml   Output             2300 ml   Net            -1400 ml       Hemodynamic Parameters:           Physical Exam   Constitutional: She is oriented to person, place, and time. She appears well-developed and well-nourished.   HENT:    Head: Normocephalic and atraumatic.       improving   Neck: Normal range of motion. Neck supple. No JVD present.   Cardiovascular: S1 normal, S2 normal and normal heart sounds.  Tachycardia present.    Pulmonary/Chest: Effort normal and breath sounds normal. No respiratory distress. She has no wheezes. She has no rales.   Abdominal: Soft. Bowel sounds are normal. She exhibits no distension. There is no tenderness. There is no guarding.   Musculoskeletal: Normal range of motion. She exhibits no edema.   Neurological: She is alert and oriented to person, place, and time.   Skin: Skin is warm and dry. Capillary refill takes less than 2 seconds.   Nursing note and vitals reviewed.      Significant Labs:  CBC:    Recent Labs  Lab 08/09/17  0546   WBC 3.86*   RBC 4.29   HGB 12.2   HCT 37.7      MCV 88   MCH 28.4   MCHC 32.4     BNP:    Recent Labs  Lab 08/07/17  1043   BNP 86     CMP:    Recent Labs  Lab 08/09/17  0546   GLU 79   CALCIUM 9.3   ALBUMIN 3.7   PROT 7.1      K 4.3   CO2 20*      BUN 18   CREATININE 0.9   ALKPHOS 79   ALT 25   AST 36   BILITOT 0.4      Coagulation:   No results for input(s): INR, APTT in the last 72 hours.    Invalid input(s): PT  LDH:  No results for input(s): LDH in the last 72 hours.  Microbiology:  Microbiology Results (last 7 days)     Procedure Component Value Units Date/Time    Blood culture [[id number]] Collected:  08/07/17 1651    Order Status:  Completed Specimen:  Blood Updated:  08/08/17 2012     Blood Culture, Routine No Growth to date     Blood Culture, Routine No Growth to date          I have reviewed all pertinent labs within the past 24 hours.    Estimated Creatinine Clearance: 55 mL/min (based on Cr of 0.9).    Diagnostic Results:  I have reviewed and interpreted all pertinent imaging results/findings within the past 24 hours.    Assessment and Plan:     Left facial swelling    -No F/C or leukocytosis  -Spoke with Transplant ID: Shauna Colvin; get CT  max facial; get blood cultures; then will see pt and follow with us        Cardiac allograft vasculopathy    -Cont ASA, statin, tacro/everolimus        Heart transplanted    -h/o cellular rejection and CAV  -Cont tacro, everolimus and follow levels. Cont MMF for now          Hypothyroidism (acquired)    -Cont Synthroid        * Cellulitis, face    -Appreciate transplant ID consult  -Will transition to augmentin and plan to complete 10 day course            Mae Leonard PA-C  Heart Transplant  [facility]

## 2017-08-09 NOTE — PROGRESS NOTES
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 Patient has improved on empiric piperacillin/tazobactam + vancomycin. No evidence of deep tissue infection on imaging, will treat as cellulitis.  - Patient today with improvement on swelling and erythema  - CT scan sinus: Minimal paranasal sinus disease as discussed above. Rightward deviation of the nasal septum with associated bony nasal spur. No evidence of air-fluid level to suggest acute sinusitis.  - B/C NGTD  - ampicillin-sulbactam 3g IV q 6 hours was discontinued  - started on Augmentin today, will cover Strep + dogs oral aleksandar   - would complete total 14 days of antibiotic therapy ( last day 8/20/17)              Anticipated Disposition: Augmentin until 8/20/17    Thank you for your consult. I will sign off. Please contact us if you have any additional questions.    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 She first treated the area with hydrogen peroxide and topical antibiotics, but in the next 3 days she noticed worsening of the swelling, erythema and tender on left side of face. Puppy is 3 months old and up to date on vaccinations. She initially  was told to increase Bactrim to 2 pills BID, but despite this she continues to worsen. Denies any eye pain, headaches, ear pain, fever or chills.  Interval History: Afebrile, face swelling improving    Review of Systems   Constitutional: Negative for chills and fever.   HENT: Negative for sore throat.    Eyes: Negative for photophobia, pain, discharge, redness and visual disturbance.   Respiratory: Negative for cough, chest tightness and shortness of breath.    Cardiovascular: Negative for chest pain, palpitations and leg swelling.   Gastrointestinal: Negative for abdominal distention, abdominal pain, diarrhea, nausea and vomiting.   Genitourinary: Negative for dysuria, flank pain and urgency.   Skin: Negative for rash.   Neurological: Negative for dizziness, light-headedness and numbness.   Psychiatric/Behavioral: Negative for confusion.     Objective:     Vital Signs (Most Recent):  Temp: 97.5 °F (36.4 °C) (08/09/17 1233)  Pulse: (!) 113 (08/09/17 1600)  Resp: 18 (08/09/17 1233)  BP: 117/75 (08/09/17 1233)  SpO2: 100 % (08/09/17 1233) Vital Signs (24h Range):  Temp:  [97.5 °F (36.4 °C)-98.3 °F (36.8 °C)] 97.5 °F (36.4 °C)  Pulse:  [] 113  Resp:  [18-20] 18  SpO2:  [98 %-100 %] 100 %  BP: (112-118)/(60-75) 117/75     Weight: 60.1 kg (132 lb 7.9 oz)  Body mass index is 23.47 kg/m².    Estimated Creatinine Clearance: 55 mL/min (based on Cr of 0.9).    Physical Exam   Constitutional: She is oriented to person, place, and time. No distress.   HENT:   Head: Normocephalic.   Mouth/Throat: No oropharyngeal exudate.   Left cheek erythema and swelling decreasing from yesterday, skin is peeling off over that area   Eyes: No scleral icterus.   Cardiovascular:  Normal rate and regular rhythm.  Exam reveals no gallop and no friction rub.    No murmur heard.  Pulmonary/Chest: Effort normal. No respiratory distress. She has no wheezes. She has no rales.   Abdominal: Soft. Bowel sounds are normal. She exhibits no distension. There is no tenderness. There is no rebound and no guarding.   Musculoskeletal: Normal range of motion.   Lymphadenopathy:     She has no cervical adenopathy.   Neurological: She is alert and oriented to person, place, and time. No cranial nerve deficit.   Skin: She is not diaphoretic.   Vitals reviewed.      Significant Labs:   Blood Culture:   Recent Labs  Lab 08/07/17  1651   LABBLOO No Growth to date  No Growth to date     CBC:   Recent Labs  Lab 08/08/17  0512 08/09/17  0546   WBC 3.62* 3.86*   HGB 11.3* 12.2   HCT 34.0* 37.7    175     CMP:   Recent Labs  Lab 08/08/17  0511 08/09/17  0546    141   K 4.8 4.3    109   CO2 22* 20*   GLU 92 79   BUN 16 18   CREATININE 0.9 0.9   CALCIUM 9.1 9.3   PROT 6.8 7.1   ALBUMIN 3.6 3.7   BILITOT 0.4 0.4   ALKPHOS 75 79   AST 33 36   ALT 22 25   ANIONGAP 10 12   EGFRNONAA >60.0 >60.0     Microbiology Results (last 7 days)     Procedure Component Value Units Date/Time    Blood culture [[id number]] Collected:  08/07/17 1651    Order Status:  Completed Specimen:  Blood Updated:  08/08/17 2012     Blood Culture, Routine No Growth to date     Blood Culture, Routine No Growth to date          Significant Imaging: I have reviewed all pertinent imaging results/findings within the past 24 hours.

## 2017-08-09 NOTE — MEDICAL/APP STUDENT
Medical Student Progress Note:    Subjective  NAEO. Pt denies any f/c. Pt is tolerating a diet without n/v. Denies any diarrhea/constipation. Pt is urinating without complaints but does report increased smell to urine. Pt pain, swelling and redness is improving.     Objective  Vitals  T 98.3, HR , -127/60-80, ANNE  Vitals:    08/09/17 0000 08/09/17 0303 08/09/17 0400 08/09/17 0700   BP: 113/64  113/60    BP Location:       Patient Position:       BP Method:       Pulse: 107 87 94 (!) 112   Resp: 20  20    Temp: 97.8 °F (36.6 °C)  97.8 °F (36.6 °C)    TempSrc:       SpO2: 98%  98%    Weight:   60.1 kg (132 lb 7.9 oz)    Height:           Physical Exam:  Gen: NAD, sitting in chair.   HEENT: EOMI, PERRLA, MMM, no pharyngeal exudates or erythema. No cervical LAD. Pt has swelling, erythema, warmth and TTP over the maxillary sinus on the left side of the face considerably improved from yesterday. No nasal, eye discharge. Pt able to fully open mouth. Neck has full ROM. Pt has apthous ulcer on the inside of the lip on the right side improved from yesterday  CV: RRR no murmurs. Radial 2+. No peripheral edema   Pulm: CTA b/l. No rales, ronchi or wheezes. No increased WOB  Abd: NTND. +BS  : deferred   Ext: No peripheral edema appreciated.  Neuro: No neurologic or CN deficit appreciated.     Labs  Recent Results (from the past 24 hour(s))   Comprehensive metabolic panel    Collection Time: 08/09/17  5:46 AM   Result Value Ref Range    Sodium 141 136 - 145 mmol/L    Potassium 4.3 3.5 - 5.1 mmol/L    Chloride 109 95 - 110 mmol/L    CO2 20 (L) 23 - 29 mmol/L    Glucose 79 70 - 110 mg/dL    BUN, Bld 18 6 - 20 mg/dL    Creatinine 0.9 0.5 - 1.4 mg/dL    Calcium 9.3 8.7 - 10.5 mg/dL    Total Protein 7.1 6.0 - 8.4 g/dL    Albumin 3.7 3.5 - 5.2 g/dL    Total Bilirubin 0.4 0.1 - 1.0 mg/dL    Alkaline Phosphatase 79 55 - 135 U/L    AST 36 10 - 40 U/L    ALT 25 10 - 44 U/L    Anion Gap 12 8 - 16 mmol/L    eGFR if African  American >60.0 >60 mL/min/1.73 m^2    eGFR if non African American >60.0 >60 mL/min/1.73 m^2   Magnesium    Collection Time: 08/09/17  5:46 AM   Result Value Ref Range    Magnesium 1.8 1.6 - 2.6 mg/dL   CBC auto differential    Collection Time: 08/09/17  5:46 AM   Result Value Ref Range    WBC 3.86 (L) 3.90 - 12.70 K/uL    RBC 4.29 4.00 - 5.40 M/uL    Hemoglobin 12.2 12.0 - 16.0 g/dL    Hematocrit 37.7 37.0 - 48.5 %    MCV 88 82 - 98 fL    MCH 28.4 27.0 - 31.0 pg    MCHC 32.4 32.0 - 36.0 g/dL    RDW 15.1 (H) 11.5 - 14.5 %    Platelets 175 150 - 350 K/uL    MPV 10.0 9.2 - 12.9 fL    Gran # 2.5 1.8 - 7.7 K/uL    Lymph # 0.8 (L) 1.0 - 4.8 K/uL    Mono # 0.5 0.3 - 1.0 K/uL    Eos # 0.1 0.0 - 0.5 K/uL    Baso # 0.01 0.00 - 0.20 K/uL    Gran% 64.3 38.0 - 73.0 %    Lymph% 19.9 18.0 - 48.0 %    Mono% 11.9 4.0 - 15.0 %    Eosinophil% 3.1 0.0 - 8.0 %    Basophil% 0.3 0.0 - 1.9 %    Differential Method Automated        Meds    Current Facility-Administered Medications:     acetaminophen tablet 650 mg, 650 mg, Oral, Q4H PRN, Mindy Mast PA-C, 650 mg at 08/08/17 0345    ampicillin-sulbactam 3 g in sodium chloride 0.9 % 100 mL IVPB (ready to mix system), 3 g, Intravenous, Q6H, Mae Leonard PA-C, Last Rate: 100 mL/hr at 08/09/17 0501, 3 g at 08/09/17 0501    aspirin EC tablet 81 mg, 81 mg, Oral, Daily, Mindy Mast PA-C, 81 mg at 08/08/17 0802    atorvastatin tablet 40 mg, 40 mg, Oral, Daily, Mindy Mast, PA-C, 40 mg at 08/08/17 0802    everolimus tablet 1 mg, 1 mg, Oral, BID, Mindy Mast, PA-C, 1 mg at 08/08/17 2127    levothyroxine tablet 75 mcg, 75 mcg, Oral, Before breakfast, YAHIR Wahl-C, 75 mcg at 08/09/17 0501    lisinopril tablet 2.5 mg, 2.5 mg, Oral, Daily, Mindy Mast PA-C, 2.5 mg at 08/08/17 0802    multivitamin tablet 1 tablet, 1 tablet, Oral, Daily, YAHIR Wahl-C, 1 tablet at 08/08/17 0802    mycophenolate capsule 1,000 mg,  1,000 mg, Oral, BID, Mindy Mast, PA-C, 1,000 mg at 08/08/17 2127    sulfamethoxazole-trimethoprim 400-80mg per tablet 1 tablet, 1 tablet, Oral, Daily, Mindy Mast PA-C, 1 tablet at 08/08/17 0802    tacrolimus capsule 1 mg, 1 mg, Oral, BID, Mindy Mast PA-C, 1 mg at 08/08/17 1752    valacyclovir tablet 2,000 mg, 2,000 mg, Oral, BID, Jackie Fischer MD, 2,000 mg at 08/09/17 0011    Micro:  Microbiology Results (last 7 days)     Procedure Component Value Units Date/Time    Blood culture [[id number]] Collected:  08/07/17 1651    Order Status:  Completed Specimen:  Blood Updated:  08/08/17 2012     Blood Culture, Routine No Growth to date     Blood Culture, Routine No Growth to date          Lines:     Assessment/Plan:  Pt is a 63 yo F with h/o of HT 3/2016 CMV d+/r- 2/2 chronic peripartum dilated CMP c/b moderate rejection (12/2016) on tacro/MMF/evrolimus maintenance, cardiac allograft vasculopathy (4/2016), CMV infx (viremia cleared 12/1/2016), HSV infx and PMHX of HTN and hypothyroidism who we are consulted for facial cellulitis 2/2 dog scratch that failed bactrim therapy as OP.     - CMV not-detected in peripheral quant.  - Symptoms improved with IV abx per pt and based on photo from time of admit and now on IV amp-sulbactam and PO bactrim - -Consider de-escalation to PO (14 course, currently on day 3) abx x 1 week with f/u with PCP for symptoms resolution.  - BCx continue to be NG and will continue to follow.   - Continue valtrex x 1 dose for oral ulcer.    Bernard Landry-Wegener, L4  [employer]   08/09/2017

## 2017-08-09 NOTE — ASSESSMENT & PLAN NOTE
63 y/o woman w/a history of peripartum NICM (dx ~2000, c/b MV disease s/p MV repair and subsequent mMVR in 2007; ultimately underwent OHTx 3/25/2016, CMV D+/R-, thymo induction, on maintenance everolimus/tacrolimus/MMF) admitted on 8/7/17 with left sided facial swelling and erythema after her puppy scratched her on 8/3/17. Patient has improved on empiric piperacillin/tazobactam + vancomycin. No evidence of deep tissue infection on imaging, will treat as cellulitis.  - Patient today with improvement on swelling and erythema  - CT scan sinus: Minimal paranasal sinus disease as discussed above. Rightward deviation of the nasal septum with associated bony nasal spur. No evidence of air-fluid level to suggest acute sinusitis.  - B/C NGTD  - ampicillin-sulbactam 3g IV q 6 hours was discontinued  - started on Augmentin today, will cover Strep + dogs oral aleksandar   - would complete total 14 days of antibiotic therapy ( last day 8/20/17)

## 2017-08-09 NOTE — PLAN OF CARE
Problem: Patient Care Overview  Goal: Plan of Care Review  Outcome: Ongoing (interventions implemented as appropriate)  Patient AAOx4 and VSS. She has remained afebrile. Telemetry monitor is on and HR has been in the 90s, NS. She was switched from IV antibiotics to PO Augmentin today. The left side of her face is swollen and reddened but looks better than it did yesterday. Patient is able to ambulate without assistance and has experienced no falls today. Her only question is when she might be discharged. Nurse unsure but will ask team. She's stable and will continue to monitor.

## 2017-08-09 NOTE — SUBJECTIVE & OBJECTIVE
Interval History: Patient without complaints this morning. Facial swelling improving, she reports dryness to the area. Instructed her to test a small amount of skin with moisturizer and see how her skin responds.     Continuous Infusions:   Scheduled Meds:   ampicillin-sulbactim (UNASYN) IVPB  3 g Intravenous Q6H    aspirin  81 mg Oral Daily    atorvastatin  40 mg Oral Daily    everolimus  1 mg Oral BID    levothyroxine  75 mcg Oral Before breakfast    lisinopril  2.5 mg Oral Daily    multivitamin  1 tablet Oral Daily    mycophenolate  1,000 mg Oral BID    sulfamethoxazole-trimethoprim 400-80mg  1 tablet Oral Daily    tacrolimus  1 mg Oral BID     PRN Meds:acetaminophen    Review of patient's allergies indicates:   Allergen Reactions    Rapamune [sirolimus]      Rash bilat arms      Objective:     Vital Signs (Most Recent):  Temp: 98.3 °F (36.8 °C) (08/09/17 0800)  Pulse: 98 (08/09/17 1100)  Resp: 18 (08/09/17 0800)  BP: 112/74 (08/09/17 0800)  SpO2: 98 % (08/09/17 0800) Vital Signs (24h Range):  Temp:  [97.8 °F (36.6 °C)-98.3 °F (36.8 °C)] 98.3 °F (36.8 °C)  Pulse:  [] 98  Resp:  [18-20] 18  SpO2:  [98 %-100 %] 98 %  BP: (112-118)/(60-77) 112/74     Weight: 60.1 kg (132 lb 7.9 oz)  Body mass index is 23.47 kg/m².      Intake/Output Summary (Last 24 hours) at 08/09/17 1202  Last data filed at 08/09/17 0741   Gross per 24 hour   Intake              900 ml   Output             2300 ml   Net            -1400 ml       Hemodynamic Parameters:           Physical Exam   Constitutional: She is oriented to person, place, and time. She appears well-developed and well-nourished.   HENT:   Head: Normocephalic and atraumatic.       improving   Neck: Normal range of motion. Neck supple. No JVD present.   Cardiovascular: S1 normal, S2 normal and normal heart sounds.  Tachycardia present.    Pulmonary/Chest: Effort normal and breath sounds normal. No respiratory distress. She has no wheezes. She has no rales.    Abdominal: Soft. Bowel sounds are normal. She exhibits no distension. There is no tenderness. There is no guarding.   Musculoskeletal: Normal range of motion. She exhibits no edema.   Neurological: She is alert and oriented to person, place, and time.   Skin: Skin is warm and dry. Capillary refill takes less than 2 seconds.   Nursing note and vitals reviewed.      Significant Labs:  CBC:    Recent Labs  Lab 08/09/17  0546   WBC 3.86*   RBC 4.29   HGB 12.2   HCT 37.7      MCV 88   MCH 28.4   MCHC 32.4     BNP:    Recent Labs  Lab 08/07/17  1043   BNP 86     CMP:    Recent Labs  Lab 08/09/17  0546   GLU 79   CALCIUM 9.3   ALBUMIN 3.7   PROT 7.1      K 4.3   CO2 20*      BUN 18   CREATININE 0.9   ALKPHOS 79   ALT 25   AST 36   BILITOT 0.4      Coagulation:   No results for input(s): INR, APTT in the last 72 hours.    Invalid input(s): PT  LDH:  No results for input(s): LDH in the last 72 hours.  Microbiology:  Microbiology Results (last 7 days)     Procedure Component Value Units Date/Time    Blood culture [[id number]] Collected:  08/07/17 1651    Order Status:  Completed Specimen:  Blood Updated:  08/08/17 2012     Blood Culture, Routine No Growth to date     Blood Culture, Routine No Growth to date          I have reviewed all pertinent labs within the past 24 hours.    Estimated Creatinine Clearance: 55 mL/min (based on Cr of 0.9).    Diagnostic Results:  I have reviewed and interpreted all pertinent imaging results/findings within the past 24 hours.

## 2017-08-09 NOTE — SUBJECTIVE & OBJECTIVE
Interval History: Afebrile, face swelling improving    Review of Systems   Constitutional: Negative for chills and fever.   HENT: Negative for sore throat.    Eyes: Negative for photophobia, pain, discharge, redness and visual disturbance.   Respiratory: Negative for cough, chest tightness and shortness of breath.    Cardiovascular: Negative for chest pain, palpitations and leg swelling.   Gastrointestinal: Negative for abdominal distention, abdominal pain, diarrhea, nausea and vomiting.   Genitourinary: Negative for dysuria, flank pain and urgency.   Skin: Negative for rash.   Neurological: Negative for dizziness, light-headedness and numbness.   Psychiatric/Behavioral: Negative for confusion.     Objective:     Vital Signs (Most Recent):  Temp: 97.5 °F (36.4 °C) (08/09/17 1233)  Pulse: (!) 113 (08/09/17 1600)  Resp: 18 (08/09/17 1233)  BP: 117/75 (08/09/17 1233)  SpO2: 100 % (08/09/17 1233) Vital Signs (24h Range):  Temp:  [97.5 °F (36.4 °C)-98.3 °F (36.8 °C)] 97.5 °F (36.4 °C)  Pulse:  [] 113  Resp:  [18-20] 18  SpO2:  [98 %-100 %] 100 %  BP: (112-118)/(60-75) 117/75     Weight: 60.1 kg (132 lb 7.9 oz)  Body mass index is 23.47 kg/m².    Estimated Creatinine Clearance: 55 mL/min (based on Cr of 0.9).    Physical Exam   Constitutional: She is oriented to person, place, and time. No distress.   HENT:   Head: Normocephalic.   Mouth/Throat: No oropharyngeal exudate.   Left cheek erythema and swelling decreasing from yesterday, skin is peeling off over that area   Eyes: No scleral icterus.   Cardiovascular: Normal rate and regular rhythm.  Exam reveals no gallop and no friction rub.    No murmur heard.  Pulmonary/Chest: Effort normal. No respiratory distress. She has no wheezes. She has no rales.   Abdominal: Soft. Bowel sounds are normal. She exhibits no distension. There is no tenderness. There is no rebound and no guarding.   Musculoskeletal: Normal range of motion.   Lymphadenopathy:     She has no cervical  adenopathy.   Neurological: She is alert and oriented to person, place, and time. No cranial nerve deficit.   Skin: She is not diaphoretic.   Vitals reviewed.      Significant Labs:   Blood Culture:   Recent Labs  Lab 08/07/17  1651   LABBLOO No Growth to date  No Growth to date     CBC:   Recent Labs  Lab 08/08/17  0512 08/09/17  0546   WBC 3.62* 3.86*   HGB 11.3* 12.2   HCT 34.0* 37.7    175     CMP:   Recent Labs  Lab 08/08/17  0511 08/09/17  0546    141   K 4.8 4.3    109   CO2 22* 20*   GLU 92 79   BUN 16 18   CREATININE 0.9 0.9   CALCIUM 9.1 9.3   PROT 6.8 7.1   ALBUMIN 3.6 3.7   BILITOT 0.4 0.4   ALKPHOS 75 79   AST 33 36   ALT 22 25   ANIONGAP 10 12   EGFRNONAA >60.0 >60.0     Microbiology Results (last 7 days)     Procedure Component Value Units Date/Time    Blood culture [[id number]] Collected:  08/07/17 1651    Order Status:  Completed Specimen:  Blood Updated:  08/08/17 2012     Blood Culture, Routine No Growth to date     Blood Culture, Routine No Growth to date          Significant Imaging: I have reviewed all pertinent imaging results/findings within the past 24 hours.

## 2017-08-09 NOTE — PLAN OF CARE
Problem: Patient Care Overview  Goal: Plan of Care Review  Outcome: Ongoing (interventions implemented as appropriate)  Pt has call bell in reach, non slip socks on, and bedrails up x2. Pt encouraged to wash hands. Pt is on iv antibiotics.

## 2017-08-10 VITALS
SYSTOLIC BLOOD PRESSURE: 142 MMHG | TEMPERATURE: 99 F | DIASTOLIC BLOOD PRESSURE: 80 MMHG | HEIGHT: 63 IN | OXYGEN SATURATION: 99 % | BODY MASS INDEX: 22.3 KG/M2 | HEART RATE: 99 BPM | WEIGHT: 125.88 LBS | RESPIRATION RATE: 15 BRPM

## 2017-08-10 LAB
ALBUMIN SERPL BCP-MCNC: 3.5 G/DL
ALP SERPL-CCNC: 75 U/L
ALT SERPL W/O P-5'-P-CCNC: 28 U/L
ANION GAP SERPL CALC-SCNC: 8 MMOL/L
AST SERPL-CCNC: 37 U/L
BASOPHILS # BLD AUTO: 0.02 K/UL
BASOPHILS NFR BLD: 0.7 %
BILIRUB SERPL-MCNC: 0.3 MG/DL
BUN SERPL-MCNC: 16 MG/DL
CALCIUM SERPL-MCNC: 9.1 MG/DL
CHLORIDE SERPL-SCNC: 108 MMOL/L
CO2 SERPL-SCNC: 24 MMOL/L
CREAT SERPL-MCNC: 0.8 MG/DL
DIFFERENTIAL METHOD: ABNORMAL
EOSINOPHIL # BLD AUTO: 0.1 K/UL
EOSINOPHIL NFR BLD: 4.6 %
ERYTHROCYTE [DISTWIDTH] IN BLOOD BY AUTOMATED COUNT: 14.8 %
EST. GFR  (AFRICAN AMERICAN): >60 ML/MIN/1.73 M^2
EST. GFR  (NON AFRICAN AMERICAN): >60 ML/MIN/1.73 M^2
GLUCOSE SERPL-MCNC: 94 MG/DL
HCT VFR BLD AUTO: 34.7 %
HGB BLD-MCNC: 11.2 G/DL
LYMPHOCYTES # BLD AUTO: 1.1 K/UL
LYMPHOCYTES NFR BLD: 36.1 %
MAGNESIUM SERPL-MCNC: 1.8 MG/DL
MCH RBC QN AUTO: 28.2 PG
MCHC RBC AUTO-ENTMCNC: 32.3 G/DL
MCV RBC AUTO: 87 FL
MONOCYTES # BLD AUTO: 0.6 K/UL
MONOCYTES NFR BLD: 20.7 %
NEUTROPHILS # BLD AUTO: 1.2 K/UL
NEUTROPHILS NFR BLD: 37.6 %
PLATELET # BLD AUTO: 162 K/UL
PMV BLD AUTO: 9.8 FL
POTASSIUM SERPL-SCNC: 4.3 MMOL/L
PROT SERPL-MCNC: 6.7 G/DL
RBC # BLD AUTO: 3.97 M/UL
SODIUM SERPL-SCNC: 140 MMOL/L
TACROLIMUS BLD-MCNC: 3.8 NG/ML
WBC # BLD AUTO: 3.05 K/UL

## 2017-08-10 PROCEDURE — 85025 COMPLETE CBC W/AUTO DIFF WBC: CPT

## 2017-08-10 PROCEDURE — 99232 SBSQ HOSP IP/OBS MODERATE 35: CPT | Mod: ,,, | Performed by: INTERNAL MEDICINE

## 2017-08-10 PROCEDURE — 83735 ASSAY OF MAGNESIUM: CPT

## 2017-08-10 PROCEDURE — 36415 COLL VENOUS BLD VENIPUNCTURE: CPT

## 2017-08-10 PROCEDURE — 25000003 PHARM REV CODE 250: Performed by: INTERNAL MEDICINE

## 2017-08-10 PROCEDURE — 63600175 PHARM REV CODE 636 W HCPCS: Performed by: PHYSICIAN ASSISTANT

## 2017-08-10 PROCEDURE — 80053 COMPREHEN METABOLIC PANEL: CPT

## 2017-08-10 PROCEDURE — 25000003 PHARM REV CODE 250: Performed by: PHYSICIAN ASSISTANT

## 2017-08-10 PROCEDURE — 80197 ASSAY OF TACROLIMUS: CPT

## 2017-08-10 RX ORDER — SULFAMETHOXAZOLE AND TRIMETHOPRIM 400; 80 MG/1; MG/1
1 TABLET ORAL DAILY
Qty: 30 TABLET | Refills: 11
Start: 2017-08-10 | End: 2017-11-30

## 2017-08-10 RX ORDER — TACROLIMUS 1 MG/1
1 CAPSULE ORAL EVERY 12 HOURS
Qty: 240 CAPSULE | Refills: 11
Start: 2017-08-10 | End: 2017-08-22 | Stop reason: SDUPTHER

## 2017-08-10 RX ORDER — AMOXICILLIN AND CLAVULANATE POTASSIUM 500; 125 MG/1; MG/1
1 TABLET, FILM COATED ORAL 3 TIMES DAILY
Qty: 30 TABLET | Refills: 0 | Status: SHIPPED | OUTPATIENT
Start: 2017-08-10 | End: 2017-08-20

## 2017-08-10 RX ADMIN — MYCOPHENOLATE MOFETIL 1000 MG: 250 CAPSULE ORAL at 08:08

## 2017-08-10 RX ADMIN — SULFAMETHOXAZOLE AND TRIMETHOPRIM 1 TABLET: 400; 80 TABLET ORAL at 08:08

## 2017-08-10 RX ADMIN — TACROLIMUS 1 MG: 1 CAPSULE ORAL at 08:08

## 2017-08-10 RX ADMIN — EVEROLIMUS 1 MG: 0.5 TABLET ORAL at 08:08

## 2017-08-10 RX ADMIN — AMOXICILLIN AND CLAVULANATE POTASSIUM 500 MG: 500; 125 TABLET, FILM COATED ORAL at 05:08

## 2017-08-10 RX ADMIN — THERA TABS 1 TABLET: TAB at 08:08

## 2017-08-10 RX ADMIN — ASPIRIN 81 MG: 81 TABLET, COATED ORAL at 08:08

## 2017-08-10 RX ADMIN — ATORVASTATIN CALCIUM 40 MG: 20 TABLET, FILM COATED ORAL at 08:08

## 2017-08-10 RX ADMIN — AMOXICILLIN AND CLAVULANATE POTASSIUM 500 MG: 500; 125 TABLET, FILM COATED ORAL at 01:08

## 2017-08-10 RX ADMIN — LISINOPRIL 2.5 MG: 2.5 TABLET ORAL at 10:08

## 2017-08-10 RX ADMIN — LEVOTHYROXINE SODIUM 75 MCG: 75 TABLET ORAL at 05:08

## 2017-08-10 NOTE — ASSESSMENT & PLAN NOTE
-Appreciate transplant ID consult  -Will transition to augmentin and plan to complete 10 day course  -D/C

## 2017-08-10 NOTE — NURSING
Patient ready for dc once friend arrives to transport home. Printed AVS printed/ reviewed. Blue med card updated by PharmD. Patient to  needed Rx from outpatient pharmacy. Patient to followup with HTS in 2 weeks. Aware of signs/symptoms of rejection/infection to report to MD.patient verbalizes understanding of all dc instructions.

## 2017-08-10 NOTE — NURSING
Discharged from TSU, ambulatory. To meet friend at hospital enytrance after picking up meds from pharmacy.

## 2017-08-10 NOTE — SUBJECTIVE & OBJECTIVE
Interval History: Patient without complaints this morning. Ready to go home.     Continuous Infusions:   Scheduled Meds:   amoxicillin-clavulanate 500-125mg  1 tablet Oral TID    aspirin  81 mg Oral Daily    atorvastatin  40 mg Oral Daily    everolimus  1 mg Oral BID    levothyroxine  75 mcg Oral Before breakfast    lisinopril  2.5 mg Oral Daily    multivitamin  1 tablet Oral Daily    mycophenolate  1,000 mg Oral BID    sulfamethoxazole-trimethoprim 400-80mg  1 tablet Oral Daily    tacrolimus  1 mg Oral BID     PRN Meds:acetaminophen    Review of patient's allergies indicates:   Allergen Reactions    Rapamune [sirolimus]      Rash bilat arms      Objective:     Vital Signs (Most Recent):  Temp: 98.1 °F (36.7 °C) (08/10/17 0830)  Pulse: 91 (08/10/17 1054)  Resp: 18 (08/10/17 0830)  BP: 116/76 (08/10/17 0830)  SpO2: 99 % (08/10/17 0000) Vital Signs (24h Range):  Temp:  [97.5 °F (36.4 °C)-98.5 °F (36.9 °C)] 98.1 °F (36.7 °C)  Pulse:  [] 91  Resp:  [18] 18  SpO2:  [99 %-100 %] 99 %  BP: (116-123)/(75-82) 116/76     Weight: 57.1 kg (125 lb 14.1 oz)  Body mass index is 22.3 kg/m².      Intake/Output Summary (Last 24 hours) at 08/10/17 1111  Last data filed at 08/10/17 1030   Gross per 24 hour   Intake             5175 ml   Output             2000 ml   Net             3175 ml       Hemodynamic Parameters:           Physical Exam   Constitutional: She is oriented to person, place, and time. She appears well-developed and well-nourished.   HENT:   Head: Normocephalic and atraumatic.       improving   Neck: Normal range of motion. Neck supple. No JVD present.   Cardiovascular: S1 normal, S2 normal and normal heart sounds.  Tachycardia present.    Pulmonary/Chest: Effort normal and breath sounds normal. No respiratory distress. She has no wheezes. She has no rales.   Abdominal: Soft. Bowel sounds are normal. She exhibits no distension. There is no tenderness. There is no guarding.   Musculoskeletal: Normal  range of motion. She exhibits no edema.   Neurological: She is alert and oriented to person, place, and time.   Skin: Skin is warm and dry. Capillary refill takes less than 2 seconds.   Nursing note and vitals reviewed.      Significant Labs:  CBC:    Recent Labs  Lab 08/10/17  0413   WBC 3.05*   RBC 3.97*   HGB 11.2*   HCT 34.7*      MCV 87   MCH 28.2   MCHC 32.3     BNP:  No results for input(s): BNP in the last 72 hours.    Invalid input(s): BNPTRIAGELBLO  CMP:    Recent Labs  Lab 08/10/17  0413   GLU 94   CALCIUM 9.1   ALBUMIN 3.5   PROT 6.7      K 4.3   CO2 24      BUN 16   CREATININE 0.8   ALKPHOS 75   ALT 28   AST 37   BILITOT 0.3      Coagulation:   No results for input(s): INR, APTT in the last 72 hours.    Invalid input(s): PT  LDH:  No results for input(s): LDH in the last 72 hours.  Microbiology:  Microbiology Results (last 7 days)     Procedure Component Value Units Date/Time    Blood culture [[id number]] Collected:  08/07/17 1651    Order Status:  Completed Specimen:  Blood Updated:  08/09/17 2012     Blood Culture, Routine No Growth to date     Blood Culture, Routine No Growth to date     Blood Culture, Routine No Growth to date          I have reviewed all pertinent labs within the past 24 hours.    Estimated Creatinine Clearance: 61.9 mL/min (based on Cr of 0.8).    Diagnostic Results:  I have reviewed and interpreted all pertinent imaging results/findings within the past 24 hours.

## 2017-08-10 NOTE — PROGRESS NOTES
DISCHARGE    Pt presents in room alone, aaox4, seated in chair with pleasant affect. Pt states in agreement with plan to discharge to home today with no needs. Pt's caregiver/friend Rosenda will transport pt home. Pt reports coping well and denies further needs, questions, concerns at this time and none indicated. Providing psychosocial and counseling support, education, resources, assistance and discharge planning as indicated. SW remains available.

## 2017-08-10 NOTE — PLAN OF CARE
Problem: Patient Care Overview  Goal: Plan of Care Review  Outcome: Ongoing (interventions implemented as appropriate)  Pt has call bell in reach, non slip socks on, and bedrails up x2. Pt encouraged to wash hands. Pt is on po antibiotics.

## 2017-08-10 NOTE — PROGRESS NOTES
[facility]  Heart Transplant  Progress Note    Patient Name: Cassandra Gonzales  MRN: [id number]  Admission Date: 8/7/2017  Hospital Length of Stay: 3 days  Attending Physician: Yazmin Jacobsen MD  Primary Care Provider: Esha Tee MD  Principal Problem:Cellulitis, face    Subjective:     Interval History: Patient without complaints this morning. Ready to go home.     Continuous Infusions:   Scheduled Meds:   amoxicillin-clavulanate 500-125mg  1 tablet Oral TID    aspirin  81 mg Oral Daily    atorvastatin  40 mg Oral Daily    everolimus  1 mg Oral BID    levothyroxine  75 mcg Oral Before breakfast    lisinopril  2.5 mg Oral Daily    multivitamin  1 tablet Oral Daily    mycophenolate  1,000 mg Oral BID    sulfamethoxazole-trimethoprim 400-80mg  1 tablet Oral Daily    tacrolimus  1 mg Oral BID     PRN Meds:acetaminophen    Review of patient's allergies indicates:   Allergen Reactions    Rapamune [sirolimus]      Rash bilat arms      Objective:     Vital Signs (Most Recent):  Temp: 98.1 °F (36.7 °C) (08/10/17 0830)  Pulse: 91 (08/10/17 1054)  Resp: 18 (08/10/17 0830)  BP: 116/76 (08/10/17 0830)  SpO2: 99 % (08/10/17 0000) Vital Signs (24h Range):  Temp:  [97.5 °F (36.4 °C)-98.5 °F (36.9 °C)] 98.1 °F (36.7 °C)  Pulse:  [] 91  Resp:  [18] 18  SpO2:  [99 %-100 %] 99 %  BP: (116-123)/(75-82) 116/76     Weight: 57.1 kg (125 lb 14.1 oz)  Body mass index is 22.3 kg/m².      Intake/Output Summary (Last 24 hours) at 08/10/17 1111  Last data filed at 08/10/17 1030   Gross per 24 hour   Intake             5175 ml   Output             2000 ml   Net             3175 ml       Hemodynamic Parameters:           Physical Exam   Constitutional: She is oriented to person, place, and time. She appears well-developed and well-nourished.   HENT:   Head: Normocephalic and atraumatic.       improving   Neck: Normal range of motion. Neck supple. No JVD present.   Cardiovascular: S1 normal, S2  normal and normal heart sounds.  Tachycardia present.    Pulmonary/Chest: Effort normal and breath sounds normal. No respiratory distress. She has no wheezes. She has no rales.   Abdominal: Soft. Bowel sounds are normal. She exhibits no distension. There is no tenderness. There is no guarding.   Musculoskeletal: Normal range of motion. She exhibits no edema.   Neurological: She is alert and oriented to person, place, and time.   Skin: Skin is warm and dry. Capillary refill takes less than 2 seconds.   Nursing note and vitals reviewed.      Significant Labs:  CBC:    Recent Labs  Lab 08/10/17  0413   WBC 3.05*   RBC 3.97*   HGB 11.2*   HCT 34.7*      MCV 87   MCH 28.2   MCHC 32.3     BNP:  No results for input(s): BNP in the last 72 hours.    Invalid input(s): BNPTRIAGELBLO  CMP:    Recent Labs  Lab 08/10/17  0413   GLU 94   CALCIUM 9.1   ALBUMIN 3.5   PROT 6.7      K 4.3   CO2 24      BUN 16   CREATININE 0.8   ALKPHOS 75   ALT 28   AST 37   BILITOT 0.3      Coagulation:   No results for input(s): INR, APTT in the last 72 hours.    Invalid input(s): PT  LDH:  No results for input(s): LDH in the last 72 hours.  Microbiology:  Microbiology Results (last 7 days)     Procedure Component Value Units Date/Time    Blood culture [[id number]] Collected:  08/07/17 1651    Order Status:  Completed Specimen:  Blood Updated:  08/09/17 2012     Blood Culture, Routine No Growth to date     Blood Culture, Routine No Growth to date     Blood Culture, Routine No Growth to date          I have reviewed all pertinent labs within the past 24 hours.    Estimated Creatinine Clearance: 61.9 mL/min (based on Cr of 0.8).    Diagnostic Results:  I have reviewed and interpreted all pertinent imaging results/findings within the past 24 hours.    Assessment and Plan:     Left facial swelling    -No F/C or leukocytosis  -Spoke with Transplant ID: Start Shauna Cho; get CT max facial; get blood cultures; then will see pt and  follow with us        Cardiac allograft vasculopathy    -Cont ASA, statin, tacro/everolimus        Heart transplanted    -h/o cellular rejection and CAV  -Cont tacro, everolimus and follow levels. Cont MMF for now          Hypothyroidism (acquired)    -Cont Synthroid        * Cellulitis, face    -Appreciate transplant ID consult  -Will transition to augmentin and plan to complete 10 day course  -D/C            Mae Leonard PA-C  Heart Transplant  [facility]

## 2017-08-11 ENCOUNTER — PATIENT MESSAGE (OUTPATIENT)
Dept: TRANSPLANT | Facility: CLINIC | Age: 62
End: 2017-08-11

## 2017-08-11 LAB
CLASS I ANTIBODY COMMENTS - LUMINEX: NORMAL
CLASS II ANTIBODY COMMENTS - LUMINEX: NORMAL
DSA1 TESTING DATE: NORMAL
DSA2 TESTING DATE: NORMAL
IMMUNKNOW (STIMULATED): 273 NG/ML
SERUM COLLECTION DT - LUMINEX CLASS I: NORMAL
SERUM COLLECTION DT - LUMINEX CLASS II: NORMAL

## 2017-08-11 NOTE — PROGRESS NOTES
Mrs. Gonzales will be discharged after admission for facial dog scratch.  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  She was initially treated with broad spectrum antibiotics, and was discharged with augmentin once cellulitis improved.  Immunosuppression remains unchanged, with EVL 1mg BID (goal 3-8ng/mL), and tacrolimus 1mg BID (goal 3-6ng/mL), and MMF 1000mg BID.  Medication counseling was provided to the patient prior to discharge.      Followup:  CBC, CMP, tacrolimus level in 1 week  HLA DSA and immuknow drawn in house - if appropriate, consider reduction of MMF to 500mg BID, as patient is receiving triple immunosuppressive therapy and is far enough out from rejection episode.    Continue monitoring for improved facial cellulitis and WICHO of everolimus (i.e. Mouth ulcers)     Cassandra Gonzales   Home Medication Instructions YAQUELIN:[id number]    Printed on:08/11/17 4375   Medication Information                      amoxicillin-clavulanate 500-125mg (AUGMENTIN) 500-125 mg Tab  Take 1 tablet (500 mg total) by mouth 3 (three) times daily.             aspirin (ECOTRIN) 81 MG EC tablet  Take 1 tablet (81 mg total) by mouth once daily.             atorvastatin (LIPITOR) 40 MG tablet  Take 1 tablet (40 mg total) by mouth once daily.             everolimus (ZORTRESS) 0.5 mg tablet  Take 2 tablets (1 mg total) by mouth 2 (two) times daily.             levothyroxine (SYNTHROID) 75 MCG tablet  Take 1 tablet (75 mcg total) by mouth before breakfast.             lisinopril (PRINIVIL,ZESTRIL) 5 MG tablet  Take 0.5 tablets (2.5 mg total) by mouth once daily.             multivitamin (THERAGRAN) tablet  Take 1 tablet by mouth once daily.             mycophenolate (CELLCEPT) 250 mg Cap  Take 4 capsules (1,000 mg total) by mouth 2 (two) times daily.             sulfamethoxazole-trimethoprim 400-80mg (BACTRIM,SEPTRA) 400-80 mg per tablet  Take 1 tablet by mouth once daily. Z94.1  heart replaced by transplant             tacrolimus (PROGRAF) 1 MG Cap  Take 1 capsule (1 mg total) by mouth every 12 (twelve) hours.

## 2017-08-12 LAB — BACTERIA BLD CULT: NORMAL

## 2017-08-15 NOTE — DISCHARGE SUMMARY
[facility]  Heart Transplant  Discharge Summary      Patient Name: Cassandra Gonzales  MRN: [id number]  Admission Date: 8/7/2017  Hospital Length of Stay: 3 days  Discharge Date and Time: 08/14/2017 8:38 PM  Attending Physician: No att. 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 SHe says she had a superficial scratch at first which she cleaned with peroxide and neosporin. Swelling and redness becamse worse so she called here and was told to increase Bactrim to 2 pills BID. Swelling and redness today were even worse on L side of her face and around L eye and so she was told to come here for admission. She is not taking anything OTC. No F/C, cardiopulmonary symptoms, vision changes.    * No surgery found *     Hospital Course: Patient was started on broad spectrum antibiotics, CT maxillofacial was obtained, and transplant ID was consulted. Patient clinically improved on broad spectrum abx and was switched to ampicillin-sulbactam, followed by PO augmentin with a plan for a 14 day course. She also was started on valtrex for oral HSV.     Consults         Status Ordering Provider     Inpatient consult to Infectious Diseases  Once     Provider:  (Not yet assigned)    YINKA Thomas          Significant Diagnostic Studies:     Pending Diagnostic Studies:     None        Final Active Diagnoses:    Diagnosis Date Noted POA    PRINCIPAL PROBLEM:  Cellulitis,  face [L03.211] 08/09/2017 Yes    Dog scratch [W54.8XXA] 08/09/2017 Yes    Left facial swelling [R22.0] 08/07/2017 Yes    Cardiac allograft vasculopathy [T86.290] 04/28/2017 Yes    Heart transplanted [Z94.1] 03/25/2016 Not Applicable    Hypothyroidism (acquired) [E03.9] 07/16/2015 Yes      Problems Resolved During this Admission:    Diagnosis Date Noted Date Resolved POA      Discharged Condition: stable    Disposition: Home or Self Care    Follow Up:  Follow-up Information     [facility] 2 weeks.    Specialty:  Transplant  Contact information:  [address]  [facility] [address] [phone]  Additional information:  3rd floor               Patient Instructions:     Diet general   Order Specific Question Answer Comments   Fluid restriction: Fluid - 1500mL      Activity as tolerated     Call MD for:  temperature >100.4     Call MD for:  persistent nausea and vomiting or diarrhea     Call MD for:  severe uncontrolled pain     Call MD for:  redness, tenderness, or signs of infection (pain, swelling, redness, odor or green/yellow discharge around incision site)     Call MD for:  difficulty breathing or increased cough     Call MD for:  severe persistent headache     Call MD for:  worsening rash     Call MD for:  persistent dizziness, light-headedness, or visual disturbances     Call MD for:  increased confusion or weakness       Medications:  Reconciled Home Medications:   Discharge Medication List as of 8/10/2017 11:52 AM      START taking these medications    Details   amoxicillin-clavulanate 500-125mg (AUGMENTIN) 500-125 mg Tab Take 1 tablet (500 mg total) by mouth 3 (three) times daily., Starting Thu 8/10/2017, Until Sun 8/20/2017, Normal         CONTINUE these medications which have CHANGED    Details   sulfamethoxazole-trimethoprim 400-80mg (BACTRIM,SEPTRA) 400-80 mg per tablet Take 1 tablet by mouth once daily. Z94.1 heart replaced by transplant, Starting Thu 8/10/2017, No  Print      tacrolimus (PROGRAF) 1 MG Cap Take 1 capsule (1 mg total) by mouth every 12 (twelve) hours., Starting Thu 8/10/2017, No Print         CONTINUE these medications which have NOT CHANGED    Details   aspirin (ECOTRIN) 81 MG EC tablet Take 1 tablet (81 mg total) by mouth once daily., Starting Mon 4/4/2016, Until Mon 8/7/2017, Normal      atorvastatin (LIPITOR) 40 MG tablet Take 1 tablet (40 mg total) by mouth once daily., Starting 3/22/2017, Until Thu 3/22/18, Normal      everolimus (ZORTRESS) 0.5 mg tablet Take 2 tablets (1 mg total) by mouth 2 (two) times daily., Starting Tue 7/18/2017, Until Wed 7/18/2018, Normal      levothyroxine (SYNTHROID) 75 MCG tablet Take 1 tablet (75 mcg total) by mouth before breakfast., Starting 4/12/2017, Until Thu 4/12/18, Normal      lisinopril (PRINIVIL,ZESTRIL) 5 MG tablet Take 0.5 tablets (2.5 mg total) by mouth once daily., Starting Tue 6/6/2017, Normal      multivitamin (THERAGRAN) tablet Take 1 tablet by mouth once daily., Starting 4/4/2016, Until Discontinued, OTC      mycophenolate (CELLCEPT) 250 mg Cap Take 4 capsules (1,000 mg total) by mouth 2 (two) times daily., Starting 12/15/2016, Until Fri 12/15/17, Normal             Mae Leonard PA-C  Heart Transplant  [facility]

## 2017-08-15 NOTE — HOSPITAL COURSE
Patient was started on broad spectrum antibiotics, CT maxillofacial was obtained, and transplant ID was consulted. Patient clinically improved on broad spectrum abx and was switched to ampicillin-sulbactam, followed by PO augmentin with a plan for a 14 day course. She also was started on valtrex for oral HSV.

## 2017-08-16 ENCOUNTER — PATIENT MESSAGE (OUTPATIENT)
Dept: TRANSPLANT | Facility: CLINIC | Age: 62
End: 2017-08-16

## 2017-08-17 ENCOUNTER — TELEPHONE (OUTPATIENT)
Dept: TRANSPLANT | Facility: CLINIC | Age: 62
End: 2017-08-17

## 2017-08-17 NOTE — TELEPHONE ENCOUNTER
Chart Review     Everolimus  1mg BID   Cellcept 1000mg  BID   Tacro 1mg Q12H       Mouth sores , will refer ENT ( Apthous vulcers vs macrocystis )

## 2017-08-18 ENCOUNTER — TELEPHONE (OUTPATIENT)
Dept: TRANSPLANT | Facility: CLINIC | Age: 62
End: 2017-08-18

## 2017-08-18 DIAGNOSIS — K13.79 MOUTH SORES: Primary | ICD-10-CM

## 2017-08-18 DIAGNOSIS — Z79.899 ENCOUNTER FOR LONG-TERM (CURRENT) USE OF MEDICATIONS: ICD-10-CM

## 2017-08-18 DIAGNOSIS — Z94.1 STATUS POST HEART TRANSPLANT: ICD-10-CM

## 2017-08-18 NOTE — TELEPHONE ENCOUNTER
Spoke with pt about seeing ENT, She perfers to see here   Will need to see if she can reschedule our appts to the 29 th from the 28th.  Left a voice message on her phone asking what she would like to do.

## 2017-08-21 ENCOUNTER — PATIENT MESSAGE (OUTPATIENT)
Dept: TRANSPLANT | Facility: CLINIC | Age: 62
End: 2017-08-21

## 2017-08-21 ENCOUNTER — TELEPHONE (OUTPATIENT)
Dept: TRANSPLANT | Facility: CLINIC | Age: 62
End: 2017-08-21

## 2017-08-21 NOTE — TELEPHONE ENCOUNTER
Pt returned call. Informed her that the appt on 8/29/17 was no longer available. Instructed pt to see a local ENT. Pt verbalized understanding and stated that she will call to get an appt. Instructed pt to call back if she has any questions or concerns.

## 2017-08-21 NOTE — TELEPHONE ENCOUNTER
Called pt LM on  to call back when available. Informed pt per jenkins review, Dr. Negron does want pt to see ENT regarding mouth sores d/t persistant mouth sores over the past 2 months. Informed pt that we can try to reschedule her appt to Tuesday 8/29/17 on the same day available to see the ENT. Instructed pt to call back when available.           Neptali Hogan, please call me,  ms Boone left me a VM mentioning about me seeing a ENT at [facility] for my mouth sores.   And the only available date would be Tues Aug 29th . Day after my check up Mon Aug.   Fyi: today the original right side mouth sore is healing . Doesn't bother me, ; I do have another bottom left,   It's getting better.  Do I still need to see ENT dr?   Do I need to change my appt from  Mon to Tues.  I really need to know to tell my employer.   Thanks Cassandra

## 2017-08-22 ENCOUNTER — PATIENT MESSAGE (OUTPATIENT)
Dept: OTOLARYNGOLOGY | Facility: CLINIC | Age: 62
End: 2017-08-22

## 2017-08-22 RX ORDER — TACROLIMUS 1 MG/1
1 CAPSULE ORAL EVERY 12 HOURS
Qty: 240 CAPSULE | Refills: 4 | Status: SHIPPED | OUTPATIENT
Start: 2017-08-22 | End: 2018-09-17

## 2017-08-22 NOTE — TELEPHONE ENCOUNTER
----- Message from Angela Crawford sent at 8/21/2017  9:38 AM CDT -----  Regarding: tacrolimus prescription needed  Dickson Reyes Jorge,     We have not received the new prescription for the tacrolimus dose change from 8/10. Please send as soon as possible.     Thanks,   Angela   Outpatient Pharmacy

## 2017-08-23 ENCOUNTER — PATIENT MESSAGE (OUTPATIENT)
Dept: TRANSPLANT | Facility: CLINIC | Age: 62
End: 2017-08-23

## 2017-08-28 ENCOUNTER — PATIENT MESSAGE (OUTPATIENT)
Dept: TRANSPLANT | Facility: CLINIC | Age: 62
End: 2017-08-28

## 2017-08-31 ENCOUNTER — TELEPHONE (OUTPATIENT)
Dept: TRANSPLANT | Facility: CLINIC | Age: 62
End: 2017-08-31

## 2017-08-31 LAB
BNP (B-TYPE NATRIURETIC PEP): 82
EXT ALBUMIN: 3.6
EXT ALT: 34
EXT AST: 73
EXT BUN: 19
EXT CALCIUM: 8.9
EXT CHLORIDE: 106
EXT CREATININE: 0.8 MG/DL
EXT EVEROLIMUS LEVEL: 4
EXT GLUCOSE: 80
EXT HEMATOCRIT: 34.6
EXT HEMOGLOBIN: 11.2
EXT MAGNESIUM: 1.7
EXT PLATELETS: 213
EXT POTASSIUM: 4.6
EXT PROTEIN TOTAL: 6.5
EXT SODIUM: 137 MMOL/L
EXT TACROLIMUS LVL: 3.5
EXT WBC: 3.4

## 2017-08-31 NOTE — TELEPHONE ENCOUNTER
Received outside labs. All labs within goal range.        Pt is getting weekly labs and coming to clinic on 9/8/17 with labs and ECHO.     Any recs?

## 2017-09-03 ENCOUNTER — PATIENT MESSAGE (OUTPATIENT)
Dept: TRANSPLANT | Facility: CLINIC | Age: 62
End: 2017-09-03

## 2017-09-06 ENCOUNTER — TELEPHONE (OUTPATIENT)
Dept: TRANSPLANT | Facility: CLINIC | Age: 62
End: 2017-09-06

## 2017-09-06 LAB
EXT ALBUMIN: 3.5
EXT ALT: 23
EXT AST: 33
EXT BUN: 21
EXT CALCIUM: 9.1
EXT CHLORIDE: 106
EXT CREATININE: 0.84 MG/DL
EXT EVEROLIMUS LEVEL: 4.1
EXT GLUCOSE: 83
EXT HEMATOCRIT: 34.1
EXT HEMOGLOBIN: 11.4
EXT MAGNESIUM: 1.9
EXT PLATELETS: 189
EXT POTASSIUM: 4.9
EXT PROTEIN TOTAL: 6.7
EXT SODIUM: 139 MMOL/L
EXT TACROLIMUS LVL: 3.8
EXT WBC: 3.2

## 2017-09-06 NOTE — TELEPHONE ENCOUNTER
Called requesting everolimus level result. Per  verbal results for level was 4.1 goal is 3-8. Faxed report will be sent over. Confirmed fax number.

## 2017-09-08 ENCOUNTER — OFFICE VISIT (OUTPATIENT)
Dept: TRANSPLANT | Facility: CLINIC | Age: 62
End: 2017-09-08
Payer: COMMERCIAL

## 2017-09-08 ENCOUNTER — PATIENT MESSAGE (OUTPATIENT)
Dept: TRANSPLANT | Facility: CLINIC | Age: 62
End: 2017-09-08

## 2017-09-08 ENCOUNTER — HOSPITAL ENCOUNTER (OUTPATIENT)
Dept: CARDIOLOGY | Facility: CLINIC | Age: 62
Discharge: HOME | End: 2017-09-08
Attending: INTERNAL MEDICINE
Payer: COMMERCIAL

## 2017-09-08 ENCOUNTER — LAB VISIT (OUTPATIENT)
Dept: LAB | Facility: HOSPITAL | Age: 62
End: 2017-09-08
Attending: INTERNAL MEDICINE
Payer: COMMERCIAL

## 2017-09-08 VITALS
SYSTOLIC BLOOD PRESSURE: 155 MMHG | BODY MASS INDEX: 22.15 KG/M2 | HEART RATE: 98 BPM | HEIGHT: 63 IN | DIASTOLIC BLOOD PRESSURE: 86 MMHG | WEIGHT: 125 LBS

## 2017-09-08 DIAGNOSIS — Z94.1 STATUS POST HEART TRANSPLANT: ICD-10-CM

## 2017-09-08 DIAGNOSIS — Z79.899 ENCOUNTER FOR LONG-TERM (CURRENT) USE OF MEDICATIONS: ICD-10-CM

## 2017-09-08 DIAGNOSIS — Z79.60 LONG-TERM USE OF IMMUNOSUPPRESSANT MEDICATION: ICD-10-CM

## 2017-09-08 DIAGNOSIS — K13.79 RECURRENT ORAL ULCERS: ICD-10-CM

## 2017-09-08 DIAGNOSIS — I10 ESSENTIAL HYPERTENSION: ICD-10-CM

## 2017-09-08 DIAGNOSIS — Z94.1 HEART TRANSPLANTED: Primary | ICD-10-CM

## 2017-09-08 LAB
ALBUMIN SERPL BCP-MCNC: 3.6 G/DL
ALP SERPL-CCNC: 82 U/L
ALT SERPL W/O P-5'-P-CCNC: 24 U/L
ANION GAP SERPL CALC-SCNC: 10 MMOL/L
AST SERPL-CCNC: 36 U/L
BASOPHILS # BLD AUTO: 0.02 K/UL
BASOPHILS NFR BLD: 0.7 %
BILIRUB SERPL-MCNC: 0.6 MG/DL
BNP SERPL-MCNC: 77 PG/ML
BUN SERPL-MCNC: 17 MG/DL
CALCIUM SERPL-MCNC: 9.1 MG/DL
CHLORIDE SERPL-SCNC: 108 MMOL/L
CHOLEST SERPL-MCNC: 165 MG/DL
CHOLEST/HDLC SERPL: 2.4 {RATIO}
CLASS I ANTIBODY COMMENTS - LUMINEX: NORMAL
CLASS II ANTIBODY COMMENTS - LUMINEX: NORMAL
CO2 SERPL-SCNC: 23 MMOL/L
CREAT SERPL-MCNC: 0.9 MG/DL
DIASTOLIC DYSFUNCTION: NO
DIFFERENTIAL METHOD: ABNORMAL
DSA1 TESTING DATE: NORMAL
DSA12 TESTING DATE: NORMAL
DSA2 TESTING DATE: NORMAL
EOSINOPHIL # BLD AUTO: 0.1 K/UL
EOSINOPHIL NFR BLD: 3 %
ERYTHROCYTE [DISTWIDTH] IN BLOOD BY AUTOMATED COUNT: 14.6 %
EST. GFR  (AFRICAN AMERICAN): >60 ML/MIN/1.73 M^2
EST. GFR  (NON AFRICAN AMERICAN): >60 ML/MIN/1.73 M^2
ESTIMATED PA SYSTOLIC PRESSURE: 30.88
GLUCOSE SERPL-MCNC: 78 MG/DL
HCT VFR BLD AUTO: 35.4 %
HDLC SERPL-MCNC: 69 MG/DL
HDLC SERPL: 41.8 %
HGB BLD-MCNC: 11.1 G/DL
LDLC SERPL CALC-MCNC: 86.2 MG/DL
LYMPHOCYTES # BLD AUTO: 0.9 K/UL
LYMPHOCYTES NFR BLD: 28.3 %
MAGNESIUM SERPL-MCNC: 1.9 MG/DL
MCH RBC QN AUTO: 27.5 PG
MCHC RBC AUTO-ENTMCNC: 31.4 G/DL
MCV RBC AUTO: 88 FL
MONOCYTES # BLD AUTO: 0.6 K/UL
MONOCYTES NFR BLD: 18.8 %
NEUTROPHILS # BLD AUTO: 1.5 K/UL
NEUTROPHILS NFR BLD: 48.9 %
NONHDLC SERPL-MCNC: 96 MG/DL
PLATELET # BLD AUTO: 201 K/UL
PMV BLD AUTO: 10.3 FL
POTASSIUM SERPL-SCNC: 4.2 MMOL/L
PROT SERPL-MCNC: 7 G/DL
RBC # BLD AUTO: 4.04 M/UL
RETIRED EF AND QEF - SEE NOTES: 55 (ref 55–65)
SERUM COLLECTION DT - LUMINEX CLASS I: NORMAL
SERUM COLLECTION DT - LUMINEX CLASS II: NORMAL
SODIUM SERPL-SCNC: 141 MMOL/L
TACROLIMUS BLD-MCNC: 3.8 NG/ML
TRICUSPID VALVE REGURGITATION: NORMAL
TRIGL SERPL-MCNC: 49 MG/DL
WBC # BLD AUTO: 3.04 K/UL

## 2017-09-08 PROCEDURE — 86833 HLA CLASS II HIGH DEFIN QUAL: CPT | Mod: PO,TXP

## 2017-09-08 PROCEDURE — 80053 COMPREHEN METABOLIC PANEL: CPT

## 2017-09-08 PROCEDURE — 36415 COLL VENOUS BLD VENIPUNCTURE: CPT

## 2017-09-08 PROCEDURE — 3079F DIAST BP 80-89 MM HG: CPT | Mod: S$GLB,,, | Performed by: INTERNAL MEDICINE

## 2017-09-08 PROCEDURE — 86833 HLA CLASS II HIGH DEFIN QUAL: CPT | Mod: 91,PO,TXP

## 2017-09-08 PROCEDURE — 80061 LIPID PANEL: CPT

## 2017-09-08 PROCEDURE — 93306 TTE W/DOPPLER COMPLETE: CPT | Mod: S$GLB,,, | Performed by: INTERNAL MEDICINE

## 2017-09-08 PROCEDURE — 3077F SYST BP >= 140 MM HG: CPT | Mod: S$GLB,,, | Performed by: INTERNAL MEDICINE

## 2017-09-08 PROCEDURE — 80197 ASSAY OF TACROLIMUS: CPT

## 2017-09-08 PROCEDURE — 99999 PR PBB SHADOW E&M-EST. PATIENT-LVL III: CPT | Mod: PBBFAC,,, | Performed by: INTERNAL MEDICINE

## 2017-09-08 PROCEDURE — 86832 HLA CLASS I HIGH DEFIN QUAL: CPT | Mod: PO,TXP

## 2017-09-08 PROCEDURE — 99214 OFFICE O/P EST MOD 30 MIN: CPT | Mod: S$GLB,,, | Performed by: INTERNAL MEDICINE

## 2017-09-08 PROCEDURE — 85025 COMPLETE CBC W/AUTO DIFF WBC: CPT

## 2017-09-08 PROCEDURE — 83880 ASSAY OF NATRIURETIC PEPTIDE: CPT

## 2017-09-08 PROCEDURE — 86977 RBC SERUM PRETX INCUBJ/INHIB: CPT | Mod: 91,PO,TXP

## 2017-09-08 PROCEDURE — 83735 ASSAY OF MAGNESIUM: CPT

## 2017-09-08 PROCEDURE — 86832 HLA CLASS I HIGH DEFIN QUAL: CPT | Mod: 91,PO,TXP

## 2017-09-08 PROCEDURE — 3008F BODY MASS INDEX DOCD: CPT | Mod: S$GLB,,, | Performed by: INTERNAL MEDICINE

## 2017-09-08 NOTE — LETTER
September 8, 2017        Esha Tee  [address]  [place] [address]  Phone: [phone]  Fax: [phone]             [facility] [address] [place] [address]  Phone: [phone]   Patient: Cassandra Gonzales   MR Number: [id number]   YOB: 1955   Date of Visit: 9/8/2017       Dear Dr. Esha Tee    Thank you for referring Cassandra Gonzales to me for evaluation. Attached you will find relevant portions of my assessment and plan of care.    If you have questions, please do not hesitate to call me. I look forward to following Cassandra Gonzales along with you.    Sincerely,    Flip Mccoy MD    Enclosure    If you would like to receive this communication electronically, please contact [email or web address] or [phone] to request [employer] access.    [employer] is a tool which provides read-only access to select patient information with whom you have a relationship. Its easy to use and provides real time access to review your patients record including encounter summaries, notes, results, and demographic information.    If you feel you have received this communication in error or would no longer like to receive these types of communications, please e-mail [email or web address]

## 2017-09-08 NOTE — PROGRESS NOTES
Met with pt in clinic for 3 month follow up and post hospital follow up. Reviewed medications and reviewed plan to start nystatin and dexamethasone solution. Pt verbalized understanding and read back instructions. Pt labs are pending at present. Pt denies nausea, vomiting and diarrhea. PT scratch on right arm from puppy, healing. No redness, swelling and skin cool to touch. Pt voices idea to start a post heart transplant support group in [place]. Pt reports it is ok to give her name and number to pt's in the area that may want to talk or get together for a support group or that may need someone to talk to. Verbalized understanding. Informed pt that I will let the team know about possible support group or if there is a patient that we would like her to talk to for advice. Also, informed pt that we will send the prescription to the pharmacy and send a [facility] message with the directions per PharmD for the nystatin and dexamethasone mouth wash. Pt verbalized understanding.

## 2017-09-08 NOTE — PROGRESS NOTES
Subjective:   Ms. Gonzales is a 62 y.o. year old White female who received a  - brain death heart transplant on 3/25/16.      CMV status:   Donor: +  Recipient: -    HPI  Ms. Gonzales is a very pleasant 62 y.o. 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 She was asymptomatic and graft function was normal. She was given IV Solu Medrol 500 mg X 1. HTS staff and pathology staff reviewed the pathology, and there was no evidence of cellular necrosis, so she was sent home on a steroid pulse of Prednisone 30 mg bid X 2 days followed by 5 mg qd with EMBx done today likely to be followed by a slow taper. 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 She was started on Rapamune 2017 clinic visit. Since that visit she had an admission for mouth sores and facial swelling (-8/10) and she was d/c'd on Abx. She saw ENT who biopsied lesions showing just apthous ulcers thought likely due to IS medications. She completed her course of augmentin and Valcyte. She returns today for 3 month f/u and post-hospital f/u.    Today, she reports having a small ulcer on upper inside lip. She denies f/c, pain is minimal, no n/v/d, no swelling or other complaints. Overall she is doing well. She has no cardiopulmonary complaints. LV function is preserved by echo today.     Current Immuno regimen  Prograf 2/2  Cellcept 1000 mg BID  Everolimus   Off Prednisone.  Labs today are still pending.     Echo today:  1 - Normal left ventricular systolic function (EF 55-60%).     2 - Normal left ventricular diastolic function.     3 - Normal right ventricular systolic function .     4 - The estimated PA systolic pressure is  "31 mmHg.     5 - No wall motion abnormalities.     6 - Trivial tricuspid regurgitation.     7 - Trivial to mild pulmonic regurgitation.     Review of Systems   Constitution: Negative. Negative for chills, decreased appetite, diaphoresis, fever, weakness, malaise/fatigue, night sweats, weight gain and weight loss.   Eyes: Negative.    Cardiovascular: Negative for chest pain, claudication, cyanosis, dyspnea on exertion, irregular heartbeat, leg swelling, near-syncope, orthopnea, palpitations, paroxysmal nocturnal dyspnea and syncope.   Respiratory: Negative for cough, hemoptysis and shortness of breath.    Endocrine: Negative.    Hematologic/Lymphatic: Negative.    Skin: Negative for color change, dry skin and nail changes.   Musculoskeletal: Negative.    Gastrointestinal: Negative.    Genitourinary: Negative.        Objective:   Blood pressure (!) 155/86, pulse 98, height 5' 3" (1.6 m), weight 56.7 kg (125 lb), last menstrual period 03/01/2007.body mass index is 22.14 kg/m².    Physical Exam   Constitutional: She is oriented to person, place, and time. Vital signs are normal. She appears well-developed and well-nourished.   HENT:   Head: Normocephalic.   Tongue and lip ulcers   Eyes: Pupils are equal, round, and reactive to light.   Neck: Neck supple. No JVD present.   Cardiovascular: Normal rate, regular rhythm, normal heart sounds and intact distal pulses.  PMI is not displaced.  Exam reveals no gallop and no friction rub.    No murmur heard.  Pulmonary/Chest: Effort normal and breath sounds normal. No respiratory distress. She has no wheezes. She has no rales.   Abdominal: Soft. Bowel sounds are normal. She exhibits no distension. There is no tenderness. There is no rebound.   Musculoskeletal: She exhibits no edema.   Neurological: She is alert and oriented to person, place, and time.   Nursing note and vitals reviewed.      Lab Results   Component Value Date    WBC 3.04 (L) 09/08/2017    HGB 11.1 (L) 09/08/2017 "    HCT 35.4 (L) 09/08/2017    MCV 88 09/08/2017     09/08/2017    CO2 23 09/08/2017    CREATININE 0.9 09/08/2017    CALCIUM 9.1 09/08/2017    ALBUMIN 3.6 09/08/2017    AST 36 09/08/2017    BNP 77 09/08/2017    ALT 24 09/08/2017    ALLOMAP 33 03/22/2017       Lab Results   Component Value Date    INR 1.0 10/27/2016    INR 1.0 10/26/2016    INR 1.0 10/25/2016       BNP   Date Value Ref Range Status   09/08/2017 77 0 - 99 pg/mL Final     Comment:     Values of less than 100 pg/ml are consistent with non-CHF populations.   08/07/2017 86 0 - 99 pg/mL Final     Comment:     Values of less than 100 pg/ml are consistent with non-CHF populations.   03/22/2017 76 0 - 99 pg/mL Final     Comment:     Values of less than 100 pg/ml are consistent with non-CHF populations.       No results found for: LDH    Tacrolimus Lvl   Date Value Ref Range Status   08/10/2017 3.8 (L) 5.0 - 15.0 ng/mL Final     Comment:     Testing performed by Liquid Chromatography-Tandem  Mass Spectrometry (LC-MS/MS).  This test was developed and its performance characteristics  determined by [facility], Department of Pathology  and Laboratory Medicine in a manner consistent with CLIA  requirements. It has not been cleared or approved by the [place]  Food and Drug Administration.  This test is used for clinical   purposes.  It should not be regarded as investigational or for  research.       No results found for: SIROLIMUS  No results found for: CYCLOSPORINE    No results found for this or any previous visit.  No results found for this or any previous visit.    Labs were reviewed with the patient.    Assessment:     1. Heart transplanted    2. Essential hypertension    3. Status post heart transplant    4. Long-term use of immunosuppressant medication    5. Recurrent oral ulcers        Plan:   -Clinically doing well from a cardiopulmonary standpoint; Echo normal today  -Cont rapamune with low dose Tacro  -Monitor Rapa and Tacro levels.  Therapeutic today  -For ulcers, d/w with Transplant team and still start Dexamethasone drops with nystatin 3-4x daily for 1 month per pharmacology  - will discuss at chart review coming off Bactrim (completes one year this month).    Return instructions as set forth by post transplant schedule or as needed:    Clinic: Return for labs and/or biopsy weekly the first month, every two weeks during month 2 and then monthly for the first year at the provider or coordinator's discretion. During the second year, return to clinic every 3 months. Post transplant year 3-5 return every 6 months. There will be a comprehensive post transplant evaluation every year that may include LHC/RHC/biopsy, stress test, echo, CXR, and other health screening exams.    In addition to the clinical assessment, I have ordered Allomap testing for this patient to assist in identification of moderate/severe acute cellular rejection (ACR) in a pt with stable Allograft function instead of endomyocardial biopsy.     Patient is reminded to call with any health changes as these can be early signs of transplant complications. Patient is advised to make sure any new medications or changes of old medications are discussed with a pharmacist or physician knowledgeable with transplant to avoid rejection/drug toxicity related to significant drug interactions.    UNOS Patient Status  Functional Status: 100% - Normal, no complaints, no evidence of disease  Physical Capacity: No Limitations  Working for Income: Unknown    Flip Mccoy MD

## 2017-09-08 NOTE — TELEPHONE ENCOUNTER
Since the patient may gain significant benefits with everolimus, I would suggest this intervention (published in a case report of patients with mouth ulcers due to high dose EVL for cancer) first before reducing or stopping medication:     15 drops of dexamethasone 0.5mg/5mL oral solution mixed with 6mL of water.  Patient is to keep medication in mouth for 2-3 minutes, then expectorate.  Repeat procedure 3-4 times a day until either resolution of ulcers, or two weeks has passed.  To prevent thrush, would recommend Nystatin 5mL swish and spit after every administration of dexamethasone.       -Abhijeet     ----- Message -----   From: Samira Munson LPN   Sent: 9/6/2017   4:19 PM   To: Alyssa Negron MD, Abhijeet Mccray, PharmD   Subject: recurrent mouth ulcers                           As talked about today:     Mrs. 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 Per visit: Oral faint in discoloration of right anterior labial mucosa and minimal ulceration of left anterior lower lip and mild erythema of mid left lateral tongue.   Pt current immunosuppression:   Everolimus 1 mg BID   Tacro 1 mg BID   BActrim daily -   Cellcept 1000/1000   Lipitor 40   Lisinopril 2.5mg     Last level from out side labs Everolimus 4.1 (4.0, 4.1) Goal 3-8, and tacro 3.8 (3.5, 3.8)  goal 3-6.     Spoke with Dr. Negron regarding pt and issues since on rapa and everolimus and wanted to discuss with you your recommendations for patient.

## 2017-09-11 LAB — ALLOMAP TEST SCORE: 31

## 2017-09-11 RX ORDER — DEXAMETHASONE 0.5 MG/5ML
SOLUTION ORAL
Qty: 500 ML | Refills: 3 | Status: SHIPPED | OUTPATIENT
Start: 2017-09-11 | End: 2017-11-17 | Stop reason: SDUPTHER

## 2017-09-11 RX ORDER — NYSTATIN 100000 [USP'U]/ML
4 SUSPENSION ORAL 4 TIMES DAILY
Qty: 160 ML | Refills: 0 | Status: SHIPPED | OUTPATIENT
Start: 2017-09-11 | End: 2017-09-21

## 2017-09-12 ENCOUNTER — PATIENT MESSAGE (OUTPATIENT)
Dept: TRANSPLANT | Facility: CLINIC | Age: 62
End: 2017-09-12

## 2017-09-18 ENCOUNTER — PATIENT MESSAGE (OUTPATIENT)
Dept: TRANSPLANT | Facility: CLINIC | Age: 62
End: 2017-09-18

## 2017-09-18 LAB
C1Q1 TESTING DATE: NORMAL
C1Q1 TESTING DATE: NORMAL
C1Q2 TESTING DATE: NORMAL
CLASS I ANTIBODY COMMENTS - LUMINEX: NORMAL
CLASS II ANTIBODY COMMENTS - LUMINEX: NORMAL
SERUM COLLECTION DT - LUMINEX CLASS I: NORMAL
SERUM COLLECTION DT - LUMINEX CLASS II: NORMAL

## 2017-09-20 ENCOUNTER — PATIENT MESSAGE (OUTPATIENT)
Dept: TRANSPLANT | Facility: CLINIC | Age: 62
End: 2017-09-20

## 2017-09-22 ENCOUNTER — TELEPHONE (OUTPATIENT)
Dept: TRANSPLANT | Facility: CLINIC | Age: 62
End: 2017-09-22

## 2017-09-22 NOTE — TELEPHONE ENCOUNTER
Reviewed chart with Dr. Pacheco. Pt reports she has started on her dexamethasone drops and nystatin oral treatment for mouth sores. Pt reports one more mouth sore has appeared and will update the team on any other sores. Pt denies and c/o at present. Per review instructed pt to d/c bactrim and repeat labs next week and to notify team if she has any questions or concerns.

## 2017-10-02 ENCOUNTER — PATIENT MESSAGE (OUTPATIENT)
Dept: TRANSPLANT | Facility: CLINIC | Age: 62
End: 2017-10-02

## 2017-10-04 ENCOUNTER — TELEPHONE (OUTPATIENT)
Dept: TRANSPLANT | Facility: CLINIC | Age: 62
End: 2017-10-04

## 2017-10-04 LAB
BNP (B-TYPE NATRIURETIC PEP): 66
EXT ALBUMIN: 3.9
EXT ALT: 31
EXT AST: 35
EXT BUN: 22
EXT CALCIUM: 9.2
EXT CHLORIDE: 105
EXT CREATININE: 0.84 MG/DL
EXT GLUCOSE: 83
EXT HEMATOCRIT: 36
EXT HEMOGLOBIN: 11.5
EXT MAGNESIUM: 1.8
EXT PLATELETS: 200
EXT POTASSIUM: 4.2
EXT PROTEIN TOTAL: 7.1
EXT SIROLIMUS LVL: 3.7
EXT SODIUM: 139 MMOL/L
EXT TACROLIMUS LVL: 4.5
EXT WBC: 4.2

## 2017-10-12 ENCOUNTER — PATIENT MESSAGE (OUTPATIENT)
Dept: TRANSPLANT | Facility: CLINIC | Age: 62
End: 2017-10-12

## 2017-10-17 ENCOUNTER — TELEPHONE (OUTPATIENT)
Dept: TRANSPLANT | Facility: CLINIC | Age: 62
End: 2017-10-17

## 2017-10-17 ENCOUNTER — PATIENT MESSAGE (OUTPATIENT)
Dept: TRANSPLANT | Facility: CLINIC | Age: 62
End: 2017-10-17

## 2017-10-17 NOTE — TELEPHONE ENCOUNTER
Pt called to stated that her son was going to [facility] for a week and just wanted to make sure that there is nothing that she needs to be aware of with her sons vaccines. Informed pt that there are no vaccines that she will need to get since she will not be going to [facility] [place]note 2], but if her son returns home sick ; fever, cough, cold, then he needs to be seen by his doctor and make sure she wears her mask when around, handwashing and watch for any s/s of infection, fever, cough, SOB . Pt verbalized understanding.     Scheduled appts for follow up on 11/30/17

## 2017-10-30 ENCOUNTER — PATIENT MESSAGE (OUTPATIENT)
Dept: TRANSPLANT | Facility: CLINIC | Age: 62
End: 2017-10-30

## 2017-11-07 ENCOUNTER — PATIENT MESSAGE (OUTPATIENT)
Dept: TRANSPLANT | Facility: CLINIC | Age: 62
End: 2017-11-07

## 2017-11-08 ENCOUNTER — TELEPHONE (OUTPATIENT)
Dept: TRANSPLANT | Facility: CLINIC | Age: 62
End: 2017-11-08

## 2017-11-08 LAB
BNP (B-TYPE NATRIURETIC PEP): 22
EXT ALBUMIN: 3.6
EXT ALT: 24
EXT AST: 31
EXT BUN: 20
EXT CALCIUM: 8.7
EXT CHLORIDE: 105
EXT CREATININE: 0.81 MG/DL
EXT EVEROLIMUS LEVEL: 3.9
EXT GLUCOSE: 64
EXT HEMATOCRIT: 36.9
EXT HEMOGLOBIN: 12.1
EXT MAGNESIUM: 1.7
EXT PLATELETS: 167
EXT POTASSIUM: 5.4
EXT PROTEIN TOTAL: 6.5
EXT SODIUM: 138 MMOL/L
EXT TACROLIMUS LVL: 2.1
EXT WBC: 3.1

## 2017-11-08 NOTE — TELEPHONE ENCOUNTER
Received outpatient labs from local lab. Pt's Prograf level is 2.1 goal is 3-6, Everolimus level is 3.9 with a goal of 3-8. K+ level 5.4 and glucose is 64.     I will ask to get repeat labs and follow up with pt.   Called pt no answer. LM on  regarding lab results. Asked pt to get repeat labs and call back when available.

## 2017-11-10 ENCOUNTER — TELEPHONE (OUTPATIENT)
Dept: TRANSPLANT | Facility: CLINIC | Age: 62
End: 2017-11-10

## 2017-11-13 ENCOUNTER — TELEPHONE (OUTPATIENT)
Dept: TRANSPLANT | Facility: CLINIC | Age: 62
End: 2017-11-13

## 2017-11-13 LAB
BNP (B-TYPE NATRIURETIC PEP): 60
EXT ALBUMIN: 3.8
EXT ALT: 30
EXT AST: 40
EXT CALCIUM: 9.1
EXT CHLORIDE: 105
EXT EVEROLIMUS LEVEL: 4.2
EXT HEMATOCRIT: 37.4
EXT HEMOGLOBIN: 12.5
EXT MAGNESIUM: 1.9
EXT PLATELETS: 192
EXT POTASSIUM: 5.1
EXT PROTEIN TOTAL: 6.9
EXT SODIUM: 140 MMOL/L
EXT TACROLIMUS LVL: 3.6
EXT TACROLIMUS LVL: 3.6
EXT WBC: 3.8

## 2017-11-13 NOTE — TELEPHONE ENCOUNTER
Awaiting everolimus level. Spoke with pt and informed her of new lab results. Pt verbalized understanding, denies any c/o at present time. Instructed pt to call if she has any questions or concerns.

## 2017-11-17 ENCOUNTER — PATIENT MESSAGE (OUTPATIENT)
Dept: TRANSPLANT | Facility: CLINIC | Age: 62
End: 2017-11-17

## 2017-11-17 DIAGNOSIS — E78.5 HYPERLIPIDEMIA, UNSPECIFIED HYPERLIPIDEMIA TYPE: ICD-10-CM

## 2017-11-17 NOTE — TELEPHONE ENCOUNTER
Pt sent message stating that she has one mouth sore that began yesterday. Instructed pt to use mouth wash over the weekend to call on Monday if her symptoms have not resolved  . Pt verbalized understanding.

## 2017-11-18 RX ORDER — DEXAMETHASONE 0.5 MG/5ML
SOLUTION ORAL
Qty: 500 ML | Refills: 3 | OUTPATIENT
Start: 2017-11-18 | End: 2017-11-30

## 2017-11-27 ENCOUNTER — PATIENT MESSAGE (OUTPATIENT)
Dept: TRANSPLANT | Facility: CLINIC | Age: 62
End: 2017-11-27

## 2017-11-28 ENCOUNTER — PATIENT MESSAGE (OUTPATIENT)
Dept: TRANSPLANT | Facility: CLINIC | Age: 62
End: 2017-11-28

## 2017-11-30 ENCOUNTER — LAB VISIT (OUTPATIENT)
Dept: LAB | Facility: HOSPITAL | Age: 62
End: 2017-11-30
Payer: COMMERCIAL

## 2017-11-30 ENCOUNTER — OFFICE VISIT (OUTPATIENT)
Dept: TRANSPLANT | Facility: CLINIC | Age: 62
End: 2017-11-30
Payer: COMMERCIAL

## 2017-11-30 ENCOUNTER — HOSPITAL ENCOUNTER (OUTPATIENT)
Dept: CARDIOLOGY | Facility: CLINIC | Age: 62
Discharge: HOME | End: 2017-11-30
Attending: INTERNAL MEDICINE
Payer: COMMERCIAL

## 2017-11-30 VITALS
SYSTOLIC BLOOD PRESSURE: 136 MMHG | DIASTOLIC BLOOD PRESSURE: 81 MMHG | HEART RATE: 94 BPM | BODY MASS INDEX: 23.01 KG/M2 | HEIGHT: 63 IN | WEIGHT: 129.88 LBS

## 2017-11-30 DIAGNOSIS — Z79.60 LONG-TERM USE OF IMMUNOSUPPRESSANT MEDICATION: ICD-10-CM

## 2017-11-30 DIAGNOSIS — I10 ESSENTIAL HYPERTENSION: ICD-10-CM

## 2017-11-30 DIAGNOSIS — Z94.1 STATUS POST HEART TRANSPLANT: Primary | ICD-10-CM

## 2017-11-30 DIAGNOSIS — Z94.1 STATUS POST HEART TRANSPLANT: ICD-10-CM

## 2017-11-30 DIAGNOSIS — B25.8 OTHER CYTOMEGALOVIRAL DISEASES: ICD-10-CM

## 2017-11-30 DIAGNOSIS — Z79.899 ENCOUNTER FOR LONG-TERM (CURRENT) USE OF MEDICATIONS: ICD-10-CM

## 2017-11-30 DIAGNOSIS — E03.9 HYPOTHYROIDISM, UNSPECIFIED TYPE: ICD-10-CM

## 2017-11-30 DIAGNOSIS — T86.290 CARDIAC ALLOGRAFT VASCULOPATHY: ICD-10-CM

## 2017-11-30 DIAGNOSIS — Z94.1 HEART REPLACED BY TRANSPLANT: ICD-10-CM

## 2017-11-30 LAB
ALBUMIN SERPL BCP-MCNC: 3.7 G/DL
ALP SERPL-CCNC: 76 U/L
ALT SERPL W/O P-5'-P-CCNC: 23 U/L
ANION GAP SERPL CALC-SCNC: 11 MMOL/L
AST SERPL-CCNC: 33 U/L
BASOPHILS # BLD AUTO: 0.04 K/UL
BASOPHILS NFR BLD: 0.9 %
BILIRUB SERPL-MCNC: 0.4 MG/DL
BNP SERPL-MCNC: 56 PG/ML
BUN SERPL-MCNC: 23 MG/DL
CALCIUM SERPL-MCNC: 9 MG/DL
CHLORIDE SERPL-SCNC: 105 MMOL/L
CHOLEST SERPL-MCNC: 166 MG/DL
CHOLEST/HDLC SERPL: 2.4 {RATIO}
CO2 SERPL-SCNC: 23 MMOL/L
CREAT SERPL-MCNC: 0.8 MG/DL
DIFFERENTIAL METHOD: ABNORMAL
EOSINOPHIL # BLD AUTO: 0.1 K/UL
EOSINOPHIL NFR BLD: 1.3 %
ERYTHROCYTE [DISTWIDTH] IN BLOOD BY AUTOMATED COUNT: 13.6 %
EST. GFR  (AFRICAN AMERICAN): >60 ML/MIN/1.73 M^2
EST. GFR  (NON AFRICAN AMERICAN): >60 ML/MIN/1.73 M^2
GLUCOSE SERPL-MCNC: 84 MG/DL
HCT VFR BLD AUTO: 35.4 %
HDLC SERPL-MCNC: 69 MG/DL
HDLC SERPL: 41.6 %
HGB BLD-MCNC: 10.9 G/DL
IMM GRANULOCYTES # BLD AUTO: 0.01 K/UL
IMM GRANULOCYTES NFR BLD AUTO: 0.2 %
LDLC SERPL CALC-MCNC: 87.2 MG/DL
LYMPHOCYTES # BLD AUTO: 1.3 K/UL
LYMPHOCYTES NFR BLD: 28.7 %
MAGNESIUM SERPL-MCNC: 1.9 MG/DL
MCH RBC QN AUTO: 26.3 PG
MCHC RBC AUTO-ENTMCNC: 30.8 G/DL
MCV RBC AUTO: 86 FL
MONOCYTES # BLD AUTO: 0.6 K/UL
MONOCYTES NFR BLD: 12.6 %
NEUTROPHILS # BLD AUTO: 2.6 K/UL
NEUTROPHILS NFR BLD: 56.3 %
NONHDLC SERPL-MCNC: 97 MG/DL
NRBC BLD-RTO: 0 /100 WBC
PLATELET # BLD AUTO: 205 K/UL
PMV BLD AUTO: 9.9 FL
POTASSIUM SERPL-SCNC: 4 MMOL/L
PROT SERPL-MCNC: 7.1 G/DL
RBC # BLD AUTO: 4.14 M/UL
SODIUM SERPL-SCNC: 139 MMOL/L
T4 SERPL-MCNC: 11.7 UG/DL
TACROLIMUS BLD-MCNC: 4 NG/ML
TRIGL SERPL-MCNC: 49 MG/DL
TSH SERPL DL<=0.005 MIU/L-ACNC: 1.21 UIU/ML
WBC # BLD AUTO: 4.6 K/UL

## 2017-11-30 PROCEDURE — 99999 PR PBB SHADOW E&M-EST. PATIENT-LVL IV: CPT | Mod: PBBFAC,,, | Performed by: INTERNAL MEDICINE

## 2017-11-30 PROCEDURE — 84436 ASSAY OF TOTAL THYROXINE: CPT

## 2017-11-30 PROCEDURE — 80061 LIPID PANEL: CPT

## 2017-11-30 PROCEDURE — 99214 OFFICE O/P EST MOD 30 MIN: CPT | Mod: S$GLB,,, | Performed by: INTERNAL MEDICINE

## 2017-11-30 PROCEDURE — 80197 ASSAY OF TACROLIMUS: CPT

## 2017-11-30 PROCEDURE — 84443 ASSAY THYROID STIM HORMONE: CPT

## 2017-11-30 PROCEDURE — 86832 HLA CLASS I HIGH DEFIN QUAL: CPT | Mod: PO,TXP

## 2017-11-30 PROCEDURE — 86833 HLA CLASS II HIGH DEFIN QUAL: CPT | Mod: 91,PO,NTX

## 2017-11-30 PROCEDURE — 86833 HLA CLASS II HIGH DEFIN QUAL: CPT | Mod: PO,TXP

## 2017-11-30 PROCEDURE — 80169 DRUG ASSAY EVEROLIMUS: CPT

## 2017-11-30 PROCEDURE — 86832 HLA CLASS I HIGH DEFIN QUAL: CPT | Mod: 91,PO,TXP

## 2017-11-30 PROCEDURE — 86977 RBC SERUM PRETX INCUBJ/INHIB: CPT | Mod: 91,PO,TXP

## 2017-11-30 PROCEDURE — 93306 TTE W/DOPPLER COMPLETE: CPT | Mod: S$GLB,,, | Performed by: INTERNAL MEDICINE

## 2017-11-30 PROCEDURE — 85025 COMPLETE CBC W/AUTO DIFF WBC: CPT

## 2017-11-30 PROCEDURE — 83880 ASSAY OF NATRIURETIC PEPTIDE: CPT

## 2017-11-30 PROCEDURE — 80053 COMPREHEN METABOLIC PANEL: CPT

## 2017-11-30 PROCEDURE — 83735 ASSAY OF MAGNESIUM: CPT

## 2017-11-30 RX ORDER — AMOXICILLIN 500 MG/1
500 CAPSULE ORAL
COMMUNITY
Start: 2017-10-15

## 2017-11-30 RX ORDER — NYSTATIN 100000 [USP'U]/ML
4 SUSPENSION ORAL 4 TIMES DAILY PRN
COMMUNITY
Start: 2017-11-30 | End: 2018-06-22 | Stop reason: SDUPTHER

## 2017-11-30 NOTE — LETTER
November 30, 2017        Esha Tee  [address]  [place] [address]  Phone: [phone]  Fax: [phone]             [facility] [address] [place] [address]  Phone: [phone]   Patient: Cassandra Gonzales   MR Number: [id number]   YOB: 1955   Date of Visit: 11/30/2017       Dear Dr. Esha Tee    Thank you for referring Cassandra Gonzales to me for evaluation. Attached you will find relevant portions of my assessment and plan of care.    If you have questions, please do not hesitate to call me. I look forward to following Cassandra Gonzales along with you.    Sincerely,    Darren Brunson MD    Enclosure    If you would like to receive this communication electronically, please contact [email or web address] or [phone] to request [employer] access.    [employer] is a tool which provides read-only access to select patient information with whom you have a relationship. Its easy to use and provides real time access to review your patients record including encounter summaries, notes, results, and demographic information.    If you feel you have received this communication in error or would no longer like to receive these types of communications, please e-mail [email or web address]

## 2017-11-30 NOTE — PROGRESS NOTES
Subjective:   Ms. Gonzales is a 62 y.o. year old White female who received a  - brain death heart transplant on 3/25/16.      CMV status:   Donor: +  Recipient: -    HPI  Ms. Gonzales is a very pleasant 62 y.o. 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 She was asymptomatic and graft function was normal. She was given IV Solu Medrol 500 mg X 1. HTS staff and pathology staff reviewed the pathology, and there was no evidence of cellular necrosis, so she was sent home on a steroid pulse of Prednisone 30 mg bid X 2 days followed by 5 mg qd with EMBx done today likely to be followed by a slow taper. 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 She was started on Rapamune 2017 clinic visit. Since that visit she had an admission for mouth sores and facial swelling (-8/10) and she was d/c'd on Abx. She saw ENT who biopsied lesions showing just apthous ulcers thought likely due to IS medications. She completed her course of augmentin and Valcyte. She returns today for a regular follow-up. Doing really well. Has no complaints. Immuno regimen includes; Prograf 2/2, Cellcept 1000 mg BID, Everolimus   Off Prednisone.     2D Echo with CFD done today  - Post-cardiac transplantation study.   - No wall motion abnormalities.   - Normal left ventricular systolic function (EF 55-60%).   - Normal left ventricular diastolic function.   - Normal right ventricular systolic function .   - Pulmonary hypertension. The estimated PA systolic pressure is 48 mmHg.   - Mild tricuspid regurgitation.     Review of Systems   Constitution: Negative. Negative for chills, decreased appetite, diaphoresis, fever,  "weakness, malaise/fatigue, night sweats, weight gain and weight loss.   Eyes: Negative.    Cardiovascular: Negative for chest pain, claudication, cyanosis, dyspnea on exertion, irregular heartbeat, leg swelling, near-syncope, orthopnea, palpitations, paroxysmal nocturnal dyspnea and syncope.   Respiratory: Negative for cough, hemoptysis and shortness of breath.    Endocrine: Negative.    Hematologic/Lymphatic: Negative.    Skin: Negative for color change, dry skin and nail changes.   Musculoskeletal: Negative.    Gastrointestinal: Negative.    Genitourinary: Negative.        Objective:   Blood pressure 136/81, pulse 94, height 5' 3" (1.6 m), weight 58.9 kg (129 lb 13.6 oz), last menstrual period 03/01/2007.body mass index is 23 kg/m².    Physical Exam   Constitutional: She is oriented to person, place, and time. Vital signs are normal. She appears well-developed and well-nourished.   HENT:   Head: Normocephalic.   Eyes: Pupils are equal, round, and reactive to light.   Neck: Neck supple. No JVD present.   Cardiovascular: Regular rhythm, normal heart sounds and intact distal pulses.  Tachycardia present.  PMI is not displaced.  Exam reveals no gallop and no friction rub.    No murmur heard.  Pulmonary/Chest: Effort normal and breath sounds normal. No respiratory distress. She has no wheezes. She has no rales.   Abdominal: Soft. Bowel sounds are normal. She exhibits no distension. There is no tenderness. There is no rebound.   Musculoskeletal: She exhibits no edema.   Neurological: She is alert and oriented to person, place, and time.   Nursing note and vitals reviewed.      Lab Results   Component Value Date    WBC 4.60 11/30/2017    HGB 10.9 (L) 11/30/2017    HCT 35.4 (L) 11/30/2017    MCV 86 11/30/2017     11/30/2017    CO2 23 11/30/2017    CREATININE 0.8 11/30/2017    CALCIUM 9.0 11/30/2017    ALBUMIN 3.7 11/30/2017    AST 33 11/30/2017    BNP 56 11/30/2017    ALT 23 11/30/2017    ALLOMAP 31 09/08/2017 "       Lab Results   Component Value Date    INR 1.0 10/27/2016    INR 1.0 10/26/2016    INR 1.0 10/25/2016       BNP   Date Value Ref Range Status   11/30/2017 56 0 - 99 pg/mL Final     Comment:     Values of less than 100 pg/ml are consistent with non-CHF populations.   09/08/2017 77 0 - 99 pg/mL Final     Comment:     Values of less than 100 pg/ml are consistent with non-CHF populations.   08/07/2017 86 0 - 99 pg/mL Final     Comment:     Values of less than 100 pg/ml are consistent with non-CHF populations.       No results found for: LDH    Tacrolimus Lvl   Date Value Ref Range Status   09/08/2017 3.8 (L) 5.0 - 15.0 ng/mL Final     Comment:     Testing performed by Liquid Chromatography-Tandem  Mass Spectrometry (LC-MS/MS).  This test was developed and its performance characteristics  determined by [facility], Department of Pathology  and Laboratory Medicine in a manner consistent with CLIA  requirements. It has not been cleared or approved by the [place]  Food and Drug Administration.  This test is used for clinical   purposes.  It should not be regarded as investigational or for  research.         Assessment:     1. Status post heart transplant    2. Long-term use of immunosuppressant medication    3. Essential hypertension    4. Other cytomegaloviral diseases    5. Cardiac allograft vasculopathy        Plan:   Doing well  Continue current immuno regimen  Follow Tacro and Everolimus levels   Return instructions as set forth by post transplant schedule or as needed:    Clinic: Return for labs and/or biopsy weekly the first month, every two weeks during month 2 and then monthly for the first year at the provider or coordinator's discretion. During the second year, return to clinic every 3 months. Post transplant year 3-5 return every 6 months. There will be a comprehensive post transplant evaluation every year that may include LHC/RHC/biopsy, stress test, echo, CXR, and other health screening exams.    In  addition to the clinical assessment, I have ordered Allomap testing for this patient to assist in identification of moderate/severe acute cellular rejection (ACR) in a pt with stable Allograft function instead of endomyocardial biopsy.     Patient is reminded to call with any health changes as these can be early signs of transplant complications. Patient is advised to make sure any new medications or changes of old medications are discussed with a pharmacist or physician knowledgeable with transplant to avoid rejection/drug toxicity related to significant drug interactions.    UNOS Patient Status  Functional Status: 100% - Normal, no complaints, no evidence of disease  Physical Capacity: No Limitations  Working for Income: Unknown    Darren Brunson MD

## 2017-12-01 LAB — EVEROLIMUS BLD-MCNC: 4.3 NG/ML

## 2017-12-04 ENCOUNTER — PATIENT MESSAGE (OUTPATIENT)
Dept: TRANSPLANT | Facility: CLINIC | Age: 62
End: 2017-12-04

## 2017-12-04 LAB
ALLOMAP TEST SCORE: NORMAL
C1Q1 TESTING DATE: NORMAL
C1Q1 TESTING DATE: NORMAL
C1Q2 TESTING DATE: NORMAL
CLASS I ANTIBODY COMMENTS - LUMINEX: NORMAL
CLASS I ANTIBODY COMMENTS - LUMINEX: NORMAL
CLASS II ANTIBODY COMMENTS - LUMINEX: NORMAL
CLASS II ANTIBODY COMMENTS - LUMINEX: NORMAL
DSA1 TESTING DATE: NORMAL
DSA12 TESTING DATE: NORMAL
DSA2 TESTING DATE: NORMAL
SERUM COLLECTION DT - LUMINEX CLASS I: NORMAL
SERUM COLLECTION DT - LUMINEX CLASS I: NORMAL
SERUM COLLECTION DT - LUMINEX CLASS II: NORMAL
SERUM COLLECTION DT - LUMINEX CLASS II: NORMAL

## 2017-12-05 ENCOUNTER — TELEPHONE (OUTPATIENT)
Dept: TRANSPLANT | Facility: CLINIC | Age: 62
End: 2017-12-05

## 2017-12-07 ENCOUNTER — TELEPHONE (OUTPATIENT)
Dept: TRANSPLANT | Facility: CLINIC | Age: 62
End: 2017-12-07

## 2017-12-07 NOTE — TELEPHONE ENCOUNTER
Chart Review    Program 1/1 Level 4  Everolimus 1mg BID  Level 4.3 ( level 3-8 )   Cellcept 1000/1000mg   Alomap 33     WBC 4.6     Echo 55%   Lipids reviewed     Due for annual in march   Dexa scan

## 2017-12-08 ENCOUNTER — PATIENT MESSAGE (OUTPATIENT)
Dept: TRANSPLANT | Facility: CLINIC | Age: 62
End: 2017-12-08

## 2017-12-14 ENCOUNTER — TELEPHONE (OUTPATIENT)
Dept: TRANSPLANT | Facility: CLINIC | Age: 62
End: 2017-12-14

## 2017-12-14 ENCOUNTER — PATIENT MESSAGE (OUTPATIENT)
Dept: TRANSPLANT | Facility: CLINIC | Age: 62
End: 2017-12-14

## 2017-12-15 ENCOUNTER — PATIENT MESSAGE (OUTPATIENT)
Dept: TRANSPLANT | Facility: CLINIC | Age: 62
End: 2017-12-15

## 2017-12-15 RX ORDER — MYCOPHENOLATE MOFETIL 250 MG/1
1000 CAPSULE ORAL 2 TIMES DAILY
Qty: 240 CAPSULE | Refills: 11 | Status: SHIPPED | OUTPATIENT
Start: 2017-12-15 | End: 2018-12-13

## 2017-12-18 ENCOUNTER — PATIENT MESSAGE (OUTPATIENT)
Dept: TRANSPLANT | Facility: CLINIC | Age: 62
End: 2017-12-18

## 2017-12-27 ENCOUNTER — PATIENT MESSAGE (OUTPATIENT)
Dept: TRANSPLANT | Facility: CLINIC | Age: 62
End: 2017-12-27

## 2017-12-28 ENCOUNTER — PATIENT MESSAGE (OUTPATIENT)
Dept: TRANSPLANT | Facility: CLINIC | Age: 62
End: 2017-12-28

## 2018-01-02 ENCOUNTER — PATIENT MESSAGE (OUTPATIENT)
Dept: TRANSPLANT | Facility: CLINIC | Age: 63
End: 2018-01-02

## 2018-01-10 ENCOUNTER — PATIENT MESSAGE (OUTPATIENT)
Dept: TRANSPLANT | Facility: CLINIC | Age: 63
End: 2018-01-10

## 2018-01-11 ENCOUNTER — TELEPHONE (OUTPATIENT)
Dept: TRANSPLANT | Facility: CLINIC | Age: 63
End: 2018-01-11

## 2018-01-15 ENCOUNTER — PATIENT MESSAGE (OUTPATIENT)
Dept: TRANSPLANT | Facility: CLINIC | Age: 63
End: 2018-01-15

## 2018-01-16 ENCOUNTER — PATIENT MESSAGE (OUTPATIENT)
Dept: TRANSPLANT | Facility: CLINIC | Age: 63
End: 2018-01-16

## 2018-01-16 DIAGNOSIS — Z94.1 HEART REPLACED BY TRANSPLANT: Primary | ICD-10-CM

## 2018-01-18 ENCOUNTER — TELEPHONE (OUTPATIENT)
Dept: TRANSPLANT | Facility: CLINIC | Age: 63
End: 2018-01-18

## 2018-01-18 LAB
BNP (B-TYPE NATRIURETIC PEP): 54
EXT ALBUMIN: 3.5
EXT ALT: 25
EXT AST: 34
EXT CALCIUM: 8.9
EXT CHLORIDE: 105
EXT POTASSIUM: 4.9
EXT PROTEIN TOTAL: 6.7
EXT SODIUM: 139 MMOL/L
EXT TACROLIMUS LVL: 4.1

## 2018-02-12 ENCOUNTER — TELEPHONE (OUTPATIENT)
Dept: TRANSPLANT | Facility: CLINIC | Age: 63
End: 2018-02-12

## 2018-02-16 ENCOUNTER — TELEPHONE (OUTPATIENT)
Dept: TRANSPLANT | Facility: CLINIC | Age: 63
End: 2018-02-16

## 2018-02-16 NOTE — TELEPHONE ENCOUNTER
Reviewed pt's chart in chart review. Per review . Pt's dexa scan shows osteoporosis. Pt instructed to get a Vit-D level, 25 hydroxyl level and once it returns to start on vit d regimen.     Called pt no answer. Lm on  regarding getting a Vit-D level next week. Informed pt that I have sent over a lab order slip - standing to her local lab. Instructed pt to call back if she has any questions or concerns.

## 2018-02-19 ENCOUNTER — PATIENT MESSAGE (OUTPATIENT)
Dept: TRANSPLANT | Facility: CLINIC | Age: 63
End: 2018-02-19

## 2018-02-21 ENCOUNTER — PATIENT MESSAGE (OUTPATIENT)
Dept: TRANSPLANT | Facility: CLINIC | Age: 63
End: 2018-02-21

## 2018-02-22 ENCOUNTER — CONFERENCE (OUTPATIENT)
Dept: TRANSPLANT | Facility: CLINIC | Age: 63
End: 2018-02-22

## 2018-02-22 NOTE — TELEPHONE ENCOUNTER
Labs revealed in light of recent DEXA showing osteoporosis.  -Vit D: 72, at goal; will start maintenance Vit D with calcium  -will start 5mg IV Zolendronic acid yearly with f/u per protocol  -will also need thyroid studies given hypothyroid in 11/2017    ANUM Mccoy MD  Transplant Cardiology

## 2018-02-23 LAB
Lab: 72
VITAMIN D, 25-OH, TOTAL: 38.7

## 2018-02-23 RX ORDER — CALCIUM CARBONATE/VITAMIN D3 600MG-5MCG
1 TABLET ORAL ONCE
Refills: 0 | COMMUNITY
Start: 2018-02-23 | End: 2018-06-22 | Stop reason: SDUPTHER

## 2018-02-23 NOTE — TELEPHONE ENCOUNTER
Reviewed in chart review with Dr. Mccoy and virgie Mccray, PharmD.   Per chart review pt can get IV zoldronic acid 5 mg once a year and start John-vit D 600-800.

## 2018-02-27 ENCOUNTER — TELEPHONE (OUTPATIENT)
Dept: TRANSPLANT | Facility: CLINIC | Age: 63
End: 2018-02-27

## 2018-02-27 NOTE — TELEPHONE ENCOUNTER
Returned call to pt regarding directions for Calcium and vitamin d. Instructed pt to call back when available.

## 2018-02-28 ENCOUNTER — TELEPHONE (OUTPATIENT)
Dept: TRANSPLANT | Facility: CLINIC | Age: 63
End: 2018-02-28

## 2018-03-14 ENCOUNTER — PATIENT MESSAGE (OUTPATIENT)
Dept: TRANSPLANT | Facility: CLINIC | Age: 63
End: 2018-03-14

## 2018-03-15 ENCOUNTER — OFFICE VISIT (OUTPATIENT)
Dept: CARDIOLOGY | Facility: CLINIC | Age: 63
End: 2018-03-15
Payer: COMMERCIAL

## 2018-03-15 ENCOUNTER — HOSPITAL ENCOUNTER (OUTPATIENT)
Dept: CARDIOLOGY | Facility: CLINIC | Age: 63
Discharge: HOME | End: 2018-03-15
Attending: INTERNAL MEDICINE
Payer: COMMERCIAL

## 2018-03-15 ENCOUNTER — OFFICE VISIT (OUTPATIENT)
Dept: TRANSPLANT | Facility: CLINIC | Age: 63
End: 2018-03-15
Payer: COMMERCIAL

## 2018-03-15 ENCOUNTER — HOSPITAL ENCOUNTER (OUTPATIENT)
Dept: RADIOLOGY | Facility: HOSPITAL | Age: 63
Discharge: HOME | End: 2018-03-15
Attending: INTERNAL MEDICINE
Payer: COMMERCIAL

## 2018-03-15 ENCOUNTER — DOCUMENTATION ONLY (OUTPATIENT)
Dept: CARDIOLOGY | Facility: CLINIC | Age: 63
End: 2018-03-15

## 2018-03-15 VITALS
HEART RATE: 99 BPM | WEIGHT: 125.25 LBS | DIASTOLIC BLOOD PRESSURE: 77 MMHG | OXYGEN SATURATION: 98 % | BODY MASS INDEX: 22.19 KG/M2 | SYSTOLIC BLOOD PRESSURE: 133 MMHG | HEIGHT: 63 IN

## 2018-03-15 VITALS
HEART RATE: 98 BPM | WEIGHT: 125.44 LBS | BODY MASS INDEX: 22.23 KG/M2 | HEIGHT: 63 IN | DIASTOLIC BLOOD PRESSURE: 73 MMHG | SYSTOLIC BLOOD PRESSURE: 140 MMHG

## 2018-03-15 DIAGNOSIS — T86.290 CARDIAC ALLOGRAFT VASCULOPATHY: ICD-10-CM

## 2018-03-15 DIAGNOSIS — E78.5 DYSLIPIDEMIA: ICD-10-CM

## 2018-03-15 DIAGNOSIS — Z94.1 HEART REPLACED BY TRANSPLANT: ICD-10-CM

## 2018-03-15 DIAGNOSIS — I10 ESSENTIAL HYPERTENSION: Primary | ICD-10-CM

## 2018-03-15 DIAGNOSIS — Z94.1 HEART TRANSPLANTED: Primary | ICD-10-CM

## 2018-03-15 DIAGNOSIS — I10 ESSENTIAL HYPERTENSION: ICD-10-CM

## 2018-03-15 DIAGNOSIS — E78.5 HYPERLIPIDEMIA, UNSPECIFIED HYPERLIPIDEMIA TYPE: ICD-10-CM

## 2018-03-15 DIAGNOSIS — Z94.1 STATUS POST HEART TRANSPLANT: ICD-10-CM

## 2018-03-15 DIAGNOSIS — K13.79 RECURRENT ORAL ULCERS: ICD-10-CM

## 2018-03-15 DIAGNOSIS — E03.9 HYPOTHYROIDISM (ACQUIRED): ICD-10-CM

## 2018-03-15 DIAGNOSIS — Z79.60 LONG-TERM USE OF IMMUNOSUPPRESSANT MEDICATION: ICD-10-CM

## 2018-03-15 LAB
DIASTOLIC DYSFUNCTION: NO
ESTIMATED PA SYSTOLIC PRESSURE: 26.42
RETIRED EF AND QEF - SEE NOTES: 60 (ref 55–65)
TRICUSPID VALVE REGURGITATION: NORMAL

## 2018-03-15 PROCEDURE — 3078F DIAST BP <80 MM HG: CPT | Mod: CPTII,S$GLB,, | Performed by: INTERNAL MEDICINE

## 2018-03-15 PROCEDURE — 99214 OFFICE O/P EST MOD 30 MIN: CPT | Mod: S$GLB,,, | Performed by: INTERNAL MEDICINE

## 2018-03-15 PROCEDURE — 93306 TTE W/DOPPLER COMPLETE: CPT | Mod: S$GLB,,, | Performed by: INTERNAL MEDICINE

## 2018-03-15 PROCEDURE — 3075F SYST BP GE 130 - 139MM HG: CPT | Mod: CPTII,S$GLB,, | Performed by: INTERNAL MEDICINE

## 2018-03-15 PROCEDURE — 71046 X-RAY EXAM CHEST 2 VIEWS: CPT | Mod: TC

## 2018-03-15 PROCEDURE — 3077F SYST BP >= 140 MM HG: CPT | Mod: CPTII,S$GLB,, | Performed by: INTERNAL MEDICINE

## 2018-03-15 PROCEDURE — 99999 PR PBB SHADOW E&M-EST. PATIENT-LVL III: CPT | Mod: PBBFAC,,, | Performed by: INTERNAL MEDICINE

## 2018-03-15 PROCEDURE — 71046 X-RAY EXAM CHEST 2 VIEWS: CPT | Mod: 26,,, | Performed by: RADIOLOGY

## 2018-03-15 PROCEDURE — 99213 OFFICE O/P EST LOW 20 MIN: CPT | Mod: S$GLB,,, | Performed by: INTERNAL MEDICINE

## 2018-03-15 RX ORDER — ATORVASTATIN CALCIUM 40 MG/1
40 TABLET, FILM COATED ORAL DAILY
Qty: 30 TABLET | Refills: 11 | Status: SHIPPED | OUTPATIENT
Start: 2018-03-15 | End: 2018-03-15 | Stop reason: SDUPTHER

## 2018-03-15 RX ORDER — ATORVASTATIN CALCIUM 40 MG/1
40 TABLET, FILM COATED ORAL DAILY
Qty: 30 TABLET | Refills: 11 | Status: SHIPPED | OUTPATIENT
Start: 2018-03-15 | End: 2019-03-11

## 2018-03-15 RX ORDER — ALENDRONATE SODIUM 70 MG/1
70 TABLET ORAL
Qty: 51 TABLET | Refills: 0 | Status: SHIPPED | OUTPATIENT
Start: 2018-03-15 | End: 2018-06-22 | Stop reason: SDUPTHER

## 2018-03-15 RX ORDER — LISINOPRIL 5 MG/1
2.5 TABLET ORAL DAILY
Qty: 90 TABLET | Refills: 3 | Status: SHIPPED | OUTPATIENT
Start: 2018-03-15 | End: 2018-04-03 | Stop reason: SDUPTHER

## 2018-03-15 NOTE — PROGRESS NOTES
Subjective:   Ms. Gonzales is a 62 y.o. year old White female who received a  - brain death heart transplant on 3/25/16.       CMV status:   Donor: +  Recipient: -       HPI  Ms. Gonzales is a very pleasant 62 y.o. 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 She was asymptomatic and graft function was normal. She was given IV Solu Medrol 500 mg X 1. HTS staff and pathology staff reviewed the pathology, and there was no evidence of cellular necrosis, so she was sent home on a steroid pulse of Prednisone 30 mg bid X 2 days followed by 5 mg qd with EMBx done today likely to be followed by a slow taper. 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 She was started on Rapamune 2017 clinic visit. Since that visit she had an admission for mouth sores and facial swelling (-8/10) and she was d/c'd on Abx. She saw ENT who biopsied lesions showing just apthous ulcers thought likely due to IS medications.  She  today for her 2 year post-Tx visit. Doing really well. Has no complaints. Immuno regimen includes; Prograf , Cellcept 1000 mg BID, Everolimus Off Prednisone. Most recent eho showed preserved graft fucntion.     Review of Systems   Constitution: Negative. Negative for chills, decreased appetite, diaphoresis, fever, weakness, malaise/fatigue, night sweats, weight gain and weight loss.   Eyes: Negative.    Cardiovascular: Negative for chest pain, claudication, cyanosis, dyspnea on exertion, irregular heartbeat, leg swelling, near-syncope, orthopnea, palpitations, paroxysmal nocturnal dyspnea and syncope.   Respiratory: Negative for cough, hemoptysis and shortness of breath.  "   Endocrine: Negative.    Hematologic/Lymphatic: Negative.    Skin: Negative for color change, dry skin and nail changes.   Musculoskeletal: Negative.    Gastrointestinal: Negative.    Genitourinary: Negative.        Objective:   Blood pressure (!) 140/73, pulse 98, height 5' 3" (1.6 m), weight 56.9 kg (125 lb 7.1 oz), last menstrual period 03/01/2007.body mass index is 22.22 kg/m².    Physical Exam   Constitutional: She is oriented to person, place, and time. Vital signs are normal. She appears well-developed and well-nourished.   HENT:   Head: Normocephalic.   Eyes: Pupils are equal, round, and reactive to light.   Neck: Neck supple. No JVD present.   Cardiovascular: Normal rate, regular rhythm, normal heart sounds and intact distal pulses.  PMI is not displaced.  Exam reveals no gallop and no friction rub.    No murmur heard.  Pulmonary/Chest: Effort normal and breath sounds normal. No respiratory distress. She has no wheezes. She has no rales.   Abdominal: Soft. Bowel sounds are normal. She exhibits no distension. There is no tenderness. There is no rebound.   Musculoskeletal: She exhibits no edema.   Neurological: She is alert and oriented to person, place, and time.   Nursing note and vitals reviewed.      Lab Results   Component Value Date    WBC 3.32 (L) 03/15/2018    HGB 11.2 (L) 03/15/2018    HCT 35.9 (L) 03/15/2018    MCV 87 03/15/2018     03/15/2018    CO2 26 03/15/2018    CREATININE 0.8 03/15/2018    CALCIUM 9.5 03/15/2018    ALBUMIN 4.1 03/15/2018    AST 31 03/15/2018    BNP 63 03/15/2018    ALT 23 03/15/2018    ALLOMAP See result image under hyperlink 11/30/2017       Lab Results   Component Value Date    INR 1.0 10/27/2016    INR 1.0 10/26/2016    INR 1.0 10/25/2016       BNP   Date Value Ref Range Status   03/15/2018 63 0 - 99 pg/mL Final     Comment:     Values of less than 100 pg/ml are consistent with non-CHF populations.   11/30/2017 56 0 - 99 pg/mL Final     Comment:     Values of less " than 100 pg/ml are consistent with non-CHF populations.   09/08/2017 77 0 - 99 pg/mL Final     Comment:     Values of less than 100 pg/ml are consistent with non-CHF populations.       No results found for: LDH    Tacrolimus Lvl   Date Value Ref Range Status   11/30/2017 4.0 (L) 5.0 - 15.0 ng/mL Final     Comment:     Testing performed by Liquid Chromatography-Tandem  Mass Spectrometry (LC-MS/MS).  This test was developed and its performance characteristics  determined by [facility], Department of Pathology  and Laboratory Medicine in a manner consistent with CLIA  requirements. It has not been cleared or approved by the [place]  Food and Drug Administration.  This test is used for clinical   purposes.  It should not be regarded as investigational or for  research.       No results found for: SIROLIMUS  No results found for: CYCLOSPORINE    No results found for this or any previous visit.  No results found for this or any previous visit.    Labs were reviewed with the patient.    Assessment:     1. Heart transplanted    2. Cardiac allograft vasculopathy    3. Essential hypertension    4. Hyperlipidemia, unspecified hyperlipidemia type    5. Recurrent oral ulcers    6. Long-term use of immunosuppressant medication        Plan:   Doing well  Continue current immuno regimen  Follow Tacro and Everolimus levels   2D echo with CFD today  Has an appointment with DR. Baldomero Jacobsen for her annual angiogram  Refill for Atorvastatin was given     Return instructions as set forth by post transplant schedule or as needed:     Clinic: Return for labs and/or biopsy weekly the first month, every two weeks during month 2 and then monthly for the first year at the provider or coordinator's discretion. During the second year, return to clinic every 3 months. Post transplant year 3-5 return every 6 months. There will be a comprehensive post transplant evaluation every year that may include LHC/RHC/biopsy, stress test, echo, CXR,  and other health screening exams.     In addition to the clinical assessment, I have ordered Allomap testing for this patient to assist in identification of moderate/severe acute cellular rejection (ACR) in a pt with stable Allograft function instead of endomyocardial biopsy.      Patient is reminded to call with any health changes as these can be early signs of transplant complications. Patient is advised to make sure any new medications or changes of old medications are discussed with a pharmacist or physician knowledgeable with transplant to avoid rejection/drug toxicity related to significant drug interactions.     UNOS Patient Status  Functional Status: 100% - Normal, no complaints, no evidence of disease  Physical Capacity: No Limitations  Working for Income: Unknown     Darren Brunson MD

## 2018-03-15 NOTE — PROGRESS NOTES
OUTPATIENT CATHETERIZATION INSTRUCTIONS    You have been scheduled for a procedure in the catheterization lab on Friday, April 6, 2018.     Please report to the Cardiology Waiting Area on the Third floor of the hospital and check in at 7:30 AM.   You will then be taken to the SSCU (Short Stay Cardiac Unit) and prepared for your procedure. Please be aware that this is not the time of your procedure but the time you are to arrive. The procedures are scheduled on an hourly basis; however, emergency cases take precedence over all other cases.       You may not have anything to eat or drink after midnight the night before your test. You may take your regular morning medications with water. If there are any medications that you should not take you will be instructed to hold them that morning. If you are diabetic and on Metformin (Glucophage) do not take it the day before, the day of, and for 2 days after your procedure.      The procedure will take 1-2 hours to perform. After the procedure, you will return to SSCU on the third floor of the hospital. You will need to lie still (or keep your arm still) for the next 4 to 6 hours to minimize bleeding from the puncture site. Your family may remain in the room with you during this time.       You may be able to be discharged home that same afternoon if there is someone to drive you home and there were no complications. If you have one of the balloon, stent, or device procedures you may spend the night in the hospital. Your doctor will determine, based on your progress, the date and time of your discharge. The results of your procedure will be discussed with you before you are discharged. Any further testing or procedures will be scheduled for you either before you leave or you will be called with these appointments.       If you should have any questions, concerns, or need to change the date of your procedure, please call  SUSSY Gonzalez @ [phone]    Special  Instructions:  Drink plenty of water the day before and after the procedure.        THE ABOVE INSTRUCTIONS WERE GIVEN TO THE PATIENT VERBALLY AND THEY VERBALIZED UNDERSTANDING.  THEY DO NOT REQUIRE ANY SPECIAL NEEDS AND DO NOT HAVE ANY LEARNING BARRIERS.          Directions for Reporting to Cardiology Waiting Area in the Hospital  If you park in the Parking Garage:  Take elevators to the1st floor of the parking garage.  Continue past the gift shop, coffee shop, and piano.  Take a right and go to the gold elevators. (Elevator B)  Take the elevator to the 3rd floor.  Follow the arrow on the sign on the wall that says Cath Lab Registration/EP Lab Registration.  Follow the long hallway all the way around until you come to a big open area.  This is the registration area.  Check in at Reception Desk.    OR    If family is dropping you off:  Have them drop you off at the front of the Hospital under the green overhang.  Enter through the doors and take a right.  Take the E elevators to the 3rd floor Cardiology Waiting Area.  Check in at the Reception Desk in the waiting room.

## 2018-03-15 NOTE — LETTER
March 15, 2018        Esha Tee  [address]  [place] [address]  Phone: [phone]  Fax: [phone]             [facility] [address] [place] [address]  Phone: [phone]   Patient: Cassandra Gonzales   MR Number: [id number]   YOB: 1955   Date of Visit: 3/15/2018       Dear Dr. Esha Tee    Thank you for referring Cassandra Gonzales to me for evaluation. Attached you will find relevant portions of my assessment and plan of care.    If you have questions, please do not hesitate to call me. I look forward to following Cassandra Gonzales along with you.    Sincerely,    Darren Brunson MD    Enclosure    If you would like to receive this communication electronically, please contact [email or web address] or [phone] to request Issuu Link access.    Issuu Link is a tool which provides read-only access to select patient information with whom you have a relationship. Its easy to use and provides real time access to review your patients record including encounter summaries, notes, results, and demographic information.    If you feel you have received this communication in error or would no longer like to receive these types of communications, please e-mail [email or web address]

## 2018-03-18 PROBLEM — E78.5 DYSLIPIDEMIA: Status: ACTIVE | Noted: 2018-03-18

## 2018-03-20 ENCOUNTER — PATIENT MESSAGE (OUTPATIENT)
Dept: TRANSPLANT | Facility: CLINIC | Age: 63
End: 2018-03-20

## 2018-03-23 ENCOUNTER — PATIENT MESSAGE (OUTPATIENT)
Dept: TRANSPLANT | Facility: CLINIC | Age: 63
End: 2018-03-23

## 2018-03-27 ENCOUNTER — TELEPHONE (OUTPATIENT)
Dept: TRANSPLANT | Facility: CLINIC | Age: 63
End: 2018-03-27

## 2018-03-27 DIAGNOSIS — Z94.1 STATUS POST HEART TRANSPLANT: ICD-10-CM

## 2018-03-27 DIAGNOSIS — T86.20 COMPLICATION OF HEART TRANSPLANT, UNSPECIFIED COMPLICATION: ICD-10-CM

## 2018-03-27 DIAGNOSIS — Z79.899 ENCOUNTER FOR LONG-TERM (CURRENT) USE OF MEDICATIONS: ICD-10-CM

## 2018-04-03 ENCOUNTER — TELEPHONE (OUTPATIENT)
Dept: TRANSPLANT | Facility: CLINIC | Age: 63
End: 2018-04-03

## 2018-04-03 NOTE — TELEPHONE ENCOUNTER
Called pt regarding update from chart review. No answer. LM on  informing pt that per review pt instructed to increase lisinopril to 5 mg daily and get repeat labs locally next week. Instructed pt to call back if she has any questions or concerns.

## 2018-04-06 ENCOUNTER — HOSPITAL ENCOUNTER (OUTPATIENT)
Facility: HOSPITAL | Age: 63
Discharge: HOME | End: 2018-04-06
Attending: INTERNAL MEDICINE | Admitting: INTERNAL MEDICINE
Payer: COMMERCIAL

## 2018-04-06 VITALS
SYSTOLIC BLOOD PRESSURE: 133 MMHG | BODY MASS INDEX: 21.79 KG/M2 | HEIGHT: 63 IN | WEIGHT: 123 LBS | HEART RATE: 80 BPM | TEMPERATURE: 96 F | OXYGEN SATURATION: 100 % | RESPIRATION RATE: 18 BRPM | DIASTOLIC BLOOD PRESSURE: 64 MMHG

## 2018-04-06 DIAGNOSIS — Z94.1 STATUS POST HEART TRANSPLANT: ICD-10-CM

## 2018-04-06 DIAGNOSIS — Z94.1 HEART TRANSPLANTED: ICD-10-CM

## 2018-04-06 DIAGNOSIS — H40.059: Primary | ICD-10-CM

## 2018-04-06 DIAGNOSIS — T86.290 CARDIAC ALLOGRAFT VASCULOPATHY: ICD-10-CM

## 2018-04-06 DIAGNOSIS — R91.8 OTHER NONSPECIFIC ABNORMAL FINDING OF LUNG FIELD: ICD-10-CM

## 2018-04-06 LAB
ABO + RH BLD: NORMAL
ANION GAP SERPL CALC-SCNC: 8 MMOL/L
BASOPHILS # BLD AUTO: 0.04 K/UL
BASOPHILS NFR BLD: 0.9 %
BLD GP AB SCN CELLS X3 SERPL QL: NORMAL
BUN SERPL-MCNC: 16 MG/DL
CALCIUM SERPL-MCNC: 9.2 MG/DL
CHLORIDE SERPL-SCNC: 105 MMOL/L
CO2 SERPL-SCNC: 24 MMOL/L
CREAT SERPL-MCNC: 0.8 MG/DL
DIFFERENTIAL METHOD: ABNORMAL
EOSINOPHIL # BLD AUTO: 0.1 K/UL
EOSINOPHIL NFR BLD: 1.3 %
ERYTHROCYTE [DISTWIDTH] IN BLOOD BY AUTOMATED COUNT: 13.9 %
EST. GFR  (AFRICAN AMERICAN): >60 ML/MIN/1.73 M^2
EST. GFR  (NON AFRICAN AMERICAN): >60 ML/MIN/1.73 M^2
GLUCOSE SERPL-MCNC: 78 MG/DL
HCT VFR BLD AUTO: 35.4 %
HGB BLD-MCNC: 11.2 G/DL
IMM GRANULOCYTES # BLD AUTO: 0.01 K/UL
IMM GRANULOCYTES NFR BLD AUTO: 0.2 %
LYMPHOCYTES # BLD AUTO: 1.1 K/UL
LYMPHOCYTES NFR BLD: 23 %
MCH RBC QN AUTO: 27.1 PG
MCHC RBC AUTO-ENTMCNC: 31.6 G/DL
MCV RBC AUTO: 86 FL
MONOCYTES # BLD AUTO: 0.8 K/UL
MONOCYTES NFR BLD: 16.1 %
NEUTROPHILS # BLD AUTO: 2.7 K/UL
NEUTROPHILS NFR BLD: 58.5 %
NRBC BLD-RTO: 0 /100 WBC
PLATELET # BLD AUTO: 197 K/UL
PMV BLD AUTO: 9.9 FL
POTASSIUM SERPL-SCNC: 4.3 MMOL/L
RBC # BLD AUTO: 4.14 M/UL
SODIUM SERPL-SCNC: 137 MMOL/L
WBC # BLD AUTO: 4.65 K/UL

## 2018-04-06 PROCEDURE — 25000003 PHARM REV CODE 250: Performed by: INTERNAL MEDICINE

## 2018-04-06 PROCEDURE — 99152 MOD SED SAME PHYS/QHP 5/>YRS: CPT | Mod: ,,, | Performed by: INTERNAL MEDICINE

## 2018-04-06 PROCEDURE — 63600175 PHARM REV CODE 636 W HCPCS

## 2018-04-06 PROCEDURE — 93458 L HRT ARTERY/VENTRICLE ANGIO: CPT | Mod: 26,,, | Performed by: INTERNAL MEDICINE

## 2018-04-06 PROCEDURE — 92978 ENDOLUMINL IVUS OCT C 1ST: CPT | Mod: 26,,, | Performed by: INTERNAL MEDICINE

## 2018-04-06 PROCEDURE — 92978 ENDOLUMINL IVUS OCT C 1ST: CPT

## 2018-04-06 PROCEDURE — 25000003 PHARM REV CODE 250

## 2018-04-06 PROCEDURE — 80048 BASIC METABOLIC PNL TOTAL CA: CPT

## 2018-04-06 PROCEDURE — 85025 COMPLETE CBC W/AUTO DIFF WBC: CPT

## 2018-04-06 PROCEDURE — 93458 L HRT ARTERY/VENTRICLE ANGIO: CPT

## 2018-04-06 PROCEDURE — 86901 BLOOD TYPING SEROLOGIC RH(D): CPT

## 2018-04-06 RX ORDER — SODIUM CHLORIDE 9 MG/ML
INJECTION, SOLUTION INTRAVENOUS CONTINUOUS
Status: DISCONTINUED | OUTPATIENT
Start: 2018-04-06 | End: 2018-04-06 | Stop reason: HOSPADM

## 2018-04-06 RX ORDER — DIPHENHYDRAMINE HCL 50 MG
50 CAPSULE ORAL ONCE
Status: COMPLETED | OUTPATIENT
Start: 2018-04-06 | End: 2018-04-06

## 2018-04-06 RX ADMIN — SODIUM CHLORIDE: 0.9 INJECTION, SOLUTION INTRAVENOUS at 08:04

## 2018-04-06 RX ADMIN — DIPHENHYDRAMINE HYDROCHLORIDE 50 MG: 50 CAPSULE ORAL at 08:04

## 2018-04-06 NOTE — NURSING
Pt ambulated around nursing station without distress or discomfort.  Vss. r groin site remains val.  0 bleed, 0hematoma.  Will continue to monitor.

## 2018-04-06 NOTE — PROGRESS NOTES
"Interventional Cardiology   Post Cath Note      Referring Physician: Baldomero Jacobsen MD  Procedure: [facility]  Indication: S/p OHT 3/2016    SUBJECTIVE:     63 year old Ms Gonzales presents for surveilence [facility] post OHT    Cath Results:   Access:  Right CFA     LVEDP 7 mmHg  Normal coronaries by angiography    See full cath report for further details    Intervention:   none  Closure device used: none  Patient tolerated the procedure well, no complications    Post Cath Exam:   /78 (BP Location: Right arm, Patient Position: Lying)   Pulse 90   Temp 96.1 °F (35.6 °C) (Oral)   Resp 18   Ht 5' 3" (1.6 m)   Wt 55.8 kg (123 lb)   LMP 03/01/2007   SpO2 100%   Breastfeeding? No   BMI 21.79 kg/m²     No unusual pain, hematoma, thrill or bruit at vascular access site.  Distal pulse present without signs of ischemia.    Vascular:   LEFT RIGHT   FEMORAL 2+ 2+   DP 2+ 2+   TP 2+ 2+       Assessment / Plan:       63 year old female patient s/p OHT presents for [facility].    CAV. Normal coronaries by angiography. Plaque identified by IVUS in LM  Recs:  Post cath care, IV fluids, bedrest.  Maximize secondary prevention of CAV.  Follow up with [facility]    Wade Prado MD  Cardiovascular Disease Fellow  PGY - IV  Pager: [phone]    4/6/2018 10:28 AM  "

## 2018-04-06 NOTE — NURSING
Pt d/c'd to home via w/c accompanied by family vss.  r groin site remains cdi.  0 bleed, 0hematoma.  pivs removed intact and without difficulty.  Instructed pt on home medications, post procedure precautions and follow up visits.  Pt verbalizes understanding.  Pt in no acute distress and verbalizes no compliants.

## 2018-04-06 NOTE — DISCHARGE SUMMARY
[facility]  Cardiology  Discharge Summary      Patient Name: Cassandra Gonzales  MRN: [id number]  Admission Date: 4/6/2018  Hospital Length of Stay: 0 days  Discharge Date and Time:  04/08/2018 8:25 PM  Attending Physician: Baldomero Jacobsen MD  Discharging Provider: Wade Prado MD  Primary Care Physician: Esha Tee MD    HPI: Mrs. Gonzales is a 61 y/o woman s/ OHT in March 2016 presents for surveillance LHC.     Procedure(s) (LRB):  HEART CATH-LEFT (Left)       Hospital Course : Patient underwent LHC with IVUS without any complications via Right CFA access    Consults: none    Significant Diagnostic Studies: Angiography:     LVEDP=7mmHg.    Normal coronary arteries by angiography.    CAV detected by IVUS with no significant change compared to 4/28/17 study.      Discharged Condition: good  Diet: cardiac diet  Activity: as tolerated.    Follow Up:  Follow-up Information     [facility] In 1 month.    Specialty:  Transplant  Contact information:  [address] [place] [id number] [phone]  Additional information:  3rd floor               Patient Instructions:   No discharge procedures on file.  Medications:  Reconciled Home Medications:      Medication List      CONTINUE taking these medications    alendronate 70 MG tablet  Commonly known as:  FOSAMAX  Take 1 tablet (70 mg total) by mouth every 7 days.     amoxicillin 500 MG capsule  Commonly known as:  AMOXIL     aspirin 81 MG EC tablet  Commonly known as:  ECOTRIN  Take 1 tablet (81 mg total) by mouth once daily.     atorvastatin 40 MG tablet  Commonly known as:  LIPITOR  Take 1 tablet (40 mg total) by mouth once daily.     calcium-vitamin D3 600 mg(1,500mg) -200 unit Tab  Commonly known as:  CALCIUM 600 WITH VITAMIN D3  Take 1 tablet by mouth once.     everolimus (immunosuppressive) 0.5 mg tablet  Commonly known as:  ZORTRESS  Take 2 tablets (1 mg total) by mouth 2 (two) times  daily.     levothyroxine 75 MCG tablet  Commonly known as:  SYNTHROID  Take 1 tablet (75 mcg total) by mouth before breakfast.     lisinopril 5 MG tablet  Commonly known as:  PRINIVIL,ZESTRIL  Take 1 tablet (5 mg total) by mouth once daily.     multivitamin tablet  Commonly known as:  THERAGRAN  Take 1 tablet by mouth once daily.     mycophenolate 250 mg Cap  Commonly known as:  CELLCEPT  Take 4 capsules (1,000 mg total) by mouth 2 (two) times daily.     nystatin 100,000 unit/mL suspension  Commonly known as:  MYCOSTATIN     tacrolimus 1 MG Cap  Commonly known as:  PROGRAF  Take 1 capsule (1 mg total) by mouth every 12 (twelve) hours.          Wade Prado MD  Cardiology  [facility]

## 2018-04-06 NOTE — H&P (VIEW-ONLY)
Subjective:    Patient ID:  Cassandra Gonzales is a 62 y.o. female who presents for follow-up of Heart Transplant      HPI  Mrs. 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  She denies angina or exertional dyspnea.  She denies LE edema orthopnea, or PND.  She denies LE edema, orthopnea, or PND.  She denies palpitations, syncope, or near syncope. She reports good medication compliance.    Past Medical History:   Diagnosis Date    Anticoagulant long-term use     Cardiomyopathy, dilated, nonischemic 2015    CHF (congestive heart failure)     CHF (NYHA class III, ACC/AHA stage C) 2015    Encounter for blood transfusion     Hypertension     Hypothyroidism (acquired)     Peripartum cardiomyopathy 2015    Stroke     TIA in 2014     Past Surgical History:   Procedure Laterality Date    CARDIAC DEFIBRILLATOR PLACEMENT      CARDIAC DEFIBRILLATOR REMOVAL  2016    CARDIAC VALVE REPLACEMENT       SECTION      heart transplanted  2016    right breast tumor       Current Outpatient Prescriptions on File Prior to Visit   Medication Sig Dispense Refill    amoxicillin (AMOXIL) 500 MG capsule As needed for dental appointments      aspirin (ECOTRIN) 81 MG EC tablet Take 1 tablet (81 mg total) by mouth once daily. 30 tablet 11    calcium-vitamin D tablet 600 mg-200 units Take 1 tablet by mouth once.  0    everolimus (ZORTRESS) 0.5 mg tablet Take 2 tablets (1 mg total) by mouth 2 (two) times daily. 360 tablet 3    levothyroxine (SYNTHROID) 75 MCG tablet Take 1 tablet (75 mcg total) by mouth before breakfast. 20 tablet 11    multivitamin (THERAGRAN) tablet Take 1 tablet by mouth once daily.      mycophenolate (CELLCEPT) 250 mg Cap Take 4 capsules (1,000 mg total) by mouth 2 (two) times daily. 240 capsule 11    nystatin (MYCOSTATIN) 100,000 unit/mL suspension Take 4 mLs by mouth 4 (four) times daily as needed. Take as needed for oral mouth sores four times daily PRN .   ONLY FILL PER PATIENT' S REQUEST      tacrolimus (PROGRAF) 1 MG Cap Take 1 capsule (1 mg total) by mouth every 12 (twelve) hours. 240 capsule 4     No current facility-administered medications on file prior to visit.      Review of patient's allergies indicates:   Allergen Reactions    Rapamune [sirolimus]      Rash bilat arms      Social History   Substance Use Topics    Smoking status: Never Smoker    Smokeless tobacco: Never Used    Alcohol use No     Family History   Problem Relation Age of Onset    Heart disease Mother     Hypertension Mother     Cancer Father     No Known Problems Sister     No Known Problems Brother     No Known Problems Maternal Aunt     No Known Problems Maternal Uncle     No Known Problems Paternal Aunt     No Known Problems Paternal Uncle     No Known Problems Maternal Grandmother     No Known Problems Maternal Grandfather     No Known Problems Paternal Grandmother     No Known Problems Paternal Grandfather     Anemia Neg Hx     Arrhythmia Neg Hx     Asthma Neg Hx     Clotting disorder Neg Hx     Fainting Neg Hx     Heart attack Neg Hx     Heart failure Neg Hx     Hyperlipidemia Neg Hx     Stroke Neg Hx     Atrial Septal Defect Neg Hx        Review of Systems   Constitution: Negative for diaphoresis, fever, weakness, malaise/fatigue and weight gain.   HENT: Negative for congestion, hearing loss, nosebleeds and sore throat.    Eyes: Negative for visual disturbance.   Cardiovascular: Negative for chest pain, claudication, dyspnea on exertion, irregular heartbeat, leg swelling, near-syncope, orthopnea, palpitations, paroxysmal nocturnal dyspnea and syncope.   Respiratory: Negative for cough, hemoptysis, shortness of breath and wheezing.    Endocrine: Negative for polyuria.   Hematologic/Lymphatic: Negative for bleeding problem. Does not bruise/bleed easily.   Skin: Negative for poor wound healing and rash.   Musculoskeletal: Negative for myalgias.  "  Gastrointestinal: Negative for abdominal pain, change in bowel habit, constipation, diarrhea, dysphagia, heartburn, hematemesis, melena, nausea and vomiting.   Genitourinary: Negative for bladder incontinence and dysuria.   Neurological: Negative for focal weakness and headaches.   Psychiatric/Behavioral: Negative for depression.   Allergic/Immunologic: Negative for hives and persistent infections.        Objective:  Vitals:    03/15/18 1056 03/15/18 1058   BP: 133/76 133/77   BP Location: Right arm Left arm   Patient Position: Sitting Sitting   BP Method: Large (Automatic) Large (Automatic)   Pulse: 99    SpO2: 98%    Weight: 56.8 kg (125 lb 3.5 oz)    Height: 5' 3" (1.6 m)          Physical Exam   Constitutional: She appears well-developed and well-nourished.   HENT:   Head: Normocephalic and atraumatic.   Eyes: EOM are normal. Pupils are equal, round, and reactive to light. Right eye exhibits no discharge. No scleral icterus.   Neck: No JVD present. No thyromegaly present.   Cardiovascular: Normal rate and regular rhythm.  PMI is not displaced.  Exam reveals no gallop.    No murmur heard.  Pulses:       Carotid pulses are 2+ on the right side, and 2+ on the left side.       Radial pulses are 2+ on the right side, and 2+ on the left side.        Femoral pulses are 2+ on the right side, and 2+ on the left side.       Dorsalis pedis pulses are 2+ on the right side, and 2+ on the left side.        Posterior tibial pulses are 2+ on the right side, and 2+ on the left side.   Pulmonary/Chest: Effort normal and breath sounds normal.   Abdominal: Soft. Bowel sounds are normal. There is no hepatosplenomegaly. There is no tenderness.   Lymphadenopathy:     She has no cervical adenopathy.   Neurological: She is alert. Gait normal.   Skin: Skin is warm, dry and intact. No rash noted. She is not diaphoretic. No erythema.   Psychiatric: She has a normal mood and affect.         Assessment:       1. Essential hypertension  "   2. Status post heart transplant    3. Dyslipidemia    4. Hypothyroidism (acquired)         Plan:       1) S/P OHT - plan for C with IVUS; R CFA; 6F sheath; JL4 and JR4                      - The risks, benefits, and alternatives of coronary vascular angiography and possible intervention were discussed with pt. All questions were answered and informed consent was obtained. I had a detailed discussion with the patient regarding risk of stroke, MI, bleeding access site complications, renal failure, emergent need for heart surgery, acute limb complications including ischemia and loss, contrast allergy and death. Pt understands the risks and benefits of the procedure and wishes to proceed.      2) HTN - BP is controlled; contnue current  meds     3) HLD - lipid panel reviewed from today; continue lipitor     4) h/o hypothyroid. 11/30/17 TSH was wnl; continue synthroid;    All of the patient's questions were answered.

## 2018-04-09 ENCOUNTER — PATIENT MESSAGE (OUTPATIENT)
Dept: TRANSPLANT | Facility: CLINIC | Age: 63
End: 2018-04-09

## 2018-04-09 LAB
POC ACTIVATED CLOTTING TIME K: 142 SEC (ref 74–137)
SAMPLE: ABNORMAL

## 2018-04-10 ENCOUNTER — PATIENT MESSAGE (OUTPATIENT)
Dept: TRANSPLANT | Facility: CLINIC | Age: 63
End: 2018-04-10

## 2018-04-11 ENCOUNTER — TELEPHONE (OUTPATIENT)
Dept: TRANSPLANT | Facility: CLINIC | Age: 63
End: 2018-04-11

## 2018-04-11 NOTE — TELEPHONE ENCOUNTER
Pt called and stated that she was calling for an update from her PCP . Pt is taking abx for 10 days for sinus infection. Pt reports she has no fever at this time. Pt will get her 1 week labs ( after increasing lisinopril to 5 mg ) tomorrow and will get labs after completion of abx. Verbalized understanding and informed pt to call back or to on-call if she has any questions or has any N, V, Fever, SOB, D, CP. Pt verbalized understanding .         Good morning Samira,   I  read your email after 5pm.   I got home & took my temperature, it was 100.4 . I took 1 Tylenol and 45 minutes later  temperature went down to 99.5 .  I called and talked to Dr. Reyes, who was on call. She said it was ok to take Zyrtec w/o D.   Also it was ok to take 1 Amox-Clav 875 mg  ( I had back in Dec 2017).  And make sure to follow up with my primary doctor in the morning.     My temperature  went down during the night  & Vitals good. When I woke up at 5 AM it was 97.3 .  Feeling much much better; No running nose or sneezing.   Ill call dr Davey first thing this morning & will let you something.

## 2018-04-13 ENCOUNTER — TELEPHONE (OUTPATIENT)
Dept: TRANSPLANT | Facility: CLINIC | Age: 63
End: 2018-04-13

## 2018-04-13 LAB
BNP (B-TYPE NATRIURETIC PEP): 50
EXT ALBUMIN: 3.9
EXT ALT: 30
EXT AST: 37
EXT BUN: 18
EXT CALCIUM: 9.4
EXT CHLORIDE: 104
EXT CREATININE: 0.8 MG/DL
EXT GLUCOSE: 84
EXT HEMATOCRIT: 37.6
EXT HEMOGLOBIN: 12.2
EXT MAGNESIUM: 1.8
EXT POTASSIUM: 4.4
EXT PROTEIN TOTAL: 7
EXT SODIUM: 140 MMOL/L
EXT TACROLIMUS LVL: 4.5
EXT WBC: 4.8

## 2018-04-17 ENCOUNTER — PATIENT MESSAGE (OUTPATIENT)
Dept: TRANSPLANT | Facility: CLINIC | Age: 63
End: 2018-04-17

## 2018-04-17 DIAGNOSIS — I10 ESSENTIAL HYPERTENSION: ICD-10-CM

## 2018-04-17 RX ORDER — LISINOPRIL 5 MG/1
5 TABLET ORAL DAILY
Qty: 90 TABLET | Refills: 3 | Status: SHIPPED | OUTPATIENT
Start: 2018-04-17 | End: 2018-05-31 | Stop reason: SDUPTHER

## 2018-04-18 ENCOUNTER — TELEPHONE (OUTPATIENT)
Dept: TRANSPLANT | Facility: CLINIC | Age: 63
End: 2018-04-18

## 2018-04-18 LAB — EXT EVEROLIMUS LEVEL: 4.9

## 2018-04-30 ENCOUNTER — PATIENT MESSAGE (OUTPATIENT)
Dept: TRANSPLANT | Facility: CLINIC | Age: 63
End: 2018-04-30

## 2018-04-30 ENCOUNTER — TELEPHONE (OUTPATIENT)
Dept: TRANSPLANT | Facility: CLINIC | Age: 63
End: 2018-04-30

## 2018-04-30 LAB
1,25(OH)2D3 SERPL-MCNC: 67 PG/ML
BNP (B-TYPE NATRIURETIC PEP): 72
EXT ALBUMIN: 3.8
EXT ALT: 21
EXT AST: 31
EXT BUN: 16
EXT CALCIUM: 8.8
EXT CHLORIDE: 106
EXT CHOLESTEROL (LIPID PANEL): 161
EXT CREATININE: 0.77 MG/DL
EXT EVEROLIMUS LEVEL: 2.8
EXT GLUCOSE: 87
EXT HDL: 65
EXT HEMATOCRIT: 34.7
EXT HEMOGLOBIN: 11.6
EXT LDL CHOLESTEROL: 85
EXT MAGNESIUM: 1.9
EXT PLATELETS: 195
EXT POTASSIUM: 4.2
EXT PROTEIN TOTAL: 6.5
EXT SODIUM: 140 MMOL/L
EXT TACROLIMUS LVL: 3
EXT TRIGLYCERIDES: 57
EXT WBC: 3.7
TSH SERPL DL<=0.005 MIU/L-ACNC: 0.98 UIU/ML (ref 0.41–5.9)
VITAMIN D, 25-OH, TOTAL: 42.8

## 2018-04-30 NOTE — TELEPHONE ENCOUNTER
Called lab to request additional labs that were done . Spoke with labs and confirmed fax. Labs being sent now

## 2018-04-30 NOTE — TELEPHONE ENCOUNTER
Received repeat labs . Everolimus level is 2.8- goal is 3-8. Prior level was 4.9. All labs within normal. Asked pt to repeat level this week. Also, WBC lower today 3.7- pt recently was treated for sinus infection, prior WBC 4.8

## 2018-05-07 ENCOUNTER — TELEPHONE (OUTPATIENT)
Dept: TRANSPLANT | Facility: CLINIC | Age: 63
End: 2018-05-07

## 2018-05-07 LAB
EXT EVEROLIMUS LEVEL: 4
EXT TACROLIMUS LVL: 3.3

## 2018-05-15 ENCOUNTER — TELEPHONE (OUTPATIENT)
Dept: TRANSPLANT | Facility: CLINIC | Age: 63
End: 2018-05-15

## 2018-05-15 LAB
BNP (B-TYPE NATRIURETIC PEP): 43
EXT ALBUMIN: 3.9
EXT ALT: 22
EXT AST: 29
EXT BUN: 22
EXT CALCIUM: 9.2
EXT CHLORIDE: 105
EXT CREATININE: 0.79 MG/DL
EXT GLUCOSE: 74
EXT HEMATOCRIT: 37.4
EXT HEMOGLOBIN: 11.8
EXT PLATELETS: 170
EXT POTASSIUM: 5.2
EXT PROTEIN TOTAL: 6.8
EXT SODIUM: 139 MMOL/L
EXT WBC: 3.5

## 2018-05-16 ENCOUNTER — PATIENT MESSAGE (OUTPATIENT)
Dept: TRANSPLANT | Facility: CLINIC | Age: 63
End: 2018-05-16

## 2018-05-17 ENCOUNTER — PATIENT MESSAGE (OUTPATIENT)
Dept: TRANSPLANT | Facility: CLINIC | Age: 63
End: 2018-05-17

## 2018-05-17 ENCOUNTER — TELEPHONE (OUTPATIENT)
Dept: TRANSPLANT | Facility: CLINIC | Age: 63
End: 2018-05-17

## 2018-05-17 LAB — EXT POTASSIUM: 8.5

## 2018-05-17 NOTE — TELEPHONE ENCOUNTER
Pt returned call. Pt instructed to go to the ER and to call Dr. Ibarra with lab results as he is on call . Pt verbalized understanding.

## 2018-05-17 NOTE — TELEPHONE ENCOUNTER
Received call from [facility] and was informed that pt had a critical value K+ 8.5. Reported to Dr. Lay and instructed pt to go to the ER.     Called pt 2x and no answer. Lm on VM to call back ASAP.    Called pt back again.Lm on  for pt to go to the ER because we received her level back and it is extremely high. Instructed pt to callback ASAP and to go ER at [facility].

## 2018-05-20 ENCOUNTER — PATIENT MESSAGE (OUTPATIENT)
Dept: TRANSPLANT | Facility: CLINIC | Age: 63
End: 2018-05-20

## 2018-05-31 DIAGNOSIS — I10 ESSENTIAL HYPERTENSION: ICD-10-CM

## 2018-05-31 RX ORDER — LISINOPRIL 5 MG/1
5 TABLET ORAL DAILY
Qty: 90 TABLET | Refills: 3 | Status: SHIPPED | OUTPATIENT
Start: 2018-05-31 | End: 2019-05-14 | Stop reason: SDUPTHER

## 2018-06-22 ENCOUNTER — DOCUMENTATION ONLY (OUTPATIENT)
Dept: TRANSPLANT | Facility: CLINIC | Age: 63
End: 2018-06-22

## 2018-06-22 ENCOUNTER — HOSPITAL ENCOUNTER (OUTPATIENT)
Dept: CARDIOLOGY | Facility: CLINIC | Age: 63
Discharge: HOME | End: 2018-06-22
Attending: INTERNAL MEDICINE
Payer: COMMERCIAL

## 2018-06-22 ENCOUNTER — OFFICE VISIT (OUTPATIENT)
Dept: TRANSPLANT | Facility: CLINIC | Age: 63
End: 2018-06-22
Payer: COMMERCIAL

## 2018-06-22 ENCOUNTER — LAB VISIT (OUTPATIENT)
Dept: LAB | Facility: HOSPITAL | Age: 63
End: 2018-06-22
Attending: INTERNAL MEDICINE
Payer: COMMERCIAL

## 2018-06-22 VITALS
BODY MASS INDEX: 22.61 KG/M2 | SYSTOLIC BLOOD PRESSURE: 127 MMHG | HEART RATE: 96 BPM | DIASTOLIC BLOOD PRESSURE: 83 MMHG | WEIGHT: 127.63 LBS | HEIGHT: 63 IN

## 2018-06-22 DIAGNOSIS — T38.0X5D ADVERSE EFFECT OF GLUCOCORTICOID OR SYNTHETIC ANALOGUE, SUBSEQUENT ENCOUNTER: ICD-10-CM

## 2018-06-22 DIAGNOSIS — Z94.1 HEART TRANSPLANTED: Primary | ICD-10-CM

## 2018-06-22 DIAGNOSIS — D72.819 LEUKOPENIA, UNSPECIFIED TYPE: ICD-10-CM

## 2018-06-22 DIAGNOSIS — T86.290 CARDIAC ALLOGRAFT VASCULOPATHY: ICD-10-CM

## 2018-06-22 DIAGNOSIS — Z79.899 ENCOUNTER FOR LONG-TERM (CURRENT) USE OF MEDICATIONS: ICD-10-CM

## 2018-06-22 DIAGNOSIS — T86.20 COMPLICATION OF HEART TRANSPLANT, UNSPECIFIED COMPLICATION: ICD-10-CM

## 2018-06-22 DIAGNOSIS — Z94.1 HEART REPLACED BY TRANSPLANT: ICD-10-CM

## 2018-06-22 DIAGNOSIS — E03.9 HYPOTHYROIDISM, UNSPECIFIED TYPE: ICD-10-CM

## 2018-06-22 DIAGNOSIS — E78.5 DYSLIPIDEMIA: ICD-10-CM

## 2018-06-22 DIAGNOSIS — B25.8 OTHER CYTOMEGALOVIRAL DISEASES: ICD-10-CM

## 2018-06-22 DIAGNOSIS — E03.9 HYPOTHYROIDISM (ACQUIRED): ICD-10-CM

## 2018-06-22 DIAGNOSIS — T86.21 ACUTE REJECTION OF HEART TRANSPLANT: ICD-10-CM

## 2018-06-22 DIAGNOSIS — Z94.1 STATUS POST HEART TRANSPLANT: ICD-10-CM

## 2018-06-22 DIAGNOSIS — Z79.60 LONG-TERM USE OF IMMUNOSUPPRESSANT MEDICATION: ICD-10-CM

## 2018-06-22 DIAGNOSIS — I10 ESSENTIAL HYPERTENSION: ICD-10-CM

## 2018-06-22 DIAGNOSIS — Z94.1 HEART REPLACED BY TRANSPLANT: Primary | ICD-10-CM

## 2018-06-22 PROBLEM — H40.059 OHT (OCULAR HYPERTENSION): Status: RESOLVED | Noted: 2018-04-06 | Resolved: 2018-06-22

## 2018-06-22 LAB
ALBUMIN SERPL BCP-MCNC: 4.1 G/DL
ALP SERPL-CCNC: 63 U/L
ALT SERPL W/O P-5'-P-CCNC: 23 U/L
ANION GAP SERPL CALC-SCNC: 13 MMOL/L
AST SERPL-CCNC: 30 U/L
BASOPHILS # BLD AUTO: 0.03 K/UL
BASOPHILS NFR BLD: 0.9 %
BILIRUB SERPL-MCNC: 0.9 MG/DL
BNP SERPL-MCNC: 69 PG/ML
BUN SERPL-MCNC: 19 MG/DL
CALCIUM SERPL-MCNC: 9.4 MG/DL
CHLORIDE SERPL-SCNC: 103 MMOL/L
CHOLEST SERPL-MCNC: 177 MG/DL
CHOLEST/HDLC SERPL: 2.3 {RATIO}
CO2 SERPL-SCNC: 22 MMOL/L
CREAT SERPL-MCNC: 0.8 MG/DL
DIASTOLIC DYSFUNCTION: NO
DIFFERENTIAL METHOD: ABNORMAL
EOSINOPHIL # BLD AUTO: 0.1 K/UL
EOSINOPHIL NFR BLD: 1.7 %
ERYTHROCYTE [DISTWIDTH] IN BLOOD BY AUTOMATED COUNT: 14 %
EST. GFR  (AFRICAN AMERICAN): >60 ML/MIN/1.73 M^2
EST. GFR  (NON AFRICAN AMERICAN): >60 ML/MIN/1.73 M^2
ESTIMATED PA SYSTOLIC PRESSURE: 34.63
GLUCOSE SERPL-MCNC: 83 MG/DL
HCT VFR BLD AUTO: 36.5 %
HDLC SERPL-MCNC: 77 MG/DL
HDLC SERPL: 43.5 %
HGB BLD-MCNC: 11.1 G/DL
IMM GRANULOCYTES # BLD AUTO: 0 K/UL
IMM GRANULOCYTES NFR BLD AUTO: 0 %
LDLC SERPL CALC-MCNC: 85.6 MG/DL
LYMPHOCYTES # BLD AUTO: 1.3 K/UL
LYMPHOCYTES NFR BLD: 35.9 %
MAGNESIUM SERPL-MCNC: 2.1 MG/DL
MCH RBC QN AUTO: 26.7 PG
MCHC RBC AUTO-ENTMCNC: 30.4 G/DL
MCV RBC AUTO: 88 FL
MITRAL VALVE REGURGITATION: NORMAL
MONOCYTES # BLD AUTO: 0.5 K/UL
MONOCYTES NFR BLD: 14.1 %
NEUTROPHILS # BLD AUTO: 1.7 K/UL
NEUTROPHILS NFR BLD: 47.4 %
NONHDLC SERPL-MCNC: 100 MG/DL
NRBC BLD-RTO: 0 /100 WBC
PLATELET # BLD AUTO: 188 K/UL
PMV BLD AUTO: 10.4 FL
POTASSIUM SERPL-SCNC: 3.5 MMOL/L
PROT SERPL-MCNC: 7.5 G/DL
RBC # BLD AUTO: 4.15 M/UL
RETIRED EF AND QEF - SEE NOTES: 68 (ref 55–65)
SODIUM SERPL-SCNC: 138 MMOL/L
TACROLIMUS BLD-MCNC: 3.8 NG/ML
TRIGL SERPL-MCNC: 72 MG/DL
WBC # BLD AUTO: 3.48 K/UL

## 2018-06-22 PROCEDURE — 83735 ASSAY OF MAGNESIUM: CPT | Mod: NTX

## 2018-06-22 PROCEDURE — 3008F BODY MASS INDEX DOCD: CPT | Mod: CPTII,S$GLB,, | Performed by: INTERNAL MEDICINE

## 2018-06-22 PROCEDURE — 83880 ASSAY OF NATRIURETIC PEPTIDE: CPT | Mod: NTX

## 2018-06-22 PROCEDURE — 80169 DRUG ASSAY EVEROLIMUS: CPT

## 2018-06-22 PROCEDURE — 86832 HLA CLASS I HIGH DEFIN QUAL: CPT | Mod: PO,TXP

## 2018-06-22 PROCEDURE — 93306 TTE W/DOPPLER COMPLETE: CPT | Mod: S$GLB,,, | Performed by: INTERNAL MEDICINE

## 2018-06-22 PROCEDURE — 86833 HLA CLASS II HIGH DEFIN QUAL: CPT | Mod: 91,PO,NTX

## 2018-06-22 PROCEDURE — 85025 COMPLETE CBC W/AUTO DIFF WBC: CPT

## 2018-06-22 PROCEDURE — 36415 COLL VENOUS BLD VENIPUNCTURE: CPT | Mod: NTX

## 2018-06-22 PROCEDURE — 86832 HLA CLASS I HIGH DEFIN QUAL: CPT | Mod: 91,PO,NTX

## 2018-06-22 PROCEDURE — 99999 PR PBB SHADOW E&M-EST. PATIENT-LVL III: CPT | Mod: PBBFAC,,, | Performed by: INTERNAL MEDICINE

## 2018-06-22 PROCEDURE — 80197 ASSAY OF TACROLIMUS: CPT | Mod: NTX

## 2018-06-22 PROCEDURE — 86977 RBC SERUM PRETX INCUBJ/INHIB: CPT | Mod: 91,PO,NTX

## 2018-06-22 PROCEDURE — 3079F DIAST BP 80-89 MM HG: CPT | Mod: CPTII,S$GLB,, | Performed by: INTERNAL MEDICINE

## 2018-06-22 PROCEDURE — 80053 COMPREHEN METABOLIC PANEL: CPT

## 2018-06-22 PROCEDURE — 3074F SYST BP LT 130 MM HG: CPT | Mod: CPTII,S$GLB,, | Performed by: INTERNAL MEDICINE

## 2018-06-22 PROCEDURE — 80061 LIPID PANEL: CPT

## 2018-06-22 PROCEDURE — 99214 OFFICE O/P EST MOD 30 MIN: CPT | Mod: S$GLB,,, | Performed by: INTERNAL MEDICINE

## 2018-06-22 RX ORDER — DEXAMETHASONE 0.5 MG/5ML
SOLUTION ORAL DAILY PRN
COMMUNITY
Start: 2018-04-10 | End: 2019-03-28 | Stop reason: SDUPTHER

## 2018-06-22 RX ORDER — CALCIUM CARBONATE/VITAMIN D3 600MG-5MCG
1 TABLET ORAL ONCE
Refills: 0 | COMMUNITY
Start: 2018-06-22 | End: 2021-03-25 | Stop reason: SDUPTHER

## 2018-06-22 RX ORDER — ALENDRONATE SODIUM 70 MG/1
70 TABLET ORAL
Qty: 51 TABLET | Refills: 0 | Status: SHIPPED | OUTPATIENT
Start: 2018-06-22 | End: 2019-06-22

## 2018-06-22 RX ORDER — EVEROLIMUS 0.5 MG/1
1 TABLET ORAL 2 TIMES DAILY
Qty: 360 TABLET | Refills: 3 | Status: SHIPPED | OUTPATIENT
Start: 2018-06-22 | End: 2019-05-08

## 2018-06-22 RX ORDER — DEXAMETHASONE 0.5 MG/5ML
SOLUTION ORAL
Qty: 120 ML | Refills: 3 | Status: SHIPPED | OUTPATIENT
Start: 2018-06-22 | End: 2019-10-14

## 2018-06-22 RX ORDER — NYSTATIN 100000 [USP'U]/ML
4 SUSPENSION ORAL 4 TIMES DAILY PRN
Qty: 473 ML | Refills: 3 | Status: SHIPPED | OUTPATIENT
Start: 2018-06-22 | End: 2018-12-28

## 2018-06-22 RX ORDER — DEXAMETHASONE 0.5 MG/5ML
SOLUTION ORAL DAILY PRN
Status: CANCELLED | OUTPATIENT
Start: 2018-06-22

## 2018-06-22 RX ORDER — LEVOTHYROXINE SODIUM 75 UG/1
75 TABLET ORAL
Qty: 20 TABLET | Refills: 11 | Status: SHIPPED | OUTPATIENT
Start: 2018-06-22 | End: 2021-08-06

## 2018-06-22 RX ORDER — DEXAMETHASONE 0.5 MG/5ML
SOLUTION ORAL
Qty: 500 ML | Refills: 3 | Status: CANCELLED | OUTPATIENT
Start: 2018-06-22

## 2018-06-22 NOTE — PROGRESS NOTES
Subjective:   Ms. Gonzales is a 62 y.o. year old White female who received a  - brain death heart transplant on 3/25/16.       CMV status:   Donor: +  Recipient: -       HPI  Ms. Gonzales is a very pleasant 62 y.o. 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 She was asymptomatic and graft function was normal. She was given IV Solu Medrol 500 mg X 1. HTS staff and pathology staff reviewed the pathology, and there was no evidence of cellular necrosis, so she was sent home on a steroid pulse of Prednisone 30 mg bid X 2 days followed by 5 mg qd with EMBx done today likely to be followed by a slow taper. 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 She was started on Rapamune 2017 clinic visit. Since that visit she had an admission for mouth sores and facial swelling (-8/10) and she was d/c'd on Abx. She saw ENT who biopsied lesions showing just apthous ulcers thought likely due to IS medications.  She  today for her 2.year 3 mo post-Tx visit.     Doing really well. Has no complaints. Did a 5K without any limitations- no swelling, SOB.     Immuno regimen includes; Prograf , Cellcept 1000 mg BID, Everolimus Off Prednisone. Most recent echo showed preserved graft fucntion.     [facility] 18    LVEDP=7mmHg.    Normal coronary arteries by angiography.    CAV detected by IVUS with no significant change compared to 17 study.    3/15/18   1 - Normal left ventricular systolic function (EF 60-65%).     2 - Normal left ventricular diastolic function.     3 - Normal right ventricular systolic function .     4 - The estimated PA systolic pressure is greater than 26 mmHg.     5 -  "Trivial tricuspid regurgitation.     6 - No wall motion abnormalities.     7 - Concentric remodeling.   Review of Systems   Constitution: Negative. Negative for chills, decreased appetite, diaphoresis, fever, weakness, malaise/fatigue, night sweats, weight gain and weight loss.   Eyes: Negative.    Cardiovascular: Negative for chest pain, claudication, cyanosis, dyspnea on exertion, irregular heartbeat, leg swelling, near-syncope, orthopnea, palpitations, paroxysmal nocturnal dyspnea and syncope.   Respiratory: Negative for cough, hemoptysis and shortness of breath.    Endocrine: Negative.    Hematologic/Lymphatic: Negative.    Skin: Negative for color change, dry skin and nail changes.   Musculoskeletal: Negative.    Gastrointestinal: Negative.    Genitourinary: Negative.        Objective:   Blood pressure 127/83, pulse 96, height 5' 3" (1.6 m), weight 57.9 kg (127 lb 10.3 oz), last menstrual period 03/01/2007.body mass index is 22.61 kg/m².    Physical Exam   Constitutional: She is oriented to person, place, and time. Vital signs are normal. She appears well-developed and well-nourished.   HENT:   Head: Normocephalic.   Eyes: Pupils are equal, round, and reactive to light.   Neck: Neck supple. No JVD present.   Cardiovascular: Normal rate, regular rhythm, normal heart sounds and intact distal pulses.  PMI is not displaced.  Exam reveals no gallop and no friction rub.    No murmur heard.  Pulmonary/Chest: Effort normal and breath sounds normal. No respiratory distress. She has no wheezes. She has no rales.   Abdominal: Soft. Bowel sounds are normal. She exhibits no distension. There is no tenderness. There is no rebound.   Musculoskeletal: She exhibits no edema.   Neurological: She is alert and oriented to person, place, and time.   Nursing note and vitals reviewed.      Lab Results   Component Value Date    WBC 3.48 (L) 06/22/2018    HGB 11.1 (L) 06/22/2018    HCT 36.5 (L) 06/22/2018    MCV 88 06/22/2018    PLT " 188 06/22/2018    CO2 22 (L) 06/22/2018    CREATININE 0.8 06/22/2018    CALCIUM 9.4 06/22/2018    ALBUMIN 4.1 06/22/2018    AST 30 06/22/2018    BNP 69 06/22/2018    ALT 23 06/22/2018    ALLOMAP See result image under hyperlink 03/15/2018       Lab Results   Component Value Date    INR 1.0 10/27/2016    INR 1.0 10/26/2016    INR 1.0 10/25/2016       BNP   Date Value Ref Range Status   06/22/2018 69 0 - 99 pg/mL Final     Comment:     Values of less than 100 pg/ml are consistent with non-CHF populations.   03/15/2018 63 0 - 99 pg/mL Final     Comment:     Values of less than 100 pg/ml are consistent with non-CHF populations.   11/30/2017 56 0 - 99 pg/mL Final     Comment:     Values of less than 100 pg/ml are consistent with non-CHF populations.       No results found for: LDH    Tacrolimus Lvl   Date Value Ref Range Status   03/15/2018 5.0 5.0 - 15.0 ng/mL Final     Comment:     Testing performed by Liquid Chromatography-Tandem  Mass Spectrometry (LC-MS/MS).  This test was developed and its performance characteristics  determined by [facility], Department of Pathology  and Laboratory Medicine in a manner consistent with CLIA  requirements. It has not been cleared or approved by the [place]  Food and Drug Administration.  This test is used for clinical   purposes.  It should not be regarded as investigational or for  research.       No results found for: SIROLIMUS  No results found for: CYCLOSPORINE    No results found for this or any previous visit.  No results found for this or any previous visit.    Labs were reviewed with the patient.    Assessment:     1. Heart transplanted    2. Long-term use of immunosuppressant medication    3. Leukopenia, unspecified type    4. Other cytomegaloviral diseases    5. Cardiac allograft vasculopathy    6. Adverse effect of glucocorticoid or synthetic analogue, subsequent encounter    7. Acute rejection of heart transplant    8. Dyslipidemia    9. Essential hypertension     10. Hypothyroidism (acquired)        Plan:   Doing well- labs so far look good, BP controlled and pt feels great  Continue current immuno regimen  Follow Tacro and Everolimus levels along with echo scheduled later this am         Return instructions as set forth by post transplant schedule or as needed:     Clinic: Return for labs and/or biopsy weekly the first month, every two weeks during month 2 and then monthly for the first year at the provider or coordinator's discretion. During the second year, return to clinic every 3 months. Post transplant year 3-5 return every 6 months. There will be a comprehensive post transplant evaluation every year that may include LHC/RHC/biopsy, stress test, echo, CXR, and other health screening exams.     In addition to the clinical assessment, I have ordered Allomap testing for this patient to assist in identification of moderate/severe acute cellular rejection (ACR) in a pt with stable Allograft function instead of endomyocardial biopsy.      Patient is reminded to call with any health changes as these can be early signs of transplant complications. Patient is advised to make sure any new medications or changes of old medications are discussed with a pharmacist or physician knowledgeable with transplant to avoid rejection/drug toxicity related to significant drug interactions.     UNOS Patient Status  Functional Status: 100% - Normal, no complaints, no evidence of disease  Physical Capacity: No Limitations  Working for Income: Unknown     Stephanie Lay MD

## 2018-06-22 NOTE — LETTER
June 22, 2018        Esha Tee  [address]  [place] [address]  Phone: [phone]  Fax: [phone]             [facility] [address] [place] [address]  Phone: [phone]   Patient: Cassandra Gonzales   MR Number: [id number]   YOB: 1955   Date of Visit: 6/22/2018       Dear Dr. Esha Tee    Thank you for referring Cassandra Gonzales to me for evaluation. Attached you will find relevant portions of my assessment and plan of care.    If you have questions, please do not hesitate to call me. I look forward to following Cassandra Gonzales along with you.    Sincerely,    Stephanie Lay MD    Enclosure    If you would like to receive this communication electronically, please contact [email or web address] or [phone] to request [employer] Link access.    [employer] is a tool which provides read-only access to select patient information with whom you have a relationship. Its easy to use and provides real time access to review your patients record including encounter summaries, notes, results, and demographic information.    If you feel you have received this communication in error or would no longer like to receive these types of communications, please e-mail [email or web address]

## 2018-06-23 LAB — EVEROLIMUS BLD-MCNC: 4 NG/ML

## 2018-06-25 LAB
ALLOMAP TEST SCORE: NORMAL
CLASS I ANTIBODY COMMENTS - LUMINEX: NORMAL
CLASS II ANTIBODY COMMENTS - LUMINEX: NORMAL
DSA1 TESTING DATE: NORMAL
DSA12 TESTING DATE: NORMAL
DSA2 TESTING DATE: NORMAL
SERUM COLLECTION DT - LUMINEX CLASS I: NORMAL
SERUM COLLECTION DT - LUMINEX CLASS II: NORMAL

## 2018-06-26 ENCOUNTER — PATIENT MESSAGE (OUTPATIENT)
Dept: TRANSPLANT | Facility: CLINIC | Age: 63
End: 2018-06-26

## 2018-07-24 ENCOUNTER — PATIENT MESSAGE (OUTPATIENT)
Dept: TRANSPLANT | Facility: CLINIC | Age: 63
End: 2018-07-24

## 2018-08-08 ENCOUNTER — PATIENT MESSAGE (OUTPATIENT)
Dept: TRANSPLANT | Facility: CLINIC | Age: 63
End: 2018-08-08

## 2018-09-13 ENCOUNTER — PATIENT MESSAGE (OUTPATIENT)
Dept: TRANSPLANT | Facility: CLINIC | Age: 63
End: 2018-09-13

## 2018-09-17 RX ORDER — TACROLIMUS 1 MG/1
1 CAPSULE ORAL EVERY 12 HOURS
Qty: 240 CAPSULE | Refills: 4 | Status: SHIPPED | OUTPATIENT
Start: 2018-09-17 | End: 2019-10-14

## 2018-09-24 ENCOUNTER — PATIENT MESSAGE (OUTPATIENT)
Dept: TRANSPLANT | Facility: CLINIC | Age: 63
End: 2018-09-24

## 2018-09-25 ENCOUNTER — LAB VISIT (OUTPATIENT)
Dept: LAB | Facility: HOSPITAL | Age: 63
End: 2018-09-25
Attending: INTERNAL MEDICINE
Payer: COMMERCIAL

## 2018-09-25 ENCOUNTER — OFFICE VISIT (OUTPATIENT)
Dept: TRANSPLANT | Facility: CLINIC | Age: 63
End: 2018-09-25
Attending: INTERNAL MEDICINE
Payer: COMMERCIAL

## 2018-09-25 ENCOUNTER — HOSPITAL ENCOUNTER (OUTPATIENT)
Dept: CARDIOLOGY | Facility: CLINIC | Age: 63
Discharge: HOME | End: 2018-09-25
Attending: INTERNAL MEDICINE
Payer: COMMERCIAL

## 2018-09-25 VITALS
HEIGHT: 63 IN | HEART RATE: 88 BPM | DIASTOLIC BLOOD PRESSURE: 74 MMHG | BODY MASS INDEX: 22.97 KG/M2 | WEIGHT: 129.63 LBS | SYSTOLIC BLOOD PRESSURE: 122 MMHG

## 2018-09-25 DIAGNOSIS — Z94.1 HEART REPLACED BY TRANSPLANT: ICD-10-CM

## 2018-09-25 DIAGNOSIS — Z79.899 ENCOUNTER FOR LONG-TERM (CURRENT) USE OF MEDICATIONS: ICD-10-CM

## 2018-09-25 DIAGNOSIS — D84.9 IMMUNOSUPPRESSION: ICD-10-CM

## 2018-09-25 DIAGNOSIS — T86.290 CARDIAC ALLOGRAFT VASCULOPATHY: ICD-10-CM

## 2018-09-25 DIAGNOSIS — Z94.1 STATUS POST HEART TRANSPLANT: ICD-10-CM

## 2018-09-25 DIAGNOSIS — T86.20 COMPLICATION OF HEART TRANSPLANT, UNSPECIFIED COMPLICATION: ICD-10-CM

## 2018-09-25 DIAGNOSIS — D70.8 OTHER NEUTROPENIA: ICD-10-CM

## 2018-09-25 DIAGNOSIS — I10 ESSENTIAL HYPERTENSION: ICD-10-CM

## 2018-09-25 DIAGNOSIS — Z79.60 LONG-TERM USE OF IMMUNOSUPPRESSANT MEDICATION: ICD-10-CM

## 2018-09-25 DIAGNOSIS — Z94.1 HEART TRANSPLANTED: Primary | ICD-10-CM

## 2018-09-25 DIAGNOSIS — B25.8 OTHER CYTOMEGALOVIRAL DISEASES: ICD-10-CM

## 2018-09-25 LAB
ALBUMIN SERPL BCP-MCNC: 4.4 G/DL
ALP SERPL-CCNC: 63 U/L
ALT SERPL W/O P-5'-P-CCNC: 21 U/L
ANION GAP SERPL CALC-SCNC: 10 MMOL/L
AST SERPL-CCNC: 30 U/L
BASOPHILS # BLD AUTO: 0.02 K/UL
BASOPHILS NFR BLD: 0.4 %
BILIRUB SERPL-MCNC: 0.8 MG/DL
BNP SERPL-MCNC: 108 PG/ML
BUN SERPL-MCNC: 24 MG/DL
CALCIUM SERPL-MCNC: 9.9 MG/DL
CHLORIDE SERPL-SCNC: 106 MMOL/L
CHOLEST SERPL-MCNC: 175 MG/DL
CHOLEST/HDLC SERPL: 2.2 {RATIO}
CO2 SERPL-SCNC: 23 MMOL/L
CREAT SERPL-MCNC: 0.9 MG/DL
DIASTOLIC DYSFUNCTION: NO
DIFFERENTIAL METHOD: ABNORMAL
EOSINOPHIL # BLD AUTO: 0.1 K/UL
EOSINOPHIL NFR BLD: 1.2 %
ERYTHROCYTE [DISTWIDTH] IN BLOOD BY AUTOMATED COUNT: 13.8 %
EST. GFR  (AFRICAN AMERICAN): >60 ML/MIN/1.73 M^2
EST. GFR  (NON AFRICAN AMERICAN): >60 ML/MIN/1.73 M^2
ESTIMATED PA SYSTOLIC PRESSURE: 32.59
GLUCOSE SERPL-MCNC: 81 MG/DL
HCT VFR BLD AUTO: 37.9 %
HDLC SERPL-MCNC: 80 MG/DL
HDLC SERPL: 45.7 %
HGB BLD-MCNC: 11.7 G/DL
IMM GRANULOCYTES # BLD AUTO: 0 K/UL
IMM GRANULOCYTES NFR BLD AUTO: 0 %
LDLC SERPL CALC-MCNC: 82.2 MG/DL
LYMPHOCYTES # BLD AUTO: 1.6 K/UL
LYMPHOCYTES NFR BLD: 30.8 %
MAGNESIUM SERPL-MCNC: 1.7 MG/DL
MCH RBC QN AUTO: 27.3 PG
MCHC RBC AUTO-ENTMCNC: 30.9 G/DL
MCV RBC AUTO: 88 FL
MONOCYTES # BLD AUTO: 0.5 K/UL
MONOCYTES NFR BLD: 9.8 %
NEUTROPHILS # BLD AUTO: 3 K/UL
NEUTROPHILS NFR BLD: 57.8 %
NONHDLC SERPL-MCNC: 95 MG/DL
NRBC BLD-RTO: 0 /100 WBC
PLATELET # BLD AUTO: 194 K/UL
PMV BLD AUTO: 10.3 FL
POTASSIUM SERPL-SCNC: 3.8 MMOL/L
PROT SERPL-MCNC: 7.9 G/DL
RBC # BLD AUTO: 4.29 M/UL
RETIRED EF AND QEF - SEE NOTES: 60 (ref 55–65)
SODIUM SERPL-SCNC: 139 MMOL/L
TRICUSPID VALVE REGURGITATION: NORMAL
TRIGL SERPL-MCNC: 64 MG/DL
WBC # BLD AUTO: 5.1 K/UL

## 2018-09-25 PROCEDURE — 3074F SYST BP LT 130 MM HG: CPT | Mod: CPTII,S$GLB,, | Performed by: INTERNAL MEDICINE

## 2018-09-25 PROCEDURE — 86832 HLA CLASS I HIGH DEFIN QUAL: CPT | Mod: PO,TXP

## 2018-09-25 PROCEDURE — 85025 COMPLETE CBC W/AUTO DIFF WBC: CPT | Mod: NTX

## 2018-09-25 PROCEDURE — 86977 RBC SERUM PRETX INCUBJ/INHIB: CPT | Mod: 91,PO,NTX

## 2018-09-25 PROCEDURE — 80061 LIPID PANEL: CPT | Mod: NTX

## 2018-09-25 PROCEDURE — 80053 COMPREHEN METABOLIC PANEL: CPT | Mod: NTX

## 2018-09-25 PROCEDURE — 3008F BODY MASS INDEX DOCD: CPT | Mod: CPTII,S$GLB,, | Performed by: INTERNAL MEDICINE

## 2018-09-25 PROCEDURE — 86833 HLA CLASS II HIGH DEFIN QUAL: CPT | Mod: PO,TXP

## 2018-09-25 PROCEDURE — 99215 OFFICE O/P EST HI 40 MIN: CPT | Mod: S$GLB,,, | Performed by: INTERNAL MEDICINE

## 2018-09-25 PROCEDURE — 93306 TTE W/DOPPLER COMPLETE: CPT | Mod: S$GLB,,, | Performed by: INTERNAL MEDICINE

## 2018-09-25 PROCEDURE — 86832 HLA CLASS I HIGH DEFIN QUAL: CPT | Mod: 91,PO,TXP

## 2018-09-25 PROCEDURE — 99999 PR PBB SHADOW E&M-EST. PATIENT-LVL III: CPT | Mod: PBBFAC,,, | Performed by: INTERNAL MEDICINE

## 2018-09-25 PROCEDURE — 80169 DRUG ASSAY EVEROLIMUS: CPT

## 2018-09-25 PROCEDURE — 86833 HLA CLASS II HIGH DEFIN QUAL: CPT | Mod: 91,PO,TXP

## 2018-09-25 PROCEDURE — 3078F DIAST BP <80 MM HG: CPT | Mod: CPTII,S$GLB,, | Performed by: INTERNAL MEDICINE

## 2018-09-25 PROCEDURE — 83880 ASSAY OF NATRIURETIC PEPTIDE: CPT | Mod: NTX

## 2018-09-25 PROCEDURE — 83735 ASSAY OF MAGNESIUM: CPT | Mod: NTX

## 2018-09-25 NOTE — H&P (VIEW-ONLY)
Subjective:   Ms. Gonzales is a 62 y.o. year old White female who received a  - brain death heart transplant on 3/25/16.       CMV status:   Donor: +  Recipient: -       HPI  Ms. Gonzales is a very pleasant 62 y.o. 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 She was asymptomatic and graft function was normal. She was given IV Solu Medrol 500 mg X 1. HTS staff and pathology staff reviewed the pathology, and there was no evidence of cellular necrosis, so she was sent home on a steroid pulse of Prednisone 30 mg bid X 2 days followed by 5 mg qd with EMBx done today likely to be followed by a slow taper. 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 She was started on Rapamune 2017 clinic visit.     Doing really well. Has no complaints.     Immuno regimen includes; Prograf , Cellcept 1000 mg BID, Everolimus  Off Prednisone.       2018. Echo preliminary EF normal       [facility] 18    LVEDP=7mmHg.    Normal coronary arteries by angiography.    CAV detected by IVUS with no significant change compared to 17 study.    3/15/18   1 - Normal left ventricular systolic function (EF 60-65%).     2 - Normal left ventricular diastolic function.     3 - Normal right ventricular systolic function .     4 - The estimated PA systolic pressure is greater than 26 mmHg.     5 - Trivial tricuspid regurgitation.     6 - No wall motion abnormalities.     7 - Concentric remodeling.   Review of Systems   Constitution: Negative. Negative for chills, decreased appetite, diaphoresis, fever, weakness, malaise/fatigue, night sweats, weight gain and weight loss.   Eyes: Negative.   "  Cardiovascular: Negative for chest pain, claudication, cyanosis, dyspnea on exertion, irregular heartbeat, leg swelling, near-syncope, orthopnea, palpitations, paroxysmal nocturnal dyspnea and syncope.   Respiratory: Negative for cough, hemoptysis and shortness of breath.    Endocrine: Negative.    Hematologic/Lymphatic: Negative.    Skin: Negative for color change, dry skin and nail changes.   Musculoskeletal: Negative.    Gastrointestinal: Negative.    Genitourinary: Negative.        Objective:   Blood pressure 122/74, pulse 88, height 5' 3" (1.6 m), weight 58.8 kg (129 lb 10.1 oz), last menstrual period 03/01/2007.body mass index is 22.96 kg/m².    Physical Exam   Constitutional: She is oriented to person, place, and time. Vital signs are normal. She appears well-developed and well-nourished.   HENT:   Head: Normocephalic.   Eyes: Pupils are equal, round, and reactive to light.   Neck: Neck supple. No JVD present.   Cardiovascular: Normal rate, regular rhythm, normal heart sounds and intact distal pulses. PMI is not displaced. Exam reveals no gallop and no friction rub.   No murmur heard.  Pulmonary/Chest: Effort normal and breath sounds normal. No respiratory distress. She has no wheezes. She has no rales.   Abdominal: Soft. Bowel sounds are normal. She exhibits no distension. There is no tenderness. There is no rebound.   Musculoskeletal: She exhibits no edema.   Neurological: She is alert and oriented to person, place, and time.   Nursing note and vitals reviewed.      Lab Results   Component Value Date    WBC 5.10 09/25/2018    HGB 11.7 (L) 09/25/2018    HCT 37.9 09/25/2018    MCV 88 09/25/2018     09/25/2018    CO2 23 09/25/2018    CREATININE 0.9 09/25/2018    CALCIUM 9.9 09/25/2018    ALBUMIN 4.4 09/25/2018    AST 30 09/25/2018     (H) 09/25/2018    ALT 21 09/25/2018    ALLOMAP See result image under hyperlink 06/22/2018       Lab Results   Component Value Date    INR 1.0 10/27/2016    INR " 1.0 10/26/2016    INR 1.0 10/25/2016       BNP   Date Value Ref Range Status   09/25/2018 108 (H) 0 - 99 pg/mL Final     Comment:     Values of less than 100 pg/ml are consistent with non-CHF populations.   06/22/2018 69 0 - 99 pg/mL Final     Comment:     Values of less than 100 pg/ml are consistent with non-CHF populations.   03/15/2018 63 0 - 99 pg/mL Final     Comment:     Values of less than 100 pg/ml are consistent with non-CHF populations.       No results found for: LDH    Tacrolimus Lvl   Date Value Ref Range Status   06/22/2018 3.8 (L) 5.0 - 15.0 ng/mL Final     Comment:     Testing performed by Liquid Chromatography-Tandem  Mass Spectrometry (LC-MS/MS).  This test was developed and its performance characteristics  determined by [facility], Department of Pathology  and Laboratory Medicine in a manner consistent with CLIA  requirements. It has not been cleared or approved by the [place]  Food and Drug Administration.  This test is used for clinical   purposes.  It should not be regarded as investigational or for  research.       No results found for: SIROLIMUS  No results found for: CYCLOSPORINE    No results found for this or any previous visit.  No results found for this or any previous visit.    Labs were reviewed with the patient.    Assessment:     1. Heart transplanted    2. Status post heart transplant    3. Essential hypertension    4. Cardiac allograft vasculopathy    5. Other cytomegaloviral diseases    6. Immunosuppression    7. Other neutropenia    8. Long-term use of immunosuppressant medication        Plan:   Doing well  Continue current immuno regimen  Follow Tacro and Everolimus levels    Return instructions as set forth by post transplant schedule or as needed:     Clinic: Return for labs and/or biopsy weekly the first month, every two weeks during month 2 and then monthly for the first year at the provider or coordinator's discretion. During the second year, return to clinic every 3  months. Post transplant year 3-5 return every 6 months. There will be a comprehensive post transplant evaluation every year that may include LHC/RHC/biopsy, stress test, echo, CXR, and other health screening exams.     In addition to the clinical assessment, I have ordered Allomap testing for this patient to assist in identification of moderate/severe acute cellular rejection (ACR) in a pt with stable Allograft function instead of endomyocardial biopsy.      Patient is reminded to call with any health changes as these can be early signs of transplant complications. Patient is advised to make sure any new medications or changes of old medications are discussed with a pharmacist or physician knowledgeable with transplant to avoid rejection/drug toxicity related to significant drug interactions.     UNOS Patient Status  Functional Status: 100% - Normal, no complaints, no evidence of disease  Physical Capacity: No Limitations  Working for Income: Unknown     Stephanie Lay MD

## 2018-09-25 NOTE — LETTER
September 25, 2018        Esha Tee  [address]  [place] [address]  Phone: [phone]  Fax: [phone]             [facility] [address] [place] [address]  Phone: [phone]   Patient: Cassandra Gonzales   MR Number: [id number]   YOB: 1955   Date of Visit: 9/25/2018       Dear Dr. Esha Tee    Thank you for referring Cassandra Gonzales to me for evaluation. Attached you will find relevant portions of my assessment and plan of care.    If you have questions, please do not hesitate to call me. I look forward to following Cassandra Gonzales along with you.    Sincerely,    Alexi Negro MD    Enclosure    If you would like to receive this communication electronically, please contact [email or web address] or [phone] to request [employer] Link access.    [employer] is a tool which provides read-only access to select patient information with whom you have a relationship. Its easy to use and provides real time access to review your patients record including encounter summaries, notes, results, and demographic information.    If you feel you have received this communication in error or would no longer like to receive these types of communications, please e-mail [email or web address]

## 2018-09-25 NOTE — PROGRESS NOTES
Subjective:   Ms. Gonzales is a 62 y.o. year old White female who received a  - brain death heart transplant on 3/25/16.       CMV status:   Donor: +  Recipient: -       HPI  Ms. Gonzales is a very pleasant 62 y.o. 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 She was asymptomatic and graft function was normal. She was given IV Solu Medrol 500 mg X 1. HTS staff and pathology staff reviewed the pathology, and there was no evidence of cellular necrosis, so she was sent home on a steroid pulse of Prednisone 30 mg bid X 2 days followed by 5 mg qd with EMBx done today likely to be followed by a slow taper. 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 She was started on Rapamune 2017 clinic visit.     Doing really well. Has no complaints.     Immuno regimen includes; Prograf , Cellcept 1000 mg BID, Everolimus  Off Prednisone.       2018. Echo preliminary EF normal       [facility] 18    LVEDP=7mmHg.    Normal coronary arteries by angiography.    CAV detected by IVUS with no significant change compared to 17 study.    3/15/18   1 - Normal left ventricular systolic function (EF 60-65%).     2 - Normal left ventricular diastolic function.     3 - Normal right ventricular systolic function .     4 - The estimated PA systolic pressure is greater than 26 mmHg.     5 - Trivial tricuspid regurgitation.     6 - No wall motion abnormalities.     7 - Concentric remodeling.   Review of Systems   Constitution: Negative. Negative for chills, decreased appetite, diaphoresis, fever, weakness, malaise/fatigue, night sweats, weight gain and weight loss.   Eyes: Negative.   "  Cardiovascular: Negative for chest pain, claudication, cyanosis, dyspnea on exertion, irregular heartbeat, leg swelling, near-syncope, orthopnea, palpitations, paroxysmal nocturnal dyspnea and syncope.   Respiratory: Negative for cough, hemoptysis and shortness of breath.    Endocrine: Negative.    Hematologic/Lymphatic: Negative.    Skin: Negative for color change, dry skin and nail changes.   Musculoskeletal: Negative.    Gastrointestinal: Negative.    Genitourinary: Negative.        Objective:   Blood pressure 122/74, pulse 88, height 5' 3" (1.6 m), weight 58.8 kg (129 lb 10.1 oz), last menstrual period 03/01/2007.body mass index is 22.96 kg/m².    Physical Exam   Constitutional: She is oriented to person, place, and time. Vital signs are normal. She appears well-developed and well-nourished.   HENT:   Head: Normocephalic.   Eyes: Pupils are equal, round, and reactive to light.   Neck: Neck supple. No JVD present.   Cardiovascular: Normal rate, regular rhythm, normal heart sounds and intact distal pulses. PMI is not displaced. Exam reveals no gallop and no friction rub.   No murmur heard.  Pulmonary/Chest: Effort normal and breath sounds normal. No respiratory distress. She has no wheezes. She has no rales.   Abdominal: Soft. Bowel sounds are normal. She exhibits no distension. There is no tenderness. There is no rebound.   Musculoskeletal: She exhibits no edema.   Neurological: She is alert and oriented to person, place, and time.   Nursing note and vitals reviewed.      Lab Results   Component Value Date    WBC 5.10 09/25/2018    HGB 11.7 (L) 09/25/2018    HCT 37.9 09/25/2018    MCV 88 09/25/2018     09/25/2018    CO2 23 09/25/2018    CREATININE 0.9 09/25/2018    CALCIUM 9.9 09/25/2018    ALBUMIN 4.4 09/25/2018    AST 30 09/25/2018     (H) 09/25/2018    ALT 21 09/25/2018    ALLOMAP See result image under hyperlink 06/22/2018       Lab Results   Component Value Date    INR 1.0 10/27/2016    INR " 1.0 10/26/2016    INR 1.0 10/25/2016       BNP   Date Value Ref Range Status   09/25/2018 108 (H) 0 - 99 pg/mL Final     Comment:     Values of less than 100 pg/ml are consistent with non-CHF populations.   06/22/2018 69 0 - 99 pg/mL Final     Comment:     Values of less than 100 pg/ml are consistent with non-CHF populations.   03/15/2018 63 0 - 99 pg/mL Final     Comment:     Values of less than 100 pg/ml are consistent with non-CHF populations.       No results found for: LDH    Tacrolimus Lvl   Date Value Ref Range Status   06/22/2018 3.8 (L) 5.0 - 15.0 ng/mL Final     Comment:     Testing performed by Liquid Chromatography-Tandem  Mass Spectrometry (LC-MS/MS).  This test was developed and its performance characteristics  determined by [facility], Department of Pathology  and Laboratory Medicine in a manner consistent with CLIA  requirements. It has not been cleared or approved by the [place]  Food and Drug Administration.  This test is used for clinical   purposes.  It should not be regarded as investigational or for  research.       No results found for: SIROLIMUS  No results found for: CYCLOSPORINE    No results found for this or any previous visit.  No results found for this or any previous visit.    Labs were reviewed with the patient.    Assessment:     1. Heart transplanted    2. Status post heart transplant    3. Essential hypertension    4. Cardiac allograft vasculopathy    5. Other cytomegaloviral diseases    6. Immunosuppression    7. Other neutropenia    8. Long-term use of immunosuppressant medication        Plan:   Doing well  Continue current immuno regimen  Follow Tacro and Everolimus levels    Return instructions as set forth by post transplant schedule or as needed:     Clinic: Return for labs and/or biopsy weekly the first month, every two weeks during month 2 and then monthly for the first year at the provider or coordinator's discretion. During the second year, return to clinic every 3  months. Post transplant year 3-5 return every 6 months. There will be a comprehensive post transplant evaluation every year that may include LHC/RHC/biopsy, stress test, echo, CXR, and other health screening exams.     In addition to the clinical assessment, I have ordered Allomap testing for this patient to assist in identification of moderate/severe acute cellular rejection (ACR) in a pt with stable Allograft function instead of endomyocardial biopsy.      Patient is reminded to call with any health changes as these can be early signs of transplant complications. Patient is advised to make sure any new medications or changes of old medications are discussed with a pharmacist or physician knowledgeable with transplant to avoid rejection/drug toxicity related to significant drug interactions.     UNOS Patient Status  Functional Status: 100% - Normal, no complaints, no evidence of disease  Physical Capacity: No Limitations  Working for Income: Unknown     Stephanie Lay MD

## 2018-09-26 LAB
CLASS I ANTIBODY COMMENTS - LUMINEX: NORMAL
CLASS II ANTIBODY COMMENTS - LUMINEX: NORMAL
DSA1 TESTING DATE: NORMAL
DSA12 TESTING DATE: NORMAL
DSA2 TESTING DATE: NORMAL
EVEROLIMUS BLD-MCNC: 4.1 NG/ML
SERUM COLLECTION DT - LUMINEX CLASS I: NORMAL
SERUM COLLECTION DT - LUMINEX CLASS II: NORMAL

## 2018-09-27 ENCOUNTER — CONFERENCE (OUTPATIENT)
Dept: TRANSPLANT | Facility: CLINIC | Age: 63
End: 2018-09-27

## 2018-09-27 LAB
ALLOMAP TEST SCORE: NORMAL
C1Q1 TESTING DATE: NORMAL
C1Q1 TESTING DATE: NORMAL
C1Q2 TESTING DATE: NORMAL
CLASS I ANTIBODY COMMENTS - LUMINEX: NORMAL
CLASS II ANTIBODY COMMENTS - LUMINEX: NORMAL
SERUM COLLECTION DT - LUMINEX CLASS I: NORMAL
SERUM COLLECTION DT - LUMINEX CLASS II: NORMAL

## 2018-09-27 NOTE — TELEPHONE ENCOUNTER
Patient seen for her 3 month follow up visit. Will go ahead biopsy since her allomap was up to 35. This is the highest she has been since transplant.     Otherwise, no changes for now.

## 2018-09-28 ENCOUNTER — TELEPHONE (OUTPATIENT)
Dept: TRANSPLANT | Facility: CLINIC | Age: 63
End: 2018-09-28

## 2018-09-28 DIAGNOSIS — Z94.1 HEART REPLACED BY TRANSPLANT: Primary | ICD-10-CM

## 2018-09-28 NOTE — TELEPHONE ENCOUNTER
Called pt and reviewed results from chart review. Per review pt's allomap score was 35 ( 29) highest was 34. Per review t instructed to get EGBX next week. Pt verbalized understanding . Also, pt is going to get local tacro level at [facility] on Monday. Verbalized understanding . Informed pt that I would call back with times.       returned call to pt, No answer. LM on  with times ant date for biopsy and labs

## 2018-10-01 ENCOUNTER — PATIENT MESSAGE (OUTPATIENT)
Dept: TRANSPLANT | Facility: CLINIC | Age: 63
End: 2018-10-01

## 2018-10-02 DIAGNOSIS — R91.8 OTHER NONSPECIFIC ABNORMAL FINDING OF LUNG FIELD: ICD-10-CM

## 2018-10-02 DIAGNOSIS — Z94.1 S/P ORTHOTOPIC HEART TRANSPLANT: Primary | ICD-10-CM

## 2018-10-03 ENCOUNTER — HOSPITAL ENCOUNTER (OUTPATIENT)
Facility: HOSPITAL | Age: 63
Discharge: HOME | End: 2018-10-03
Attending: INTERNAL MEDICINE | Admitting: INTERNAL MEDICINE
Payer: COMMERCIAL

## 2018-10-03 DIAGNOSIS — Z94.1 HEART TRANSPLANTED: Primary | ICD-10-CM

## 2018-10-03 LAB
DIASTOLIC DYSFUNCTION: NO
ESTIMATED PA SYSTOLIC PRESSURE: 13.54
MITRAL VALVE MOBILITY: NORMAL
RETIRED EF AND QEF - SEE NOTES: 60 (ref 55–65)
TRICUSPID VALVE REGURGITATION: NORMAL

## 2018-10-03 PROCEDURE — 88307 TISSUE EXAM BY PATHOLOGIST: CPT | Performed by: PATHOLOGY

## 2018-10-03 PROCEDURE — 88342 IMHCHEM/IMCYTCHM 1ST ANTB: CPT | Mod: 26,,, | Performed by: PATHOLOGY

## 2018-10-03 PROCEDURE — 25000003 PHARM REV CODE 250

## 2018-10-03 PROCEDURE — 27200044 CATH LAB PROCEDURE

## 2018-10-03 PROCEDURE — 88342 IMHCHEM/IMCYTCHM 1ST ANTB: CPT | Performed by: PATHOLOGY

## 2018-10-03 PROCEDURE — 93505 ENDOMYOCARDIAL BIOPSY: CPT | Mod: 26,,, | Performed by: INTERNAL MEDICINE

## 2018-10-03 PROCEDURE — 93307 TTE W/O DOPPLER COMPLETE: CPT | Mod: 26,,, | Performed by: INTERNAL MEDICINE

## 2018-10-03 PROCEDURE — 88346 IMFLUOR 1ST 1ANTB STAIN PX: CPT | Mod: 26,,, | Performed by: PATHOLOGY

## 2018-10-03 PROCEDURE — 88307 TISSUE EXAM BY PATHOLOGIST: CPT | Mod: 26,,, | Performed by: PATHOLOGY

## 2018-10-03 PROCEDURE — 88341 IMHCHEM/IMCYTCHM EA ADD ANTB: CPT | Mod: 26,,, | Performed by: PATHOLOGY

## 2018-10-03 PROCEDURE — 93307 TTE W/O DOPPLER COMPLETE: CPT

## 2018-10-03 NOTE — DISCHARGE INSTRUCTIONS
Call HTS in 2 days for result if you have not been contacted already    AFTER THE PROCEDURE:   -DO NOT DRINK OR EAT ANYTHING UNTIL NO LONGER HOARSE   -You may remove the bandage in 24 hours and wash with soap and water.   -You may shower, but do not soak in a tub for three days.   PRECAUTIONS FOR THE NEXT 24 HOURS:   -If you need to cough, sneeze, have a bowel movement, or bear down, hold pressure over your bandage.   -Do not  anything heavier than a gallon of milk(about 5 pounds)   -Avoid excessive bending over.   SYMPTOMS TO WATCH FOR AND REPORT TO YOUR DOCTOR:   -BLEEDING: hold pressure over the site until bleeding stops. Proceed to Emergency Room by ambulance (do not drive yourself) if unable to stop bleeding. Notify your doctor.   -HEMATOMA(hard bruise under the skin): Levon around the bruise if one develops. Call your doctor if it increases in size or if you have difficulty talking, swallowing, breathing or anything unusual.   SIGNS OF INFECTION:Fever (temperature over 100.5 F), pus or redness   -RASH   -CHEST PAIN OR SHORTNESS OF BREATH   You may call you coordinator in the Heart Failure/Heart Transplant/Pulmonary Hypertension Clinic at [phone] during normal business hours(Monday through Friday from 8 A.M. to 5 P.M.) After hours, call the Heart Transplant Service doctor on call at [phone].

## 2018-10-03 NOTE — DISCHARGE SUMMARY
[facility]  Discharge Note  Short Stay    Admit Date: 10/3/2018    Discharge Date and Time: 10/3/2018    Attending Physician: Clifford Ibarra Jr.,*     Discharge Provider: Clifford Ibarra Jr    Diagnoses:  Active Hospital Problems    Diagnosis  POA    *Status post heart transplant [Z94.1]  Not Applicable    Long-term use of immunosuppressant medication [Z79.899]  Not Applicable     Priority: 1 - High      Resolved Hospital Problems   No resolved problems to display.       Discharged Condition: stable    Hospital Course: Patient was admitted for an outpatient procedure and tolerated the procedure well with no complications.    Final Diagnoses: Same as principal problem.    Disposition: Home or Self Care    Follow up/Patient Instructions:    Medications:  Reconciled Home Medications:      Medication List      CONTINUE taking these medications    alendronate 70 MG tablet  Commonly known as:  FOSAMAX  Take 1 tablet (70 mg total) by mouth every 7 days.     amoxicillin 500 MG capsule  Commonly known as:  AMOXIL  Take 500 mg by mouth as needed. As needed for dental appointments     aspirin 81 MG EC tablet  Commonly known as:  ECOTRIN  Take 1 tablet (81 mg total) by mouth once daily.     atorvastatin 40 MG tablet  Commonly known as:  LIPITOR  Take 1 tablet (40 mg total) by mouth once daily.     calcium-vitamin D3 600 mg(1,500mg) -200 unit Tab  Commonly known as:  CALCIUM 600 WITH VITAMIN D3  Take 1 tablet by mouth once. for 1 dose     * dexamethasone 0.5 mg/5 mL Soln  Take by mouth daily as needed.     * dexamethasone 0.5 mg/5 mL Soln  15 DROPS (1 ml) OF DEXAMETHASONE MIXED WITH 6ML OF WATER. KEEP MEDICATION IN MOUTH FOR 2-3 MINUTES, THEN EXPECTORATE. REPEAT PROCEDURE 3-4 TIMES A DAY.     levothyroxine 75 MCG tablet  Commonly known as:  SYNTHROID  Take 1 tablet (75 mcg total) by mouth before breakfast.     lisinopril 5 MG tablet  Commonly known as:  PRINIVIL,ZESTRIL  Take 1 tablet (5 mg total) by mouth  once daily.     multivitamin tablet  Commonly known as:  THERAGRAN  Take 1 tablet by mouth once daily.     mycophenolate 250 mg Cap  Commonly known as:  CELLCEPT  Take 4 capsules (1,000 mg total) by mouth 2 (two) times daily.     nystatin 100,000 unit/mL suspension  Commonly known as:  MYCOSTATIN  Take 4 mLs (400,000 Units total) by mouth 4 (four) times daily as needed. Take as needed for oral mouth sores four times daily PRN .  ONLY FILL PER PATIENT' S REQUEST     tacrolimus 1 MG Cap  Commonly known as:  PROGRAF  Take 1 capsule (1 mg total) by mouth every 12 (twelve) hours.     ZORTRESS 0.5 mg tablet  Generic drug:  everolimus (immunosuppressive)  Take 2 tablets (1 mg total) by mouth 2 (two) times daily.          No discharge procedures on file.  Follow-up Information     [facility]. Call in 2 days.    Specialty:  Transplant  Contact information:  [facility]  [facility] [address] [phone]  Additional information:  3rd floor               Resume previous diet and activity; continue f/u with your physicians as directed prior to this procedure.  Call [facility] for result in 2 days.

## 2018-10-03 NOTE — OP NOTE
RV biopsy Lab Post Procedure Note    Patient tolerated the procedure well.   She did develop positional vertigo while supine as jugular vein entered.  Micro wire placed without difficulty and allowed to reposition head to relieve this symptom.  Once that was done then proceeded with placement of sheath and completed biopsy without difficulty.  6 specimens obtained--one likely scar  There were no complications.   Please see full report in CVIS for details.

## 2018-10-03 NOTE — INTERVAL H&P NOTE
Patient referred for RV biopsy.  She denies active CV symptoms  VS per nurse notes  JVP not elevated  S1,2 normal without S3  Lungs clear  Large legs but no pitting edema    Lab Results   Component Value Date    BNP 33 10/03/2018     10/03/2018    K 4.0 10/03/2018    MG 1.9 10/03/2018     10/03/2018    CO2 25 10/03/2018    BUN 18 10/03/2018    CREATININE 0.8 10/03/2018    GLU 92 10/03/2018    HGBA1C 5.4 03/23/2016    AST 30 10/03/2018    ALT 20 10/03/2018    ALBUMIN 4.3 10/03/2018    PROT 7.7 10/03/2018    BILITOT 0.5 10/03/2018    WBC 4.85 10/03/2018    HGB 11.8 (L) 10/03/2018    HCT 38.3 10/03/2018     10/03/2018    TSH 0.98 04/25/2018    CHOL 173 10/03/2018    HDL 75 10/03/2018    LDLCALC 81.8 10/03/2018    TRIG 81 10/03/2018     ASSES:  S/P OHT    PLAN:  RV biopsy with echo guidance  This procedure has been fully reviewed with the patient and written informed consent has been obtained.

## 2018-10-05 ENCOUNTER — PATIENT MESSAGE (OUTPATIENT)
Dept: TRANSPLANT | Facility: CLINIC | Age: 63
End: 2018-10-05

## 2018-10-05 ENCOUNTER — TELEPHONE (OUTPATIENT)
Dept: TRANSPLANT | Facility: CLINIC | Age: 63
End: 2018-10-05

## 2018-10-08 DIAGNOSIS — Z94.1 HEART REPLACED BY TRANSPLANT: Primary | ICD-10-CM

## 2018-10-12 ENCOUNTER — PATIENT MESSAGE (OUTPATIENT)
Dept: TRANSPLANT | Facility: CLINIC | Age: 63
End: 2018-10-12

## 2018-11-05 ENCOUNTER — PATIENT MESSAGE (OUTPATIENT)
Dept: TRANSPLANT | Facility: CLINIC | Age: 63
End: 2018-11-05

## 2018-11-13 ENCOUNTER — PATIENT MESSAGE (OUTPATIENT)
Dept: TRANSPLANT | Facility: CLINIC | Age: 63
End: 2018-11-13

## 2018-11-28 ENCOUNTER — PATIENT MESSAGE (OUTPATIENT)
Dept: TRANSPLANT | Facility: CLINIC | Age: 63
End: 2018-11-28

## 2018-12-06 ENCOUNTER — PATIENT MESSAGE (OUTPATIENT)
Dept: TRANSPLANT | Facility: CLINIC | Age: 63
End: 2018-12-06

## 2018-12-17 RX ORDER — MYCOPHENOLATE MOFETIL 250 MG/1
1000 CAPSULE ORAL 2 TIMES DAILY
Qty: 240 CAPSULE | Refills: 11 | Status: SHIPPED | OUTPATIENT
Start: 2018-12-17 | End: 2019-12-11

## 2018-12-28 ENCOUNTER — HOSPITAL ENCOUNTER (OUTPATIENT)
Dept: CARDIOLOGY | Facility: CLINIC | Age: 63
Discharge: HOME | End: 2018-12-28
Attending: INTERNAL MEDICINE
Payer: COMMERCIAL

## 2018-12-28 ENCOUNTER — OFFICE VISIT (OUTPATIENT)
Dept: TRANSPLANT | Facility: CLINIC | Age: 63
End: 2018-12-28
Payer: COMMERCIAL

## 2018-12-28 ENCOUNTER — LAB VISIT (OUTPATIENT)
Dept: LAB | Facility: HOSPITAL | Age: 63
End: 2018-12-28
Attending: INTERNAL MEDICINE
Payer: COMMERCIAL

## 2018-12-28 VITALS
HEART RATE: 84 BPM | DIASTOLIC BLOOD PRESSURE: 77 MMHG | WEIGHT: 129 LBS | HEIGHT: 63 IN | SYSTOLIC BLOOD PRESSURE: 131 MMHG | BODY MASS INDEX: 22.86 KG/M2

## 2018-12-28 VITALS
BODY MASS INDEX: 22.77 KG/M2 | DIASTOLIC BLOOD PRESSURE: 69 MMHG | SYSTOLIC BLOOD PRESSURE: 119 MMHG | HEART RATE: 97 BPM | HEIGHT: 63 IN | WEIGHT: 128.5 LBS

## 2018-12-28 DIAGNOSIS — Z79.899 ENCOUNTER FOR LONG-TERM (CURRENT) USE OF MEDICATIONS: ICD-10-CM

## 2018-12-28 DIAGNOSIS — Z94.1 STATUS POST HEART TRANSPLANT: ICD-10-CM

## 2018-12-28 DIAGNOSIS — Z94.1 HEART REPLACED BY TRANSPLANT: ICD-10-CM

## 2018-12-28 DIAGNOSIS — D64.9 ANEMIA, UNSPECIFIED TYPE: ICD-10-CM

## 2018-12-28 DIAGNOSIS — T86.290 CARDIAC ALLOGRAFT VASCULOPATHY: ICD-10-CM

## 2018-12-28 DIAGNOSIS — E03.9 HYPOTHYROIDISM (ACQUIRED): ICD-10-CM

## 2018-12-28 DIAGNOSIS — T86.20 COMPLICATION OF HEART TRANSPLANT, UNSPECIFIED COMPLICATION: ICD-10-CM

## 2018-12-28 DIAGNOSIS — Z79.60 LONG-TERM USE OF IMMUNOSUPPRESSANT MEDICATION: ICD-10-CM

## 2018-12-28 DIAGNOSIS — K13.79 RECURRENT ORAL ULCERS: ICD-10-CM

## 2018-12-28 DIAGNOSIS — Z94.1 HEART TRANSPLANTED: Primary | ICD-10-CM

## 2018-12-28 DIAGNOSIS — T38.0X5D ADVERSE EFFECT OF GLUCOCORTICOID OR SYNTHETIC ANALOGUE, SUBSEQUENT ENCOUNTER: ICD-10-CM

## 2018-12-28 LAB
ALBUMIN SERPL BCP-MCNC: 3.6 G/DL
ALP SERPL-CCNC: 65 U/L
ALT SERPL W/O P-5'-P-CCNC: 30 U/L
ANION GAP SERPL CALC-SCNC: 8 MMOL/L
ASCENDING AORTA: 3.14 CM
AST SERPL-CCNC: 40 U/L
AV INDEX (PROSTH): 0.87
AV MEAN GRADIENT: 2.67 MMHG
AV PEAK GRADIENT: 4.16 MMHG
AV VALVE AREA: 3.84 CM2
BASOPHILS # BLD AUTO: 0.02 K/UL
BASOPHILS NFR BLD: 0.5 %
BILIRUB SERPL-MCNC: 0.5 MG/DL
BNP SERPL-MCNC: 47 PG/ML
BSA FOR ECHO PROCEDURE: 1.61 M2
BUN SERPL-MCNC: 14 MG/DL
CALCIUM SERPL-MCNC: 9 MG/DL
CHLORIDE SERPL-SCNC: 105 MMOL/L
CHOLEST SERPL-MCNC: 143 MG/DL
CHOLEST/HDLC SERPL: 2.4 {RATIO}
CO2 SERPL-SCNC: 27 MMOL/L
CREAT SERPL-MCNC: 0.8 MG/DL
CV ECHO LV RWT: 0.39 CM
DIFFERENTIAL METHOD: ABNORMAL
DOP CALC AO PEAK VEL: 1.02 M/S
DOP CALC AO VTI: 20.25 CM
DOP CALC LVOT AREA: 4.41 CM2
DOP CALC LVOT DIAMETER: 2.37 CM
DOP CALC LVOT STROKE VOLUME: 77.69 CM3
DOP CALCLVOT PEAK VEL VTI: 17.62 CM
E WAVE DECELERATION TIME: 145.61 MSEC
E/A RATIO: 2.63
E/E' RATIO: 15.07
ECHO LV POSTERIOR WALL: 0.83 CM (ref 0.6–1.1)
EOSINOPHIL # BLD AUTO: 0.1 K/UL
EOSINOPHIL NFR BLD: 2.5 %
ERYTHROCYTE [DISTWIDTH] IN BLOOD BY AUTOMATED COUNT: 14.3 %
EST. GFR  (AFRICAN AMERICAN): >60 ML/MIN/1.73 M^2
EST. GFR  (NON AFRICAN AMERICAN): >60 ML/MIN/1.73 M^2
FRACTIONAL SHORTENING: 55 % (ref 28–44)
GLUCOSE SERPL-MCNC: 88 MG/DL
HCT VFR BLD AUTO: 34 %
HDLC SERPL-MCNC: 60 MG/DL
HDLC SERPL: 42 %
HGB BLD-MCNC: 10.2 G/DL
IMM GRANULOCYTES # BLD AUTO: 0 K/UL
IMM GRANULOCYTES NFR BLD AUTO: 0 %
INTERVENTRICULAR SEPTUM: 0.82 CM (ref 0.6–1.1)
IVRT: 0.12 MSEC
LDLC SERPL CALC-MCNC: 67.4 MG/DL
LEFT INTERNAL DIMENSION IN SYSTOLE: 1.93 CM (ref 2.1–4)
LEFT VENTRICLE DIASTOLIC VOLUME INDEX: 51.51 ML/M2
LEFT VENTRICLE DIASTOLIC VOLUME: 82.55 ML
LEFT VENTRICLE MASS INDEX: 68.2 G/M2
LEFT VENTRICLE SYSTOLIC VOLUME INDEX: 7.3 ML/M2
LEFT VENTRICLE SYSTOLIC VOLUME: 11.62 ML
LEFT VENTRICULAR INTERNAL DIMENSION IN DIASTOLE: 4.29 CM (ref 3.5–6)
LEFT VENTRICULAR MASS: 109.28 G
LV LATERAL E/E' RATIO: 11.3
LV SEPTAL E/E' RATIO: 22.6
LYMPHOCYTES # BLD AUTO: 1 K/UL
LYMPHOCYTES NFR BLD: 27.3 %
MAGNESIUM SERPL-MCNC: 1.7 MG/DL
MCH RBC QN AUTO: 26.6 PG
MCHC RBC AUTO-ENTMCNC: 30 G/DL
MCV RBC AUTO: 89 FL
MONOCYTES # BLD AUTO: 0.6 K/UL
MONOCYTES NFR BLD: 15 %
MV PEAK A VEL: 0.43 M/S
MV PEAK E VEL: 1.13 M/S
NEUTROPHILS # BLD AUTO: 2 K/UL
NEUTROPHILS NFR BLD: 54.7 %
NONHDLC SERPL-MCNC: 83 MG/DL
NRBC BLD-RTO: 0 /100 WBC
PISA TR MAX VEL: 2.19 M/S
PLATELET # BLD AUTO: 210 K/UL
PMV BLD AUTO: 10 FL
POTASSIUM SERPL-SCNC: 4.3 MMOL/L
PROT SERPL-MCNC: 6.9 G/DL
PULM VEIN S/D RATIO: 0.62
PV PEAK D VEL: 0.71 M/S
PV PEAK S VEL: 0.44 M/S
RA PRESSURE: 3 MMHG
RBC # BLD AUTO: 3.84 M/UL
RIGHT VENTRICULAR END-DIASTOLIC DIMENSION: 3.33 CM
RV TISSUE DOPPLER FREE WALL SYSTOLIC VELOCITY 1 (APICAL 4 CHAMBER VIEW): 8.17 M/S
SINUS: 3.5 CM
SODIUM SERPL-SCNC: 140 MMOL/L
STJ: 2.94 CM
TACROLIMUS BLD-MCNC: 3.1 NG/ML
TDI LATERAL: 0.1
TDI SEPTAL: 0.05
TDI: 0.08
TR MAX PG: 19.18 MMHG
TRICUSPID ANNULAR PLANE SYSTOLIC EXCURSION: 0.83 CM
TRIGL SERPL-MCNC: 78 MG/DL
TV REST PULMONARY ARTERY PRESSURE: 22 MMHG
WBC # BLD AUTO: 3.66 K/UL

## 2018-12-28 PROCEDURE — 3074F PR MOST RECENT SYSTOLIC BLOOD PRESSURE < 130 MM HG: ICD-10-PCS | Mod: CPTII,S$GLB,, | Performed by: INTERNAL MEDICINE

## 2018-12-28 PROCEDURE — 3078F PR MOST RECENT DIASTOLIC BLOOD PRESSURE < 80 MM HG: ICD-10-PCS | Mod: CPTII,S$GLB,, | Performed by: INTERNAL MEDICINE

## 2018-12-28 PROCEDURE — 3078F DIAST BP <80 MM HG: CPT | Mod: CPTII,S$GLB,, | Performed by: INTERNAL MEDICINE

## 2018-12-28 PROCEDURE — 3008F PR BODY MASS INDEX (BMI) DOCUMENTED: ICD-10-PCS | Mod: CPTII,S$GLB,, | Performed by: INTERNAL MEDICINE

## 2018-12-28 PROCEDURE — 80169 DRUG ASSAY EVEROLIMUS: CPT

## 2018-12-28 PROCEDURE — 3074F SYST BP LT 130 MM HG: CPT | Mod: CPTII,S$GLB,, | Performed by: INTERNAL MEDICINE

## 2018-12-28 PROCEDURE — 80197 ASSAY OF TACROLIMUS: CPT

## 2018-12-28 PROCEDURE — 36415 COLL VENOUS BLD VENIPUNCTURE: CPT

## 2018-12-28 PROCEDURE — 86832 HLA CLASS I HIGH DEFIN QUAL: CPT | Mod: 91,PO

## 2018-12-28 PROCEDURE — 83880 ASSAY OF NATRIURETIC PEPTIDE: CPT

## 2018-12-28 PROCEDURE — 99214 PR OFFICE/OUTPT VISIT, EST, LEVL IV, 30-39 MIN: ICD-10-PCS | Mod: S$GLB,,, | Performed by: INTERNAL MEDICINE

## 2018-12-28 PROCEDURE — 86833 HLA CLASS II HIGH DEFIN QUAL: CPT | Mod: 91,PO

## 2018-12-28 PROCEDURE — 85025 COMPLETE CBC W/AUTO DIFF WBC: CPT

## 2018-12-28 PROCEDURE — 86832 HLA CLASS I HIGH DEFIN QUAL: CPT | Mod: PO

## 2018-12-28 PROCEDURE — 80061 LIPID PANEL: CPT

## 2018-12-28 PROCEDURE — 80053 COMPREHEN METABOLIC PANEL: CPT

## 2018-12-28 PROCEDURE — 93306 TTE W/DOPPLER COMPLETE: CPT | Mod: S$GLB,,, | Performed by: INTERNAL MEDICINE

## 2018-12-28 PROCEDURE — 99214 OFFICE O/P EST MOD 30 MIN: CPT | Mod: S$GLB,,, | Performed by: INTERNAL MEDICINE

## 2018-12-28 PROCEDURE — 83735 ASSAY OF MAGNESIUM: CPT

## 2018-12-28 PROCEDURE — 86833 HLA CLASS II HIGH DEFIN QUAL: CPT | Mod: PO

## 2018-12-28 PROCEDURE — 3008F BODY MASS INDEX DOCD: CPT | Mod: CPTII,S$GLB,, | Performed by: INTERNAL MEDICINE

## 2018-12-28 PROCEDURE — 99999 PR PBB SHADOW E&M-EST. PATIENT-LVL III: ICD-10-PCS | Mod: PBBFAC,,, | Performed by: INTERNAL MEDICINE

## 2018-12-28 PROCEDURE — 99999 PR PBB SHADOW E&M-EST. PATIENT-LVL III: CPT | Mod: PBBFAC,,, | Performed by: INTERNAL MEDICINE

## 2018-12-28 PROCEDURE — 86977 RBC SERUM PRETX INCUBJ/INHIB: CPT | Mod: 91,PO

## 2018-12-28 NOTE — LETTER
January 8, 2019        Esha Tee  [address]  [place] [address]  Phone: [phone]  Fax: [phone]             [facility] [address] [place] [address]  Phone: [phone]   Patient: Cassandra Gonzales   MR Number: [id number]   YOB: 1955   Date of Visit: 12/28/2018       Dear Dr. Esha Tee    Thank you for referring Cassandra Gonzales to me for evaluation. Attached you will find relevant portions of my assessment and plan of care.    If you have questions, please do not hesitate to call me. I look forward to following Cassandra Gonzales along with you.    Sincerely,    Alyssa Negron MD    Enclosure    If you would like to receive this communication electronically, please contact [email or web address] or [phone] to request [employer] access.    [employer] is a tool which provides read-only access to select patient information with whom you have a relationship. Its easy to use and provides real time access to review your patients record including encounter summaries, notes, results, and demographic information.    If you feel you have received this communication in error or would no longer like to receive these types of communications, please e-mail [email or web address]        error

## 2018-12-29 LAB — EVEROLIMUS BLD-MCNC: 3.5 NG/ML

## 2018-12-31 LAB — ALLOMAP TEST SCORE: NORMAL

## 2019-01-02 LAB
CLASS I ANTIBODY COMMENTS - LUMINEX: NORMAL
CLASS II ANTIBODY COMMENTS - LUMINEX: NORMAL
DSA1 TESTING DATE: NORMAL
DSA12 TESTING DATE: NORMAL
DSA2 TESTING DATE: NORMAL
SERUM COLLECTION DT - LUMINEX CLASS I: NORMAL
SERUM COLLECTION DT - LUMINEX CLASS II: NORMAL

## 2019-01-04 ENCOUNTER — TELEPHONE (OUTPATIENT)
Dept: TRANSPLANT | Facility: CLINIC | Age: 64
End: 2019-01-04

## 2019-01-04 NOTE — TELEPHONE ENCOUNTER
Called pt. No answer. LM on VM regarding chart review with Dr. Negron. Aloomap level was 36- pt's highest. Per review Dr. Negron instructed pt to get EGBX. Instructed pt to call back with available times and questions if any.

## 2019-01-07 ENCOUNTER — PATIENT MESSAGE (OUTPATIENT)
Dept: TRANSPLANT | Facility: CLINIC | Age: 64
End: 2019-01-07

## 2019-01-07 DIAGNOSIS — Z94.1 HEART REPLACED BY TRANSPLANT: Primary | ICD-10-CM

## 2019-01-07 DIAGNOSIS — Z94.1 HEART TRANSPLANTED: Primary | ICD-10-CM

## 2019-01-09 NOTE — H&P (VIEW-ONLY)
Subjective:   Ms. Gonzales is a 62 y.o. year old White female who received a  - brain death heart transplant on 3/25/16.       CMV status:   Donor: +  Recipient: -       HPI  Ms. Gonzales is a very pleasant 62 y.o. 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 She had a ISHLT 2 rejection nearly 2 years ago, treated with IV solumedrol and oral steroids. Otherwise is returning for her 3rd post-transplant annual evaluation in 2019. She feels great, denies any cardiopulmonary complaints, went on her first airplane ride in a very long time. Patient denies N/V/F/C, lightheadedness, dizziness, PND, orthopnea, LE edema, abdominal pain, abdominal pressure, chest pain, chest pressure, syncope, pre-syncope.    Immuno regimen includes; Prograf , Cellcept 1000 mg BID, Everolimus  Off Prednisone.     TTE 18:  · S/p orthotopic heart transplant.  · Normal left ventricular systolic function. The estimated ejection fraction is 65%  · Indeterminate left ventricular diastolic function.  · No wall motion abnormalities.  · Normal right ventricular systolic function.  · The estimated PA systolic pressure is 22 mm Hg  · Normal central venous pressure (3 mm Hg).    [facility] 18    LVEDP=7mmHg.    Normal coronary arteries by angiography.    CAV detected by IVUS with no significant change compared to 17 study.    Review of Systems   Constitution: Negative. Negative for chills, decreased appetite, diaphoresis, fever, weakness, malaise/fatigue, night sweats, weight gain and weight loss.   Eyes: Negative.    Cardiovascular: Negative for chest pain, claudication, cyanosis, dyspnea on exertion, irregular heartbeat, leg swelling, near-syncope, orthopnea, palpitations, paroxysmal nocturnal dyspnea and syncope.   Respiratory: Negative for cough,  "hemoptysis and shortness of breath.    Endocrine: Negative.    Hematologic/Lymphatic: Negative.    Skin: Negative for color change, dry skin and nail changes.   Musculoskeletal: Negative.    Gastrointestinal: Negative.    Genitourinary: Negative.        Objective:   Blood pressure 119/69, pulse 97, height 5' 3" (1.6 m), weight 58.3 kg (128 lb 8.5 oz), last menstrual period 03/01/2007.body mass index is 22.77 kg/m².    Physical Exam   Constitutional: She is oriented to person, place, and time. Vital signs are normal. She appears well-developed and well-nourished.   HENT:   Head: Normocephalic.   Eyes: Pupils are equal, round, and reactive to light.   Neck: Neck supple. No JVD present.   Cardiovascular: Normal rate, regular rhythm, normal heart sounds and intact distal pulses. PMI is not displaced. Exam reveals no gallop and no friction rub.   No murmur heard.  Pulmonary/Chest: Effort normal and breath sounds normal. No respiratory distress. She has no wheezes. She has no rales.   Abdominal: Soft. Bowel sounds are normal. She exhibits no distension. There is no tenderness. There is no rebound.   Musculoskeletal: She exhibits no edema.   Neurological: She is alert and oriented to person, place, and time.   Nursing note and vitals reviewed.      Lab Results   Component Value Date    WBC 3.66 (L) 12/28/2018    HGB 10.2 (L) 12/28/2018    HCT 34.0 (L) 12/28/2018    MCV 89 12/28/2018     12/28/2018    CO2 27 12/28/2018    CREATININE 0.8 12/28/2018    CALCIUM 9.0 12/28/2018    ALBUMIN 3.6 12/28/2018    AST 40 12/28/2018    BNP 47 12/28/2018    ALT 30 12/28/2018    ALLOMAP See result image under hyperlink 12/28/2018       Lab Results   Component Value Date    INR 1.0 10/27/2016    INR 1.0 10/26/2016    INR 1.0 10/25/2016       BNP   Date Value Ref Range Status   12/28/2018 47 0 - 99 pg/mL Final     Comment:     Values of less than 100 pg/ml are consistent with non-CHF populations.   10/03/2018 33 0 - 99 pg/mL Final     " Comment:     Values of less than 100 pg/ml are consistent with non-CHF populations.   09/25/2018 108 (H) 0 - 99 pg/mL Final     Comment:     Values of less than 100 pg/ml are consistent with non-CHF populations.       No results found for: LDH    Tacrolimus Lvl   Date Value Ref Range Status   12/28/2018 3.1 (L) 5.0 - 15.0 ng/mL Final     Comment:     Testing performed by Liquid Chromatography-Tandem  Mass Spectrometry (LC-MS/MS).  This test was developed and its performance characteristics  determined by [facility], Department of Pathology  and Laboratory Medicine in a manner consistent with CLIA  requirements. It has not been cleared or approved by the [place]  Food and Drug Administration.  This test is used for clinical   purposes.  It should not be regarded as investigational or for  research.       Labs were reviewed with the patient.    Assessment:     1. Heart transplanted    2. Cardiac allograft vasculopathy    3. Status post heart transplant    4. Recurrent oral ulcers    5. Hypothyroidism (acquired)    6. Long-term use of immunosuppressant medication    7. Adverse effect of glucocorticoid or synthetic analogue, subsequent encounter    8. Anemia, unspecified type        Plan:     Patient is doing extremely well, feels great.   Will complete her 3rd post-transplant annual evaluation next week.   No changes today. Will see her at her annual next year.   Follow up on immunosuppression and adjust as indicated.       Return instructions as set forth by post transplant schedule or as needed:     Clinic: Return for labs and/or biopsy weekly the first month, every two weeks during month 2 and then monthly for the first year at the provider or coordinator's discretion. During the second year, return to clinic every 3 months. Post transplant year 3-5 return every 6 months. There will be a comprehensive post transplant evaluation every year that may include LHC/RHC/biopsy, stress test, echo, CXR, and other  health screening exams.     In addition to the clinical assessment, I have ordered Allomap testing for this patient to assist in identification of moderate/severe acute cellular rejection (ACR) in a pt with stable Allograft function instead of endomyocardial biopsy.      Patient is reminded to call with any health changes as these can be early signs of transplant complications. Patient is advised to make sure any new medications or changes of old medications are discussed with a pharmacist or physician knowledgeable with transplant to avoid rejection/drug toxicity related to significant drug interactions.     UNOS Patient Status  Functional Status: 100% - Normal, no complaints, no evidence of disease  Physical Capacity: No Limitations  Working for Income: Unknown     Stephanie Lay MD

## 2019-01-09 NOTE — PROGRESS NOTES
Subjective:   Ms. Gonzales is a 62 y.o. year old White female who received a  - brain death heart transplant on 3/25/16.       CMV status:   Donor: +  Recipient: -       HPI  Ms. Gonzales is a very pleasant 62 y.o. 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 She had a ISHLT 2 rejection nearly 2 years ago, treated with IV solumedrol and oral steroids. Otherwise is returning for her 3rd post-transplant annual evaluation in 2019. She feels great, denies any cardiopulmonary complaints, went on her first airplane ride in a very long time. Patient denies N/V/F/C, lightheadedness, dizziness, PND, orthopnea, LE edema, abdominal pain, abdominal pressure, chest pain, chest pressure, syncope, pre-syncope.    Immuno regimen includes; Prograf , Cellcept 1000 mg BID, Everolimus  Off Prednisone.     TTE 18:  · S/p orthotopic heart transplant.  · Normal left ventricular systolic function. The estimated ejection fraction is 65%  · Indeterminate left ventricular diastolic function.  · No wall motion abnormalities.  · Normal right ventricular systolic function.  · The estimated PA systolic pressure is 22 mm Hg  · Normal central venous pressure (3 mm Hg).    [facility] 18    LVEDP=7mmHg.    Normal coronary arteries by angiography.    CAV detected by IVUS with no significant change compared to 17 study.    Review of Systems   Constitution: Negative. Negative for chills, decreased appetite, diaphoresis, fever, weakness, malaise/fatigue, night sweats, weight gain and weight loss.   Eyes: Negative.    Cardiovascular: Negative for chest pain, claudication, cyanosis, dyspnea on exertion, irregular heartbeat, leg swelling, near-syncope, orthopnea, palpitations, paroxysmal nocturnal dyspnea and syncope.   Respiratory: Negative for cough,  "hemoptysis and shortness of breath.    Endocrine: Negative.    Hematologic/Lymphatic: Negative.    Skin: Negative for color change, dry skin and nail changes.   Musculoskeletal: Negative.    Gastrointestinal: Negative.    Genitourinary: Negative.        Objective:   Blood pressure 119/69, pulse 97, height 5' 3" (1.6 m), weight 58.3 kg (128 lb 8.5 oz), last menstrual period 03/01/2007.body mass index is 22.77 kg/m².    Physical Exam   Constitutional: She is oriented to person, place, and time. Vital signs are normal. She appears well-developed and well-nourished.   HENT:   Head: Normocephalic.   Eyes: Pupils are equal, round, and reactive to light.   Neck: Neck supple. No JVD present.   Cardiovascular: Normal rate, regular rhythm, normal heart sounds and intact distal pulses. PMI is not displaced. Exam reveals no gallop and no friction rub.   No murmur heard.  Pulmonary/Chest: Effort normal and breath sounds normal. No respiratory distress. She has no wheezes. She has no rales.   Abdominal: Soft. Bowel sounds are normal. She exhibits no distension. There is no tenderness. There is no rebound.   Musculoskeletal: She exhibits no edema.   Neurological: She is alert and oriented to person, place, and time.   Nursing note and vitals reviewed.      Lab Results   Component Value Date    WBC 3.66 (L) 12/28/2018    HGB 10.2 (L) 12/28/2018    HCT 34.0 (L) 12/28/2018    MCV 89 12/28/2018     12/28/2018    CO2 27 12/28/2018    CREATININE 0.8 12/28/2018    CALCIUM 9.0 12/28/2018    ALBUMIN 3.6 12/28/2018    AST 40 12/28/2018    BNP 47 12/28/2018    ALT 30 12/28/2018    ALLOMAP See result image under hyperlink 12/28/2018       Lab Results   Component Value Date    INR 1.0 10/27/2016    INR 1.0 10/26/2016    INR 1.0 10/25/2016       BNP   Date Value Ref Range Status   12/28/2018 47 0 - 99 pg/mL Final     Comment:     Values of less than 100 pg/ml are consistent with non-CHF populations.   10/03/2018 33 0 - 99 pg/mL Final     " Comment:     Values of less than 100 pg/ml are consistent with non-CHF populations.   09/25/2018 108 (H) 0 - 99 pg/mL Final     Comment:     Values of less than 100 pg/ml are consistent with non-CHF populations.       No results found for: LDH    Tacrolimus Lvl   Date Value Ref Range Status   12/28/2018 3.1 (L) 5.0 - 15.0 ng/mL Final     Comment:     Testing performed by Liquid Chromatography-Tandem  Mass Spectrometry (LC-MS/MS).  This test was developed and its performance characteristics  determined by [facility], Department of Pathology  and Laboratory Medicine in a manner consistent with CLIA  requirements. It has not been cleared or approved by the [place]  Food and Drug Administration.  This test is used for clinical   purposes.  It should not be regarded as investigational or for  research.       Labs were reviewed with the patient.    Assessment:     1. Heart transplanted    2. Cardiac allograft vasculopathy    3. Status post heart transplant    4. Recurrent oral ulcers    5. Hypothyroidism (acquired)    6. Long-term use of immunosuppressant medication    7. Adverse effect of glucocorticoid or synthetic analogue, subsequent encounter    8. Anemia, unspecified type        Plan:     Patient is doing extremely well, feels great.   Will complete her 3rd post-transplant annual evaluation next week.   No changes today. Will see her at her annual next year.   Follow up on immunosuppression and adjust as indicated.       Return instructions as set forth by post transplant schedule or as needed:     Clinic: Return for labs and/or biopsy weekly the first month, every two weeks during month 2 and then monthly for the first year at the provider or coordinator's discretion. During the second year, return to clinic every 3 months. Post transplant year 3-5 return every 6 months. There will be a comprehensive post transplant evaluation every year that may include LHC/RHC/biopsy, stress test, echo, CXR, and other  health screening exams.     In addition to the clinical assessment, I have ordered Allomap testing for this patient to assist in identification of moderate/severe acute cellular rejection (ACR) in a pt with stable Allograft function instead of endomyocardial biopsy.      Patient is reminded to call with any health changes as these can be early signs of transplant complications. Patient is advised to make sure any new medications or changes of old medications are discussed with a pharmacist or physician knowledgeable with transplant to avoid rejection/drug toxicity related to significant drug interactions.     UNOS Patient Status  Functional Status: 100% - Normal, no complaints, no evidence of disease  Physical Capacity: No Limitations  Working for Income: Unknown     Stephanie Lay MD

## 2019-01-15 ENCOUNTER — HOSPITAL ENCOUNTER (OUTPATIENT)
Facility: HOSPITAL | Age: 64
Discharge: HOME | End: 2019-01-15
Attending: INTERNAL MEDICINE | Admitting: INTERNAL MEDICINE
Payer: COMMERCIAL

## 2019-01-15 VITALS
HEIGHT: 63 IN | HEART RATE: 97 BPM | DIASTOLIC BLOOD PRESSURE: 81 MMHG | SYSTOLIC BLOOD PRESSURE: 136 MMHG | BODY MASS INDEX: 22.64 KG/M2 | WEIGHT: 127.75 LBS

## 2019-01-15 DIAGNOSIS — Z94.1 STATUS POST HEART TRANSPLANT: ICD-10-CM

## 2019-01-15 LAB
BSA FOR ECHO PROCEDURE: 1.6 M2
PISA TR MAX VEL: 2.48 M/S
SINUS: 3.2 CM
STJ: 2.8 CM
TR MAX PG: 24.6 MMHG

## 2019-01-15 PROCEDURE — 88342 IMHCHEM/IMCYTCHM 1ST ANTB: CPT | Performed by: PATHOLOGY

## 2019-01-15 PROCEDURE — 88342 IMHCHEM/IMCYTCHM 1ST ANTB: CPT | Mod: 26,,, | Performed by: PATHOLOGY

## 2019-01-15 PROCEDURE — 27201423 OPTIME MED/SURG SUP & DEVICES STERILE SUPPLY: Performed by: INTERNAL MEDICINE

## 2019-01-15 PROCEDURE — 88307 TISSUE SPECIMEN TO PATHOLOGY - SURGERY: ICD-10-PCS | Mod: 26,,, | Performed by: PATHOLOGY

## 2019-01-15 PROCEDURE — 93505 PR BIOPSY OF HEART LINING: ICD-10-PCS | Mod: 26,,, | Performed by: INTERNAL MEDICINE

## 2019-01-15 PROCEDURE — 93505 ENDOMYOCARDIAL BIOPSY: CPT | Mod: 26,,, | Performed by: INTERNAL MEDICINE

## 2019-01-15 PROCEDURE — 93505 ENDOMYOCARDIAL BIOPSY: CPT | Performed by: INTERNAL MEDICINE

## 2019-01-15 PROCEDURE — C1894 INTRO/SHEATH, NON-LASER: HCPCS | Performed by: INTERNAL MEDICINE

## 2019-01-15 PROCEDURE — 88307 TISSUE EXAM BY PATHOLOGIST: CPT | Mod: 26,,, | Performed by: PATHOLOGY

## 2019-01-15 PROCEDURE — 25000003 PHARM REV CODE 250: Performed by: INTERNAL MEDICINE

## 2019-01-15 PROCEDURE — 88342 TISSUE SPECIMEN TO PATHOLOGY - SURGERY: ICD-10-PCS | Mod: 26,,, | Performed by: PATHOLOGY

## 2019-01-15 RX ORDER — SODIUM CHLORIDE 0.9 G/100ML
IRRIGANT IRRIGATION
Status: DISCONTINUED | OUTPATIENT
Start: 2019-01-15 | End: 2019-01-15 | Stop reason: HOSPADM

## 2019-01-15 RX ORDER — LIDOCAINE HYDROCHLORIDE 20 MG/ML
INJECTION, SOLUTION INFILTRATION; PERINEURAL
Status: DISCONTINUED | OUTPATIENT
Start: 2019-01-15 | End: 2019-01-15 | Stop reason: HOSPADM

## 2019-01-15 NOTE — DISCHARGE SUMMARY
[facility]  Discharge Note  Short Stay    Admit Date: 1/15/2019    Discharge Date and Time: 1/15./2019    Attending Physician: Darren Brunson MD     Discharge Provider: Darren Brunson MD    Diagnoses: S/P OHTx    Discharged Condition: good    Hospital Course: Patient was admitted for an outpatient procedure and tolerated the procedure well with no complications.    Final Diagnoses: Same as principal problem.    Disposition: Home or Self Care    Follow up/Patient Instructions:    Medications:  Reconciled Home Medications:      Medication List      ASK your doctor about these medications    alendronate 70 MG tablet  Commonly known as:  FOSAMAX  Take 1 tablet (70 mg total) by mouth every 7 days.     amoxicillin 500 MG capsule  Commonly known as:  AMOXIL  Take 500 mg by mouth as needed. As needed for dental appointments     aspirin 81 MG EC tablet  Commonly known as:  ECOTRIN  Take 1 tablet (81 mg total) by mouth once daily.     atorvastatin 40 MG tablet  Commonly known as:  LIPITOR  Take 1 tablet (40 mg total) by mouth once daily.     calcium-vitamin D3 600 mg(1,500mg) -200 unit Tab  Commonly known as:  CALCIUM 600 WITH VITAMIN D3  Take 1 tablet by mouth once. for 1 dose     * dexamethasone 0.5 mg/5 mL Soln  Take by mouth daily as needed.     * dexamethasone 0.5 mg/5 mL Soln  15 DROPS (1 ml) OF DEXAMETHASONE MIXED WITH 6ML OF WATER. KEEP MEDICATION IN MOUTH FOR 2-3 MINUTES, THEN EXPECTORATE. REPEAT PROCEDURE 3-4 TIMES A DAY.     FLUZONE QUAD 1179-3755 (PF) 60 mcg (15 mcg x 4)/0.5 mL Syrg  Generic drug:  flu vac [id number] 36mos up(PF)  ADMINISTER AS PER DOCTORS ORDERS     levothyroxine 75 MCG tablet  Commonly known as:  SYNTHROID  Take 1 tablet (75 mcg total) by mouth before breakfast.     lisinopril 5 MG tablet  Commonly known as:  PRINIVIL,ZESTRIL  Take 1 tablet (5 mg total) by mouth once daily.     multivitamin tablet  Commonly known as:  THERAGRAN  Take 1 tablet by mouth once daily.     mycophenolate 250  mg Cap  Commonly known as:  CELLCEPT  Take 4 capsules (1,000 mg total) by mouth 2 (two) times daily.     tacrolimus 1 MG Cap  Commonly known as:  PROGRAF  Take 1 capsule (1 mg total) by mouth every 12 (twelve) hours.     ZORTRESS 0.5 mg tablet  Generic drug:  everolimus (immunosuppressive)  Take 2 tablets (1 mg total) by mouth 2 (two) times daily.         * This list has 2 medication(s) that are the same as other medications prescribed for you. Read the directions carefully, and ask your doctor or other care provider to review them with you.                  Discharge Procedure Orders (must include Diet, Follow-up, Activity):    Cardiac diet    Follow-up with Tx clinic    Activity as tolerated.    Darren Brunson MD

## 2019-01-15 NOTE — DISCHARGE INSTRUCTIONS

## 2019-01-15 NOTE — BRIEF OP NOTE
[facility]  Brief Operative Note  Cardiology    SUMMARY     Surgery Date: 1/15/2019     Surgeon(s) and Role:     * Darren Brunson MD - Primary    Assisting Surgeon: Álvaro Dial MD    Pre-op Diagnosis:  S/p OHTx    Post-op Diagnosis: s/p OHTx    Procedure Performed: Echo guided RV biopsy    Description of the findings of the procedure: Echo guided RV biopsy performed via the right IJ. Patient tolerated the procedure well. 3 myocardial pieces obtained. 2 sent for H&E and 1 sent for Immuno.           Specimens:   Specimen (12h ago, onward)    Start     Ordered    01/15/19 0924  Specimen to Pathology - Surgery  Once     Comments:  S/p OHTX to rule out rejection4 pieces from RV3 pieces for H&E1 piece for immunoflourescence     Start Status   01/15/19 0924 Collected (01/15/19 0926)       01/15/19 0924        Darren Brunson MD

## 2019-01-15 NOTE — INTERVAL H&P NOTE
Patient seen and examined. No interval change since last H&P. Here for an echo guided RV biopsy.  Access via the right IJ  7 Fr venous sheath  Risks and benefits of the procedure were explained to the patient. All questions were answered.  Consents were signed and in the chart.    Darren Brunson MD

## 2019-01-16 NOTE — H&P
Interventional Cardiology Consult Note    4/28/2017    SINDI Gonzales is a 62 y.o. female with a history significant for peripartum CMP s/p OHT March 2016 referred for surveillance coronary angiogram for CAV and RHC and biopsy. She has h/o HTN, hypothyroid and HLD.  She has been walking daily and keeping a track of her activity with a Fitbit.     She denies any CP, palpitations, BEASLEY, orthopnea, leg edema, syncopal events     Prior to her OHT, she had an MVRepair in 2004 and MVReplacement in 2007    Review of Systems   Constitution: Negative for chills, fever, weight gain and weight loss.   HENT: Negative.    Eyes: Negative.    Cardiovascular: Positive for chest pain. Negative for claudication, dyspnea on exertion, irregular heartbeat, leg swelling, near-syncope, orthopnea, palpitations, paroxysmal nocturnal dyspnia and syncope.   Respiratory: Negative for shortness of breath. Negative for cough.    Endocrine: Negative.    Hematologic/Lymphatic: Negative.    Skin: Negative for color change and rash.   Musculoskeletal: Negative.    Gastrointestinal: Negative for abdominal pain, change in bowel habit, constipation, diarrhea and heartburn.   Genitourinary: Negative.    Neurological: Negative for dizziness, light-headedness and loss of balance.   Psychiatric/Behavioral: Negative for altered mental status.    OBJECTIVE  No current facility-administered medications for this encounter.        Past Medical History:   Diagnosis Date    Anticoagulant long-term use     Cardiomyopathy, dilated, nonischemic 6/29/2015    CHF (congestive heart failure)     CHF (NYHA class III, ACC/AHA stage C) 6/29/2015    Encounter for blood transfusion     Hypertension     Hypothyroidism (acquired)     Peripartum cardiomyopathy 6/29/2015    Stroke     TIA in 12/2014       Past Surgical History:   Procedure Laterality Date    CARDIAC DEFIBRILLATOR PLACEMENT      CARDIAC DEFIBRILLATOR REMOVAL  march 2016     "CARDIAC VALVE REPLACEMENT       SECTION      heart transplanted  2016    right breast tumor         Review of patient's allergies indicates:  No Known Allergies    Family History   Problem Relation Age of Onset    Heart disease Mother     Hypertension Mother     Cancer Father     No Known Problems Sister     No Known Problems Brother     No Known Problems Maternal Aunt     No Known Problems Maternal Uncle     No Known Problems Paternal Aunt     No Known Problems Paternal Uncle     No Known Problems Maternal Grandmother     No Known Problems Maternal Grandfather     No Known Problems Paternal Grandmother     No Known Problems Paternal Grandfather     Anemia Neg Hx     Arrhythmia Neg Hx     Asthma Neg Hx     Clotting disorder Neg Hx     Fainting Neg Hx     Heart attack Neg Hx     Heart failure Neg Hx     Hyperlipidemia Neg Hx     Stroke Neg Hx     Atrial Septal Defect Neg Hx        Social History     Social History    Marital status:      Spouse name: N/A    Number of children: N/A    Years of education: N/A     Social History Main Topics    Smoking status: Never Smoker    Smokeless tobacco: None    Alcohol use No    Drug use: No    Sexual activity: Not Asked     Other Topics Concern    None     Social History Narrative       Vitals: /82 (BP Location: Left arm, Patient Position: Lying, BP Method: Automatic)  Pulse 87  Temp 98.2 °F (36.8 °C) (Oral)   Resp 18  Ht 5' 3" (1.6 m)  Wt 54.9 kg (121 lb)  LMP 2007  SpO2 100%  Breastfeeding? No  BMI 21.43 kg/m2  Physical Exam  Gen: NAD  Head/Eyes/Ears/Nose: NCAT, MMM, good dentition   Neck: No carotid bruits, no JVD  Lung: CTAB, no wheezes/rales/ronchi, symmetrical with respiration   Heart: Normal S1/S2, regular rate and rhythm, no murmurs/rubs/gallops  Abdomen: Soft, NT/ND, NABS, no masses, no HSM  Vascular Exam: 2+ carotid, 2+ radial, 2+ femoral, 2+ posterior tibial, 2+ dorsalis pedis, normal " Fady's test  Extremities: No LE edema bilaterally  Skin: Normal color and turgor. No wounds rashes, no petechia, no ecchymoses.   Neuro: AAOx3    Labs  Lab Results   Component Value Date    WBC 3.73 (L) 04/28/2017    HGB 11.5 (L) 04/28/2017    HCT 36.2 (L) 04/28/2017     04/28/2017    CHOL 161 03/22/2017    TRIG 71 03/22/2017    HDL 76 (H) 03/22/2017    ALT 23 03/22/2017    AST 39 03/22/2017     03/22/2017    K 3.8 03/22/2017     03/22/2017    CREATININE 0.9 03/22/2017    BUN 26 (H) 03/22/2017    CO2 24 03/22/2017    TSH 2.583 10/23/2016    INR 1.0 10/27/2016    HGBA1C 5.4 03/23/2016     No results found for: TROPONINI, CPKMB, CPK, MB    Results  Echo 3/22/17:   1 - Post-cardiac transplantation study.     2 - Concentric remodeling.     3 - Normal left ventricular systolic function (EF 60-65%).     4 - Normal right ventricular systolic function .     5 - Normal left ventricular diastolic function.     6 - The estimated PA systolic pressure is 22 mmHg.    ASSESSMENT  62 y.o. 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     PLAN  R/LHC with IVUS and EMBx. R CFV and R CFA. 7F and 6F sheaths. [place], JL4, and JR4.    The risks, benefits, and alternatives of cardiac catheterization and coronary angiography were dicussed with the patient. All questions were answered and informed consent was obtained.     no

## 2019-01-18 ENCOUNTER — PATIENT MESSAGE (OUTPATIENT)
Dept: TRANSPLANT | Facility: CLINIC | Age: 64
End: 2019-01-18

## 2019-02-08 ENCOUNTER — PATIENT MESSAGE (OUTPATIENT)
Dept: TRANSPLANT | Facility: CLINIC | Age: 64
End: 2019-02-08

## 2019-03-11 DIAGNOSIS — E78.5 HYPERLIPIDEMIA, UNSPECIFIED HYPERLIPIDEMIA TYPE: ICD-10-CM

## 2019-03-11 RX ORDER — ATORVASTATIN CALCIUM 40 MG/1
40 TABLET, FILM COATED ORAL DAILY
Qty: 30 TABLET | Refills: 11 | Status: SHIPPED | OUTPATIENT
Start: 2019-03-11 | End: 2020-03-12

## 2019-03-15 ENCOUNTER — PATIENT MESSAGE (OUTPATIENT)
Dept: TRANSPLANT | Facility: CLINIC | Age: 64
End: 2019-03-15

## 2019-03-19 ENCOUNTER — HOSPITAL ENCOUNTER (OUTPATIENT)
Dept: RADIOLOGY | Facility: HOSPITAL | Age: 64
Discharge: HOME | End: 2019-03-19
Attending: INTERNAL MEDICINE
Payer: COMMERCIAL

## 2019-03-19 ENCOUNTER — HOSPITAL ENCOUNTER (OUTPATIENT)
Dept: CARDIOLOGY | Facility: CLINIC | Age: 64
Discharge: HOME | End: 2019-03-19
Attending: INTERNAL MEDICINE
Payer: COMMERCIAL

## 2019-03-19 ENCOUNTER — TELEPHONE (OUTPATIENT)
Dept: TRANSPLANT | Facility: CLINIC | Age: 64
End: 2019-03-19

## 2019-03-19 ENCOUNTER — OFFICE VISIT (OUTPATIENT)
Dept: TRANSPLANT | Facility: CLINIC | Age: 64
End: 2019-03-19
Attending: INTERNAL MEDICINE
Payer: COMMERCIAL

## 2019-03-19 VITALS
BODY MASS INDEX: 22.69 KG/M2 | WEIGHT: 128.06 LBS | HEART RATE: 92 BPM | HEIGHT: 63 IN | DIASTOLIC BLOOD PRESSURE: 80 MMHG | SYSTOLIC BLOOD PRESSURE: 140 MMHG

## 2019-03-19 VITALS
HEART RATE: 81 BPM | SYSTOLIC BLOOD PRESSURE: 140 MMHG | BODY MASS INDEX: 22.68 KG/M2 | HEIGHT: 63 IN | WEIGHT: 128 LBS | DIASTOLIC BLOOD PRESSURE: 80 MMHG

## 2019-03-19 DIAGNOSIS — Z94.1 HEART TRANSPLANTED: ICD-10-CM

## 2019-03-19 DIAGNOSIS — T86.290 CARDIAC ALLOGRAFT VASCULOPATHY: ICD-10-CM

## 2019-03-19 DIAGNOSIS — I10 ESSENTIAL HYPERTENSION: ICD-10-CM

## 2019-03-19 DIAGNOSIS — E78.5 HYPERLIPIDEMIA LDL GOAL <70: ICD-10-CM

## 2019-03-19 DIAGNOSIS — Z94.1 HEART REPLACED BY TRANSPLANT: ICD-10-CM

## 2019-03-19 DIAGNOSIS — Z94.1 HEART TRANSPLANTED: Primary | ICD-10-CM

## 2019-03-19 DIAGNOSIS — E03.9 HYPOTHYROIDISM (ACQUIRED): ICD-10-CM

## 2019-03-19 DIAGNOSIS — Z79.60 LONG-TERM USE OF IMMUNOSUPPRESSANT MEDICATION: ICD-10-CM

## 2019-03-19 PROBLEM — L03.211 CELLULITIS, FACE: Status: RESOLVED | Noted: 2017-08-09 | Resolved: 2019-03-19

## 2019-03-19 PROBLEM — R22.0 LEFT FACIAL SWELLING: Status: RESOLVED | Noted: 2017-08-07 | Resolved: 2019-03-19
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
## 2019-03-19 LAB
ASCENDING AORTA: 3.09 CM
AV INDEX (PROSTH): 0.84
AV MEAN GRADIENT: 2.06 MMHG
AV PEAK GRADIENT: 2.89 MMHG
AV VALVE AREA: 3.69 CM2
AV VELOCITY RATIO: 0.95
BSA FOR ECHO PROCEDURE: 1.61 M2
CV ECHO LV RWT: 0.46 CM
DOP CALC AO PEAK VEL: 0.85 M/S
DOP CALC AO VTI: 18.54 CM
DOP CALC LVOT AREA: 4.37 CM2
DOP CALC LVOT DIAMETER: 2.36 CM
DOP CALC LVOT PEAK VEL: 0.81 M/S
DOP CALC LVOT STROKE VOLUME: 68.38 CM3
DOP CALCLVOT PEAK VEL VTI: 15.64 CM
E WAVE DECELERATION TIME: 165.29 MSEC
E/A RATIO: 1.91
E/E' RATIO: 10.35
ECHO LV POSTERIOR WALL: 0.87 CM (ref 0.6–1.1)
FRACTIONAL SHORTENING: 36 % (ref 28–44)
INTERVENTRICULAR SEPTUM: 0.72 CM (ref 0.6–1.1)
IVRT: 0.08 MSEC
LEFT INTERNAL DIMENSION IN SYSTOLE: 2.43 CM (ref 2.1–4)
LEFT VENTRICLE DIASTOLIC VOLUME INDEX: 38.76 ML/M2
LEFT VENTRICLE DIASTOLIC VOLUME: 62.01 ML
LEFT VENTRICLE MASS INDEX: 53.3 G/M2
LEFT VENTRICLE SYSTOLIC VOLUME INDEX: 13 ML/M2
LEFT VENTRICLE SYSTOLIC VOLUME: 20.86 ML
LEFT VENTRICULAR INTERNAL DIMENSION IN DIASTOLE: 3.8 CM (ref 3.5–6)
LEFT VENTRICULAR MASS: 85.23 G
LV LATERAL E/E' RATIO: 8.8
LV SEPTAL E/E' RATIO: 12.57
MV PEAK A VEL: 0.46 M/S
MV PEAK E VEL: 0.88 M/S
PISA TR MAX VEL: 2.28 M/S
RA MAJOR: 3.77 CM
RA PRESSURE: 3 MMHG
RA WIDTH: 3.35 CM
RIGHT VENTRICULAR END-DIASTOLIC DIMENSION: 2.87 CM
RV TISSUE DOPPLER FREE WALL SYSTOLIC VELOCITY 1 (APICAL 4 CHAMBER VIEW): 9.39 M/S
SINUS: 3.37 CM
STJ: 2.97 CM
TDI LATERAL: 0.1
TDI SEPTAL: 0.07
TDI: 0.09
TR MAX PG: 20.79 MMHG
TRICUSPID ANNULAR PLANE SYSTOLIC EXCURSION: 1.05 CM
TV REST PULMONARY ARTERY PRESSURE: 24 MMHG

## 2019-03-19 PROCEDURE — 71046 XR CHEST PA AND LATERAL: ICD-10-PCS | Mod: 26,,, | Performed by: RADIOLOGY

## 2019-03-19 PROCEDURE — 3077F SYST BP >= 140 MM HG: CPT | Mod: CPTII,S$GLB,, | Performed by: INTERNAL MEDICINE

## 2019-03-19 PROCEDURE — 3008F PR BODY MASS INDEX (BMI) DOCUMENTED: ICD-10-PCS | Mod: CPTII,S$GLB,, | Performed by: INTERNAL MEDICINE

## 2019-03-19 PROCEDURE — 99999 PR PBB SHADOW E&M-EST. PATIENT-LVL III: CPT | Mod: PBBFAC,,, | Performed by: INTERNAL MEDICINE

## 2019-03-19 PROCEDURE — 71046 X-RAY EXAM CHEST 2 VIEWS: CPT | Mod: 26,,, | Performed by: RADIOLOGY

## 2019-03-19 PROCEDURE — 3079F DIAST BP 80-89 MM HG: CPT | Mod: CPTII,S$GLB,, | Performed by: INTERNAL MEDICINE

## 2019-03-19 PROCEDURE — 99214 OFFICE O/P EST MOD 30 MIN: CPT | Mod: S$GLB,,, | Performed by: INTERNAL MEDICINE

## 2019-03-19 PROCEDURE — 99214 PR OFFICE/OUTPT VISIT, EST, LEVL IV, 30-39 MIN: ICD-10-PCS | Mod: S$GLB,,, | Performed by: INTERNAL MEDICINE

## 2019-03-19 PROCEDURE — 3079F PR MOST RECENT DIASTOLIC BLOOD PRESSURE 80-89 MM HG: ICD-10-PCS | Mod: CPTII,S$GLB,, | Performed by: INTERNAL MEDICINE

## 2019-03-19 PROCEDURE — 93306 TRANSTHORACIC ECHO (TTE) COMPLETE (CUPID ONLY): ICD-10-PCS | Mod: S$GLB,,, | Performed by: INTERNAL MEDICINE

## 2019-03-19 PROCEDURE — 93306 TTE W/DOPPLER COMPLETE: CPT | Mod: S$GLB,,, | Performed by: INTERNAL MEDICINE

## 2019-03-19 PROCEDURE — 3077F PR MOST RECENT SYSTOLIC BLOOD PRESSURE >= 140 MM HG: ICD-10-PCS | Mod: CPTII,S$GLB,, | Performed by: INTERNAL MEDICINE

## 2019-03-19 PROCEDURE — 99999 PR PBB SHADOW E&M-EST. PATIENT-LVL III: ICD-10-PCS | Mod: PBBFAC,,, | Performed by: INTERNAL MEDICINE

## 2019-03-19 PROCEDURE — 71046 X-RAY EXAM CHEST 2 VIEWS: CPT | Mod: TC,FY

## 2019-03-19 PROCEDURE — 3008F BODY MASS INDEX DOCD: CPT | Mod: CPTII,S$GLB,, | Performed by: INTERNAL MEDICINE

## 2019-03-19 NOTE — PROGRESS NOTES
Subjective:   Ms. Gonzales is a 62 y.o. year old White female who received a  - brain death heart transplant on 3/25/16.       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    Immuno regimen: Prograf , Cellcept 1000 mg BID, Everolimus  Off Prednisone.     LAST ECHO AND CATH   1/15/2019 ECHO Conclusion     · Post cardiac transplantation study.  · Limited study for RV biopsy.  · Normal left ventricular systolic function. The estimated ejection fraction is 65%  · Normal LV diastolic function.  · Normal right ventricular systolic function.  · Trace tricuspid regurgitation.  · No complications post RV biopsy.      [facility] 18    LVEDP=7mmHg.    Normal coronary arteries by angiography.    CAV detected by IVUS with no significant change compared to 17 study.    SHE COMES TODAY AND REPORTS THAT SHE IS ACTIVE, WORKING, GOES TO A GYM WHICH SHE EXERCISES WITH THE BIKE AND VARIOUS EXERCISES WITH OUT DIFFICULTY.  SHE PARTICIPATES FOR 1.5 HR 3 TIMES PER WEEK.    REPORTS HOME BLOOD PRESSURE IS GENERALLY 120-125 OVER 80 OR LESS.  SHE HAS NOT BEEN TAKING HER BLOOD PRESSURE THE PAST MONTH.     Review of Systems   Constitution: Negative for chills, fever, weakness, malaise/fatigue, night sweats, weight gain and weight loss.   HENT: Negative for congestion, hoarse voice, odynophagia and sore throat.         Dental exam up to date--q 6 months   Cardiovascular: Negative for chest pain, claudication, dyspnea on exertion, irregular heartbeat, leg swelling, near-syncope, orthopnea, palpitations and paroxysmal nocturnal dyspnea.   Respiratory: Negative for cough, hemoptysis, shortness of breath,  "sputum production and wheezing.    Hematologic/Lymphatic: Does not bruise/bleed easily.   Skin: Negative for suspicious lesions.        Sees Derm yearly   Musculoskeletal: Negative for arthritis, back pain, joint pain, joint swelling and myalgias.   Gastrointestinal: Negative for abdominal pain, anorexia, change in bowel habit, diarrhea, hematemesis, hematochezia and melena.        Colonoscopy pre-tx but not sure of year, we will check our records--was done outside [facility]   Genitourinary: Negative for dysuria, flank pain, frequency and hematuria.        Mammogram no change, dense tissue; sees GYN yearly for exam and I asked that she obtain pap smear with those visits   Neurological: Negative for brief paralysis, dizziness, focal weakness, headaches, loss of balance, numbness, paresthesias and seizures.   Psychiatric/Behavioral: Negative for altered mental status and substance abuse.     Objective:     Physical Exam   Constitutional: She is oriented to person, place, and time. Vital signs are normal. She appears well-developed and well-nourished. No distress.   BP (!) 141/71 (BP Location: Left arm, Patient Position: Sitting, BP Method: Medium (Automatic))  Later 140/80 sitting manually by me Pulse 92   Ht 5' 3" (1.6 m)   Wt 58.1 kg (128 lb 1.4 oz)   LMP 03/01/2007   BMI 22.69 kg/m²    HENT:   Head: Normocephalic and atraumatic.   Nose: Nose normal.   Mouth/Throat: Oropharynx is clear and moist. No oropharyngeal exudate.   Eyes: Conjunctivae are normal. Pupils are equal, round, and reactive to light. Right eye exhibits no discharge. Left eye exhibits no discharge. No scleral icterus.   Neck: Neck supple. No JVD present. No thyromegaly present.   Cardiovascular: Normal rate, regular rhythm, normal heart sounds and intact distal pulses. PMI is not displaced. Exam reveals no gallop and no friction rub.   No murmur heard.  Pulmonary/Chest: Effort normal and breath sounds normal. No respiratory distress. She has no " wheezes. She has no rales.   Abdominal: Soft. Bowel sounds are normal. She exhibits no distension and no mass. There is no tenderness. There is no rebound and no guarding.   Musculoskeletal: She exhibits no edema or tenderness.   Neurological: She is alert and oriented to person, place, and time.   Skin: Skin is warm and dry. She is not diaphoretic.   Psychiatric: She has a normal mood and affect. Her behavior is normal. Judgment and thought content normal.     Lab Results   Component Value Date    BNP 36 03/19/2019     03/19/2019    K 3.8 03/19/2019    MG 1.5 (L) 03/19/2019     03/19/2019    CO2 22 (L) 03/19/2019    BUN 15 03/19/2019    CREATININE 0.8 03/19/2019    GLU 84 03/19/2019    AST 29 03/19/2019    ALT 19 03/19/2019    ALBUMIN 4.0 03/19/2019    PROT 7.4 03/19/2019    BILITOT 0.5 03/19/2019    WBC 4.23 03/19/2019    HGB 11.3 (L) 03/19/2019    HCT 35.8 (L) 03/19/2019     03/19/2019    TSH 1.103 03/19/2019    CHOL 154 03/19/2019    HDL 72 03/19/2019    LDLCALC 70.0 03/19/2019    TRIG 60 03/19/2019    TACROLIMUS 3.4 (L)--target level 3-6 03/19/2019    EVEROLIMUS Pending--target level 3-8     ALLOMAP See result image under hyperlink 12/28/2018     Lab Results   Component Value Date    IRON 59 03/19/2019    TIBC 336--17.6% 03/19/2019    FERRITIN 62 09/12/2016     3/19/2019 CXR normal post op    3/19/2019 ECHO Conclusion   · Cardiac transplantation.  · Normal left ventricular systolic function. The estimated ejection fraction is 60%  · Concentric left ventricular remodeling.  · Local segmental wall motion abnormalities.  · Normal LV diastolic function.  · Normal right ventricular systolic function.  · Normal central venous pressure (3 mm Hg).  · The estimated PA systolic pressure is 24 mm Hg        3/13/2019 Mammogram with dense breast tissue but no change per report.  She is to see Gyn for breast exam    Assessment:     1. Heart transplanted    2. Cardiac allograft vasculopathy    3. Long-term  use of immunosuppressant medication    4. Essential hypertension    5. Hyperlipidemia LDL goal <70    6. Hypothyroidism (acquired)      Plan:   Needs angio as year 3, cancel stress test  Check on last colonoscopy date  Obtain yearly gyn exam include breast and pap smear    Return instructions as set forth by post transplant schedule or as needed:     Clinic: Return for labs and/or biopsy weekly the first month, every two weeks during month 2 and then monthly for the first year at the provider or coordinator's discretion. During the second year, return to clinic every 3 months. Post transplant year 3-5 return every 6 months. There will be a comprehensive post transplant evaluation every year that may include LHC/RHC/biopsy, stress test, echo, CXR, and other health screening exams.     In addition to the clinical assessment, I have ordered Allomap testing for this patient to assist in identification of moderate/severe acute cellular rejection (ACR) in a pt with stable Allograft function instead of endomyocardial biopsy.      Patient is reminded to call with any health changes as these can be early signs of transplant complications. Patient is advised to make sure any new medications or changes of old medications are discussed with a pharmacist or physician knowledgeable with transplant to avoid rejection/drug toxicity related to significant drug interactions.     UNOS Patient Status  Functional Status: 100% - Normal, no complaints, no evidence of disease  Physical Capacity: No Limitations  Working for Income: Yes, full time for [employer]

## 2019-03-19 NOTE — LETTER
March 19, 2019        Esha Tee  [address] [facility] [address]  [place] [address]  Phone: [phone]  Fax: [phone]             [facility] [address] [place] [address]  Phone: [phone]   Patient: Cassandra Gonzales   MR Number: [id number]   YOB: 1955   Date of Visit: 3/19/2019       Dear Dr. Esha Tee    Thank you for referring Cassandra Gonzales to me for evaluation. Attached you will find relevant portions of my assessment and plan of care.    If you have questions, please do not hesitate to call me. I look forward to following Cassandra Gonzales along with you.    Sincerely,    Clifford Ibarra Jr, MD    Enclosure    If you would like to receive this communication electronically, please contact [email or web address] or [phone] to request Deep-Secure Link access.    Deep-Secure Link is a tool which provides read-only access to select patient information with whom you have a relationship. Its easy to use and provides real time access to review your patients record including encounter summaries, notes, results, and demographic information.    If you feel you have received this communication in error or would no longer like to receive these types of communications, please e-mail [email or web address]

## 2019-03-25 ENCOUNTER — PATIENT MESSAGE (OUTPATIENT)
Dept: TRANSPLANT | Facility: CLINIC | Age: 64
End: 2019-03-25

## 2019-03-28 ENCOUNTER — OFFICE VISIT (OUTPATIENT)
Dept: CARDIOLOGY | Facility: CLINIC | Age: 64
End: 2019-03-28
Payer: COMMERCIAL

## 2019-03-28 ENCOUNTER — PATIENT MESSAGE (OUTPATIENT)
Dept: TRANSPLANT | Facility: CLINIC | Age: 64
End: 2019-03-28

## 2019-03-28 ENCOUNTER — DOCUMENTATION ONLY (OUTPATIENT)
Dept: CARDIOLOGY | Facility: CLINIC | Age: 64
End: 2019-03-28

## 2019-03-28 VITALS
HEIGHT: 63 IN | WEIGHT: 129 LBS | OXYGEN SATURATION: 100 % | BODY MASS INDEX: 22.86 KG/M2 | DIASTOLIC BLOOD PRESSURE: 82 MMHG | SYSTOLIC BLOOD PRESSURE: 139 MMHG | HEART RATE: 106 BPM

## 2019-03-28 DIAGNOSIS — T86.290 CARDIAC ALLOGRAFT VASCULOPATHY: ICD-10-CM

## 2019-03-28 DIAGNOSIS — I10 ESSENTIAL HYPERTENSION: ICD-10-CM

## 2019-03-28 DIAGNOSIS — Z94.1 STATUS POST HEART TRANSPLANT: Primary | ICD-10-CM

## 2019-03-28 DIAGNOSIS — E78.5 HYPERLIPIDEMIA LDL GOAL <70: ICD-10-CM

## 2019-03-28 DIAGNOSIS — D84.9 IMMUNOSUPPRESSION: ICD-10-CM

## 2019-03-28 DIAGNOSIS — Z94.1 TRANSPLANTED HEART: Primary | ICD-10-CM

## 2019-03-28 LAB
INFLUENZA A ANTIGEN, POC: NEGATIVE
INFLUENZA B ANTIGEN, POC: NEGATIVE
RAPID GROUP A STREP (OHS): NEGATIVE

## 2019-03-28 PROCEDURE — 3079F DIAST BP 80-89 MM HG: CPT | Mod: CPTII,S$GLB,, | Performed by: INTERNAL MEDICINE

## 2019-03-28 PROCEDURE — 3079F PR MOST RECENT DIASTOLIC BLOOD PRESSURE 80-89 MM HG: ICD-10-PCS | Mod: CPTII,S$GLB,, | Performed by: INTERNAL MEDICINE

## 2019-03-28 PROCEDURE — 99999 PR PBB SHADOW E&M-EST. PATIENT-LVL IV: ICD-10-PCS | Mod: PBBFAC,,, | Performed by: INTERNAL MEDICINE

## 2019-03-28 PROCEDURE — 99213 PR OFFICE/OUTPT VISIT, EST, LEVL III, 20-29 MIN: ICD-10-PCS | Mod: S$GLB,,, | Performed by: INTERNAL MEDICINE

## 2019-03-28 PROCEDURE — 3008F BODY MASS INDEX DOCD: CPT | Mod: CPTII,S$GLB,, | Performed by: INTERNAL MEDICINE

## 2019-03-28 PROCEDURE — 3075F PR MOST RECENT SYSTOLIC BLOOD PRESS GE 130-139MM HG: ICD-10-PCS | Mod: CPTII,S$GLB,, | Performed by: INTERNAL MEDICINE

## 2019-03-28 PROCEDURE — 3075F SYST BP GE 130 - 139MM HG: CPT | Mod: CPTII,S$GLB,, | Performed by: INTERNAL MEDICINE

## 2019-03-28 PROCEDURE — 3008F PR BODY MASS INDEX (BMI) DOCUMENTED: ICD-10-PCS | Mod: CPTII,S$GLB,, | Performed by: INTERNAL MEDICINE

## 2019-03-28 PROCEDURE — 99213 OFFICE O/P EST LOW 20 MIN: CPT | Mod: S$GLB,,, | Performed by: INTERNAL MEDICINE

## 2019-03-28 PROCEDURE — 99999 PR PBB SHADOW E&M-EST. PATIENT-LVL IV: CPT | Mod: PBBFAC,,, | Performed by: INTERNAL MEDICINE

## 2019-03-28 RX ORDER — SODIUM CHLORIDE 9 MG/ML
3 INJECTION, SOLUTION INTRAVENOUS CONTINUOUS
Status: CANCELLED | OUTPATIENT
Start: 2019-03-28 | End: 2019-03-28

## 2019-03-28 RX ORDER — ACETAMINOPHEN 325 MG/1
325 TABLET ORAL EVERY 6 HOURS PRN
COMMUNITY

## 2019-03-28 NOTE — PROGRESS NOTES
Subjective:    Patient ID:  Cassandra Gonzales is a 64 y.o. female who presents for follow-up of Heart Transplant      HPI  Mrs. 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  She denies angina or exertional dyspnea.  She denies LE edema orthopnea, or PND.  She denies LE edema, orthopnea, or PND.  She denies palpitations, syncope, or near syncope. SHe has jozef exercising 3 times a week using a combination of a treadmill and exercise bicycle for 30 minutes without symptoms.  Today the patient reports that she woke up with a sore throat and lost her voice.  She reports  A temperature of 100.0F and no other symptoms. No history of sick contacts. She reports good medication compliance.     Past Medical History:   Diagnosis Date    Anticoagulant long-term use     Cardiomyopathy, dilated, nonischemic 2015    CHF (congestive heart failure)     CHF (NYHA class III, ACC/AHA stage C) 2015    Encounter for blood transfusion     Hyperlipidemia LDL goal <70     Hypertension     Hypothyroidism (acquired)     Peripartum cardiomyopathy 2015    Stroke     TIA in 2014     Past Surgical History:   Procedure Laterality Date    BIOPSY, WITH US GUIDANCE N/A 1/15/2019    Performed by [facility]ren [facility]son [facility] at [facility] CATH LAB    BIOPSY, WITH US GUIDANCE N/A 10/3/2018    Performed by Clifford Ibarra Jr., MD at [facility] CATH LAB    CARDIAC DEFIBRILLATOR PLACEMENT      CARDIAC DEFIBRILLATOR REMOVAL  2016    CARDIAC VALVE REPLACEMENT       SECTION      HEART CATH-LEFT Left 5/10/2016    Performed by Baldomero Jacobsen MD at [facility] CATH LAB    heart transplanted  2016    right breast tumor      TRANSPLANT-HEART N/A 3/24/2016    Performed by Yuriy Edge MD at [facility] OR [address] [place] [address]     Current Outpatient Medications on File Prior to Visit   Medication Sig Dispense Refill    alendronate (FOSAMAX) 70 MG tablet Take 1 tablet (70 mg total) by mouth every 7 days. 51 tablet 0    amoxicillin  (AMOXIL) 500 MG capsule Take 500 mg by mouth as needed. As needed for dental appointments      aspirin (ECOTRIN) 81 MG EC tablet Take 1 tablet (81 mg total) by mouth once daily. 30 tablet 11    atorvastatin (LIPITOR) 40 MG tablet Take 1 tablet (40 mg total) by mouth once daily. 30 tablet 11    calcium-vitamin D tablet 600 mg-200 units Take 1 tablet by mouth once. for 1 dose  0    dexamethasone 0.5 mg/5 mL Soln 15 DROPS (1 ml) OF DEXAMETHASONE MIXED WITH 6ML OF WATER. KEEP MEDICATION IN MOUTH FOR 2-3 MINUTES, THEN EXPECTORATE. REPEAT PROCEDURE 3-4 TIMES A DAY. 120 mL 3    everolimus, immunosuppressive, (ZORTRESS) 0.5 mg tablet Take 2 tablets (1 mg total) by mouth 2 (two) times daily. 360 tablet 3    FLUZONE QUAD 7402-0462, PF, 60 mcg (15 mcg x 4)/0.5 mL Syrg ADMINISTER AS PER DOCTORS ORDERS  0    levothyroxine (SYNTHROID) 75 MCG tablet Take 1 tablet (75 mcg total) by mouth before breakfast. 20 tablet 11    lisinopril (PRINIVIL,ZESTRIL) 5 MG tablet Take 1 tablet (5 mg total) by mouth once daily. 90 tablet 3    multivitamin (THERAGRAN) tablet Take 1 tablet by mouth once daily.      mycophenolate (CELLCEPT) 250 mg Cap Take 4 capsules (1,000 mg total) by mouth 2 (two) times daily. 240 capsule 11    tacrolimus (PROGRAF) 1 MG Cap Take 1 capsule (1 mg total) by mouth every 12 (twelve) hours. 240 capsule 4    [DISCONTINUED] dexamethasone 0.5 mg/5 mL Soln Take by mouth daily as needed.       No current facility-administered medications on file prior to visit.      Review of patient's allergies indicates:   Allergen Reactions    Sirolimus      Rash bilat arms   Rash bilat arms      Social History     Tobacco Use    Smoking status: Never Smoker    Smokeless tobacco: Never Used   Substance Use Topics    Alcohol use: No     Alcohol/week: 0.0 oz    Drug use: No     Family History   Problem Relation Age of Onset    Heart disease Mother     Hypertension Mother     Cancer Father     No Known Problems Sister      No Known Problems Brother     No Known Problems Maternal Aunt     No Known Problems Maternal Uncle     No Known Problems Paternal Aunt     No Known Problems Paternal Uncle     No Known Problems Maternal Grandmother     No Known Problems Maternal Grandfather     No Known Problems Paternal Grandmother     No Known Problems Paternal Grandfather     Anemia Neg Hx     Arrhythmia Neg Hx     Asthma Neg Hx     Clotting disorder Neg Hx     Fainting Neg Hx     Heart attack Neg Hx     Heart failure Neg Hx     Hyperlipidemia Neg Hx     Stroke Neg Hx     Atrial Septal Defect Neg Hx        Review of Systems   Constitution: Negative for diaphoresis, fever, malaise/fatigue and weight gain.   HENT: Negative for congestion, hearing loss, nosebleeds and sore throat.    Eyes: Negative for visual disturbance.   Cardiovascular: Negative for chest pain, claudication, dyspnea on exertion, irregular heartbeat, leg swelling, near-syncope, orthopnea, palpitations, paroxysmal nocturnal dyspnea and syncope.   Respiratory: Negative for cough, hemoptysis, shortness of breath and wheezing.    Endocrine: Negative for polyuria.   Hematologic/Lymphatic: Negative for bleeding problem. Does not bruise/bleed easily.   Skin: Negative for poor wound healing and rash.   Musculoskeletal: Negative for myalgias.   Gastrointestinal: Negative for abdominal pain, change in bowel habit, constipation, diarrhea, dysphagia, heartburn, hematemesis, melena, nausea and vomiting.   Genitourinary: Negative for bladder incontinence and dysuria.   Neurological: Negative for focal weakness, headaches and weakness.   Psychiatric/Behavioral: Negative for depression.   Allergic/Immunologic: Negative for hives and persistent infections.        Objective:  Vitals:    03/28/19 0816 03/28/19 0819   BP: 134/80 139/82   BP Location: Left arm Right arm   Patient Position: Sitting Sitting   BP Method: Large (Automatic) Large (Automatic)   Pulse: (!) 111 106   SpO2:  "100%    Weight: 58.5 kg (128 lb 15.5 oz)    Height: 5' 3" (1.6 m)          Physical Exam   Constitutional: She appears well-developed and well-nourished.   HENT:   Head: Normocephalic and atraumatic.   Eyes: Pupils are equal, round, and reactive to light. EOM are normal. Right eye exhibits no discharge. No scleral icterus.   Neck: No JVD present. No thyromegaly present.   Cardiovascular: Normal rate and regular rhythm. PMI is not displaced. Exam reveals no gallop.   No murmur heard.  Pulses:       Carotid pulses are 2+ on the right side, and 2+ on the left side.       Radial pulses are 2+ on the right side, and 2+ on the left side.        Femoral pulses are 2+ on the right side, and 2+ on the left side.       Dorsalis pedis pulses are 2+ on the right side, and 2+ on the left side.        Posterior tibial pulses are 2+ on the right side, and 2+ on the left side.   Pulmonary/Chest: Effort normal and breath sounds normal.   Abdominal: Soft. Bowel sounds are normal. There is no hepatosplenomegaly. There is no tenderness.   Musculoskeletal: She exhibits edema.   Lymphadenopathy:        Head (right side): No submental, no submandibular, no tonsillar, no preauricular and no posterior auricular adenopathy present.        Head (left side): No submental, no submandibular, no tonsillar, no preauricular and no posterior auricular adenopathy present.     She has no cervical adenopathy.        Right cervical: No superficial cervical, no deep cervical and no posterior cervical adenopathy present.       Left cervical: No superficial cervical, no deep cervical and no posterior cervical adenopathy present.   Neurological: She is alert. Gait normal.   Skin: Skin is warm, dry and intact. She is not diaphoretic.   Psychiatric: She has a normal mood and affect.         Assessment:       1. Immunosuppression    2. Essential hypertension    3. Status post heart transplant    4. Cardiac allograft vasculopathy    5. Hyperlipidemia LDL goal " <70         Plan:       1) S/P OHT - plan for [facility] with IVUS; R CFA; 6F sheath; JL4 and JR4   - The risks, benefits, and alternatives of coronary vascular angiography and possible intervention were discussed with pt. All questions were answered and informed consent was obtained. I had a detailed discussion with the patient regarding risk of stroke, MI, bleeding access site complications, renal failure, emergent need for heart surgery, acute limb complications including ischemia and loss, contrast allergy and death. Pt understands the risks and benefits of the procedure and wishes to proceed.      2) HTN - BP is controlled; contnue current  meds     3) HLD - 3/19/19 lipid panel reviewed from today; continue lipitor     4) H/O hypothyroid. 3/19/19 TSH was wnl; continue synthroid;    5) Sore throat. Discussed with Dr. Negron, [facility]; likely viral URTI; patient advised to hydrate and inform her transplant coordinator; she was advised to see her PCP immediately if symptoms worsen.  She was also advised to call her transplant coordinator if symptoms do not resolve promptly or get worse     All of the patient's questions were answered.

## 2019-03-28 NOTE — H&P (VIEW-ONLY)
Subjective:    Patient ID:  Cassandra Gonzales is a 64 y.o. female who presents for follow-up of Heart Transplant      HPI  Mrs. Gonzales is a 63 y/o woman s/ OHT in [facility] 2016.  She denies angina or exertional dyspnea.  She denies LE edema orthopnea, or PND.  She denies LE edema, orthopnea, or PND.  She denies palpitations, syncope, or near syncope. SHe has jozef exercising 3 times a week using a combination of a treadmill and exercise bicycle for 30 minutes without symptoms.  Today the patient reports that she woke up with a sore throat and lost her voice.  She reports  A temperature of 100.0F and no other symptoms. No history of sick contacts. She reports good medication compliance.     Past Medical History:   Diagnosis Date    Anticoagulant long-term use     Cardiomyopathy, dilated, nonischemic 2015    CHF (congestive heart failure)     CHF (NYHA class III, ACC/AHA stage C) 2015    Encounter for blood transfusion     Hyperlipidemia LDL goal <70     Hypertension     Hypothyroidism (acquired)     Peripartum cardiomyopathy 2015    Stroke     TIA in 2014     Past Surgical History:   Procedure Laterality Date    BIOPSY, WITH US GUIDANCE N/A 1/15/2019    [facility] at [facility] CATH LAB    BIOPSY, WITH US GUIDANCE N/A 10/3/2018    Performed by Clifford Ibarra Jr., MD at [facility] CATH LAB    CARDIAC DEFIBRILLATOR PLACEMENT      CARDIAC DEFIBRILLATOR REMOVAL  2016    CARDIAC VALVE REPLACEMENT       SECTION      HEART CATH-LEFT Left 5/10/2016    Performed by Baldomero Jacobsen MD at [facility] CATH LAB    heart transplanted  2016    right breast tumor      TRANSPLANT-HEART N/A 3/24/2016    Performed by Yuriy Edge MD at [facility] OR [address] [place] [address]     Current Outpatient Medications on File Prior to Visit   Medication Sig Dispense Refill    alendronate (FOSAMAX) 70 MG tablet Take 1 tablet (70 mg total) by mouth every 7 days. 51 tablet 0    amoxicillin  (AMOXIL) 500 MG capsule Take 500 mg by mouth as needed. As needed for dental appointments      aspirin (ECOTRIN) 81 MG EC tablet Take 1 tablet (81 mg total) by mouth once daily. 30 tablet 11    atorvastatin (LIPITOR) 40 MG tablet Take 1 tablet (40 mg total) by mouth once daily. 30 tablet 11    calcium-vitamin D tablet 600 mg-200 units Take 1 tablet by mouth once. for 1 dose  0    dexamethasone 0.5 mg/5 mL Soln 15 DROPS (1 ml) OF DEXAMETHASONE MIXED WITH 6ML OF WATER. KEEP MEDICATION IN MOUTH FOR 2-3 MINUTES, THEN EXPECTORATE. REPEAT PROCEDURE 3-4 TIMES A DAY. 120 mL 3    everolimus, immunosuppressive, (ZORTRESS) 0.5 mg tablet Take 2 tablets (1 mg total) by mouth 2 (two) times daily. 360 tablet 3    FLUZONE QUAD 9419-0796, PF, 60 mcg (15 mcg x 4)/0.5 mL Syrg ADMINISTER AS PER DOCTORS ORDERS  0    levothyroxine (SYNTHROID) 75 MCG tablet Take 1 tablet (75 mcg total) by mouth before breakfast. 20 tablet 11    lisinopril (PRINIVIL,ZESTRIL) 5 MG tablet Take 1 tablet (5 mg total) by mouth once daily. 90 tablet 3    multivitamin (THERAGRAN) tablet Take 1 tablet by mouth once daily.      mycophenolate (CELLCEPT) 250 mg Cap Take 4 capsules (1,000 mg total) by mouth 2 (two) times daily. 240 capsule 11    tacrolimus (PROGRAF) 1 MG Cap Take 1 capsule (1 mg total) by mouth every 12 (twelve) hours. 240 capsule 4    [DISCONTINUED] dexamethasone 0.5 mg/5 mL Soln Take by mouth daily as needed.       No current facility-administered medications on file prior to visit.      Review of patient's allergies indicates:   Allergen Reactions    Sirolimus      Rash bilat arms   Rash bilat arms      Social History     Tobacco Use    Smoking status: Never Smoker    Smokeless tobacco: Never Used   Substance Use Topics    Alcohol use: No     Alcohol/week: 0.0 oz    Drug use: No     Family History   Problem Relation Age of Onset    Heart disease Mother     Hypertension Mother     Cancer Father     No Known Problems Sister      No Known Problems Brother     No Known Problems Maternal Aunt     No Known Problems Maternal Uncle     No Known Problems Paternal Aunt     No Known Problems Paternal Uncle     No Known Problems Maternal Grandmother     No Known Problems Maternal Grandfather     No Known Problems Paternal Grandmother     No Known Problems Paternal Grandfather     Anemia Neg Hx     Arrhythmia Neg Hx     Asthma Neg Hx     Clotting disorder Neg Hx     Fainting Neg Hx     Heart attack Neg Hx     Heart failure Neg Hx     Hyperlipidemia Neg Hx     Stroke Neg Hx     Atrial Septal Defect Neg Hx        Review of Systems   Constitution: Negative for diaphoresis, fever, malaise/fatigue and weight gain.   HENT: Negative for congestion, hearing loss, nosebleeds and sore throat.    Eyes: Negative for visual disturbance.   Cardiovascular: Negative for chest pain, claudication, dyspnea on exertion, irregular heartbeat, leg swelling, near-syncope, orthopnea, palpitations, paroxysmal nocturnal dyspnea and syncope.   Respiratory: Negative for cough, hemoptysis, shortness of breath and wheezing.    Endocrine: Negative for polyuria.   Hematologic/Lymphatic: Negative for bleeding problem. Does not bruise/bleed easily.   Skin: Negative for poor wound healing and rash.   Musculoskeletal: Negative for myalgias.   Gastrointestinal: Negative for abdominal pain, change in bowel habit, constipation, diarrhea, dysphagia, heartburn, hematemesis, melena, nausea and vomiting.   Genitourinary: Negative for bladder incontinence and dysuria.   Neurological: Negative for focal weakness, headaches and weakness.   Psychiatric/Behavioral: Negative for depression.   Allergic/Immunologic: Negative for hives and persistent infections.        Objective:  Vitals:    03/28/19 0816 03/28/19 0819   BP: 134/80 139/82   BP Location: Left arm Right arm   Patient Position: Sitting Sitting   BP Method: Large (Automatic) Large (Automatic)   Pulse: (!) 111 106   SpO2:  "100%    Weight: 58.5 kg (128 lb 15.5 oz)    Height: 5' 3" (1.6 m)          Physical Exam   Constitutional: She appears well-developed and well-nourished.   HENT:   Head: Normocephalic and atraumatic.   Eyes: Pupils are equal, round, and reactive to light. EOM are normal. Right eye exhibits no discharge. No scleral icterus.   Neck: No JVD present. No thyromegaly present.   Cardiovascular: Normal rate and regular rhythm. PMI is not displaced. Exam reveals no gallop.   No murmur heard.  Pulses:       Carotid pulses are 2+ on the right side, and 2+ on the left side.       Radial pulses are 2+ on the right side, and 2+ on the left side.        Femoral pulses are 2+ on the right side, and 2+ on the left side.       Dorsalis pedis pulses are 2+ on the right side, and 2+ on the left side.        Posterior tibial pulses are 2+ on the right side, and 2+ on the left side.   Pulmonary/Chest: Effort normal and breath sounds normal.   Abdominal: Soft. Bowel sounds are normal. There is no hepatosplenomegaly. There is no tenderness.   Musculoskeletal: She exhibits edema.   Lymphadenopathy:        Head (right side): No submental, no submandibular, no tonsillar, no preauricular and no posterior auricular adenopathy present.        Head (left side): No submental, no submandibular, no tonsillar, no preauricular and no posterior auricular adenopathy present.     She has no cervical adenopathy.        Right cervical: No superficial cervical, no deep cervical and no posterior cervical adenopathy present.       Left cervical: No superficial cervical, no deep cervical and no posterior cervical adenopathy present.   Neurological: She is alert. Gait normal.   Skin: Skin is warm, dry and intact. She is not diaphoretic.   Psychiatric: She has a normal mood and affect.         Assessment:       1. Immunosuppression    2. Essential hypertension    3. Status post heart transplant    4. Cardiac allograft vasculopathy    5. Hyperlipidemia LDL goal " <70         Plan:       1) S/P OHT - plan for [facility] with IVUS; R CFA; 6F sheath; JL4 and JR4   - The risks, benefits, and alternatives of coronary vascular angiography and possible intervention were discussed with pt. All questions were answered and informed consent was obtained. I had a detailed discussion with the patient regarding risk of stroke, MI, bleeding access site complications, renal failure, emergent need for heart surgery, acute limb complications including ischemia and loss, contrast allergy and death. Pt understands the risks and benefits of the procedure and wishes to proceed.      2) HTN - BP is controlled; contnue current  meds     3) HLD - 3/19/19 lipid panel reviewed from today; continue lipitor     4) H/O hypothyroid. 3/19/19 TSH was wnl; continue synthroid;    5) Sore throat. Discussed with Dr. Negron, [facility]; likely viral URTI; patient advised to hydrate and inform her transplant coordinator; she was advised to see her PCP immediately if symptoms worsen.  She was also advised to call her transplant coordinator if symptoms do not resolve promptly or get worse     All of the patient's questions were answered.

## 2019-03-28 NOTE — PROGRESS NOTES
OUTPATIENT CATHETERIZATION INSTRUCTIONS    You have been scheduled for a procedure in the catheterization lab on Friday, April 12, 2019.     Please report to the Cardiology Waiting Area on the Third floor of the hospital and check in at 6:30 AM.   You will then be taken to the SSCU (Short Stay Cardiac Unit) and prepared for your procedure. Please be aware that this is not the time of your procedure but the time you are to arrive. The procedures are scheduled on an hourly basis; however, emergency cases take precedence over all other cases.       You may not have anything to eat or drink after midnight the night before your test. You may take your regular morning medications with water. If there are any medications that you should not take you will be instructed to hold them that morning. If you are diabetic and on Metformin (Glucophage) do not take it the day before, the day of, and for 2 days after your procedure.      The procedure will take 1-2 hours to perform. After the procedure, you will return to SSCU on the third floor of the hospital. You will need to lie still (or keep your arm still) for the next 4 to 6 hours to minimize bleeding from the puncture site. Your family may remain in the room with you during this time.       You may be able to be discharged home that same afternoon if there is someone to drive you home and there were no complications. If you have one of the balloon, stent, or device procedures you may spend the night in the hospital. Your doctor will determine, based on your progress, the date and time of your discharge. The results of your procedure will be discussed with you before you are discharged. Any further testing or procedures will be scheduled for you either before you leave or you will be called with these appointments.       If you should have any questions, concerns, or need to change the date of your procedure, please call  SUSSY Gonzalez @ [phone]    Special  Instructions:  Drink plenty of water the day before and after your procedure.     THE ABOVE INSTRUCTIONS WERE GIVEN TO THE PATIENT VERBALLY AND THEY VERBALIZED UNDERSTANDING.  THEY DO NOT REQUIRE ANY SPECIAL NEEDS AND DO NOT HAVE ANY LEARNING BARRIERS.          Directions for Reporting to Cardiology Waiting Area in the Hospital  If you park in the Parking Garage:  Take elevators to the1st floor of the parking garage.  Continue past the gift shop, coffee shop, and piano.  Take a right and go to the gold elevators. (Elevator B)  Take the elevator to the 3rd floor.  Follow the arrow on the sign on the wall that says Cath Lab Registration/EP Lab Registration.  Follow the long hallway all the way around until you come to a big open area.  This is the registration area.  Check in at Reception Desk.    OR    If family is dropping you off:  Have them drop you off at the front of the Hospital under the green overhang.  Enter through the doors and take a right.  Take the E elevators to the 3rd floor Cardiology Waiting Area.  Check in at the Reception Desk in the waiting room.

## 2019-03-29 ENCOUNTER — TELEPHONE (OUTPATIENT)
Dept: TRANSPLANT | Facility: CLINIC | Age: 64
End: 2019-03-29

## 2019-03-29 ENCOUNTER — PATIENT MESSAGE (OUTPATIENT)
Dept: TRANSPLANT | Facility: CLINIC | Age: 64
End: 2019-03-29

## 2019-03-29 LAB
EXT ALBUMIN: 3.9
EXT ALT: 21
EXT AST: 28
EXT BUN: 11
EXT CALCIUM: 9.3
EXT CHLORIDE: 108
EXT CREATININE: 0.83 MG/DL
EXT GLUCOSE: 79
EXT HEMATOCRIT: 36.2
EXT HEMOGLOBIN: 11.7
EXT MAGNESIUM: 1.8
EXT PLATELETS: 172
EXT POTASSIUM: 4.6
EXT PROTEIN TOTAL: 7.1
EXT SODIUM: 144 MMOL/L
EXT TACROLIMUS LVL: 3.4
EXT WBC: 9.1

## 2019-03-29 NOTE — TELEPHONE ENCOUNTER
-  Spoke with Lab and reassured them that the order went to [phone].    ---- Message from Heidi Castro sent at 3/29/2019  8:22 AM CDT -----  Contact: Jada [phone] with [facility]  Pt is currently at the lab and they need the orders.    Thanks

## 2019-04-01 ENCOUNTER — PATIENT MESSAGE (OUTPATIENT)
Dept: TRANSPLANT | Facility: CLINIC | Age: 64
End: 2019-04-01

## 2019-04-02 ENCOUNTER — PATIENT MESSAGE (OUTPATIENT)
Dept: TRANSPLANT | Facility: CLINIC | Age: 64
End: 2019-04-02

## 2019-04-04 ENCOUNTER — PATIENT MESSAGE (OUTPATIENT)
Dept: TRANSPLANT | Facility: CLINIC | Age: 64
End: 2019-04-04

## 2019-04-10 ENCOUNTER — TELEPHONE (OUTPATIENT)
Dept: TRANSPLANT | Facility: CLINIC | Age: 64
End: 2019-04-10

## 2019-04-10 NOTE — TELEPHONE ENCOUNTER
Pt called to inform me she is still nasally and congested. No fever. She stopped taking mucinex ad is taking zyrtec. I told her to continue taking mucinex to liquify her congestion. She was concerned about her LHC on Friday. I informed her she can still have it done. Continue drinking water and stay hydrated.

## 2019-04-12 ENCOUNTER — HOSPITAL ENCOUNTER (OUTPATIENT)
Facility: HOSPITAL | Age: 64
Discharge: HOME | End: 2019-04-12
Attending: INTERNAL MEDICINE | Admitting: INTERNAL MEDICINE
Payer: COMMERCIAL

## 2019-04-12 VITALS
HEART RATE: 87 BPM | HEIGHT: 63 IN | DIASTOLIC BLOOD PRESSURE: 64 MMHG | WEIGHT: 125 LBS | TEMPERATURE: 98 F | OXYGEN SATURATION: 100 % | SYSTOLIC BLOOD PRESSURE: 128 MMHG | RESPIRATION RATE: 18 BRPM | BODY MASS INDEX: 22.15 KG/M2

## 2019-04-12 DIAGNOSIS — Z94.1 STATUS POST HEART TRANSPLANT: ICD-10-CM

## 2019-04-12 DIAGNOSIS — D84.9 IMMUNOSUPPRESSION: ICD-10-CM

## 2019-04-12 DIAGNOSIS — T86.290 CARDIAC ALLOGRAFT VASCULOPATHY: ICD-10-CM

## 2019-04-12 LAB
ABO + RH BLD: NORMAL
ANION GAP SERPL CALC-SCNC: 9 MMOL/L (ref 8–16)
BASOPHILS # BLD AUTO: 0.03 K/UL (ref 0–0.2)
BASOPHILS NFR BLD: 0.4 % (ref 0–1.9)
BLD GP AB SCN CELLS X3 SERPL QL: NORMAL
BUN SERPL-MCNC: 10 MG/DL (ref 8–23)
CALCIUM SERPL-MCNC: 9.5 MG/DL (ref 8.7–10.5)
CHLORIDE SERPL-SCNC: 106 MMOL/L (ref 95–110)
CO2 SERPL-SCNC: 28 MMOL/L (ref 23–29)
CREAT SERPL-MCNC: 0.9 MG/DL (ref 0.5–1.4)
DIFFERENTIAL METHOD: ABNORMAL
EOSINOPHIL # BLD AUTO: 0.1 K/UL (ref 0–0.5)
EOSINOPHIL NFR BLD: 1.4 % (ref 0–8)
ERYTHROCYTE [DISTWIDTH] IN BLOOD BY AUTOMATED COUNT: 13.9 % (ref 11.5–14.5)
EST. GFR  (AFRICAN AMERICAN): >60 ML/MIN/1.73 M^2
EST. GFR  (NON AFRICAN AMERICAN): >60 ML/MIN/1.73 M^2
GLUCOSE SERPL-MCNC: 79 MG/DL (ref 70–110)
HCT VFR BLD AUTO: 33 % (ref 37–48.5)
HGB BLD-MCNC: 10.3 G/DL (ref 12–16)
IMM GRANULOCYTES # BLD AUTO: 0.02 K/UL (ref 0–0.04)
IMM GRANULOCYTES NFR BLD AUTO: 0.3 % (ref 0–0.5)
LYMPHOCYTES # BLD AUTO: 1.6 K/UL (ref 1–4.8)
LYMPHOCYTES NFR BLD: 22.9 % (ref 18–48)
MCH RBC QN AUTO: 26.5 PG (ref 27–31)
MCHC RBC AUTO-ENTMCNC: 31.2 G/DL (ref 32–36)
MCV RBC AUTO: 85 FL (ref 82–98)
MONOCYTES # BLD AUTO: 0.7 K/UL (ref 0.3–1)
MONOCYTES NFR BLD: 9.9 % (ref 4–15)
NEUTROPHILS # BLD AUTO: 4.6 K/UL (ref 1.8–7.7)
NEUTROPHILS NFR BLD: 65.1 % (ref 38–73)
NRBC BLD-RTO: 0 /100 WBC
PLATELET # BLD AUTO: 376 K/UL (ref 150–350)
PMV BLD AUTO: 9.2 FL (ref 9.2–12.9)
POTASSIUM SERPL-SCNC: 4.1 MMOL/L (ref 3.5–5.1)
RBC # BLD AUTO: 3.88 M/UL (ref 4–5.4)
SODIUM SERPL-SCNC: 143 MMOL/L (ref 136–145)
WBC # BLD AUTO: 7.04 K/UL (ref 3.9–12.7)

## 2019-04-12 PROCEDURE — 25000003 PHARM REV CODE 250: Performed by: INTERNAL MEDICINE

## 2019-04-12 PROCEDURE — 85025 COMPLETE CBC W/AUTO DIFF WBC: CPT

## 2019-04-12 PROCEDURE — 99153 MOD SED SAME PHYS/QHP EA: CPT | Performed by: INTERNAL MEDICINE

## 2019-04-12 PROCEDURE — C1887 CATHETER, GUIDING: HCPCS | Performed by: INTERNAL MEDICINE

## 2019-04-12 PROCEDURE — 92978 PR IVUS, CORONARY, 1ST VESSEL: ICD-10-PCS | Mod: 26,LD,, | Performed by: INTERNAL MEDICINE

## 2019-04-12 PROCEDURE — 93010 ELECTROCARDIOGRAM REPORT: CPT | Mod: ,,, | Performed by: INTERNAL MEDICINE

## 2019-04-12 PROCEDURE — 93010 ELECTROCARDIOGRAM REPORT: CPT | Mod: 76,,, | Performed by: INTERNAL MEDICINE

## 2019-04-12 PROCEDURE — 25500020 PHARM REV CODE 255: Performed by: INTERNAL MEDICINE

## 2019-04-12 PROCEDURE — 36415 COLL VENOUS BLD VENIPUNCTURE: CPT

## 2019-04-12 PROCEDURE — 99152 MOD SED SAME PHYS/QHP 5/>YRS: CPT | Mod: ,,, | Performed by: INTERNAL MEDICINE

## 2019-04-12 PROCEDURE — 80048 BASIC METABOLIC PNL TOTAL CA: CPT

## 2019-04-12 PROCEDURE — 93458 L HRT ARTERY/VENTRICLE ANGIO: CPT | Performed by: INTERNAL MEDICINE

## 2019-04-12 PROCEDURE — 93010 EKG 12-LEAD: ICD-10-PCS | Mod: 76,,, | Performed by: INTERNAL MEDICINE

## 2019-04-12 PROCEDURE — 93458 L HRT ARTERY/VENTRICLE ANGIO: CPT | Mod: 26,,, | Performed by: INTERNAL MEDICINE

## 2019-04-12 PROCEDURE — C1769 GUIDE WIRE: HCPCS | Performed by: INTERNAL MEDICINE

## 2019-04-12 PROCEDURE — 93005 ELECTROCARDIOGRAM TRACING: CPT

## 2019-04-12 PROCEDURE — 99152 MOD SED SAME PHYS/QHP 5/>YRS: CPT | Performed by: INTERNAL MEDICINE

## 2019-04-12 PROCEDURE — 92978 ENDOLUMINL IVUS OCT C 1ST: CPT | Mod: 26,LD,, | Performed by: INTERNAL MEDICINE

## 2019-04-12 PROCEDURE — C1894 INTRO/SHEATH, NON-LASER: HCPCS | Performed by: INTERNAL MEDICINE

## 2019-04-12 PROCEDURE — 85347 COAGULATION TIME ACTIVATED: CPT | Performed by: INTERNAL MEDICINE

## 2019-04-12 PROCEDURE — 63600175 PHARM REV CODE 636 W HCPCS: Performed by: INTERNAL MEDICINE

## 2019-04-12 PROCEDURE — 99152 PR MOD CONSCIOUS SEDATION, SAME PHYS, 5+ YRS, FIRST 15 MIN: ICD-10-PCS | Mod: ,,, | Performed by: INTERNAL MEDICINE

## 2019-04-12 PROCEDURE — 93458 PR CATH PLACE/CORON ANGIO, IMG SUPER/INTERP,W LEFT HEART VENTRICULOGRAPHY: ICD-10-PCS | Mod: 26,,, | Performed by: INTERNAL MEDICINE

## 2019-04-12 PROCEDURE — 86901 BLOOD TYPING SEROLOGIC RH(D): CPT

## 2019-04-12 PROCEDURE — C1753 CATH, INTRAVAS ULTRASOUND: HCPCS | Performed by: INTERNAL MEDICINE

## 2019-04-12 PROCEDURE — 92978 ENDOLUMINL IVUS OCT C 1ST: CPT | Mod: LD | Performed by: INTERNAL MEDICINE

## 2019-04-12 RX ORDER — FENTANYL CITRATE 50 UG/ML
INJECTION, SOLUTION INTRAMUSCULAR; INTRAVENOUS
Status: DISCONTINUED | OUTPATIENT
Start: 2019-04-12 | End: 2019-04-12 | Stop reason: HOSPADM

## 2019-04-12 RX ORDER — ACETAMINOPHEN 325 MG/1
650 TABLET ORAL EVERY 4 HOURS PRN
Status: DISCONTINUED | OUTPATIENT
Start: 2019-04-12 | End: 2019-04-12 | Stop reason: HOSPADM

## 2019-04-12 RX ORDER — HEPARIN SODIUM 1000 [USP'U]/ML
INJECTION, SOLUTION INTRAVENOUS; SUBCUTANEOUS
Status: DISCONTINUED | OUTPATIENT
Start: 2019-04-12 | End: 2019-04-12 | Stop reason: HOSPADM

## 2019-04-12 RX ORDER — NITROGLYCERIN 5 MG/ML
INJECTION, SOLUTION INTRAVENOUS
Status: DISCONTINUED | OUTPATIENT
Start: 2019-04-12 | End: 2019-04-12 | Stop reason: HOSPADM

## 2019-04-12 RX ORDER — SODIUM CHLORIDE 9 MG/ML
3 INJECTION, SOLUTION INTRAVENOUS CONTINUOUS
Status: ACTIVE | OUTPATIENT
Start: 2019-04-12 | End: 2019-04-12

## 2019-04-12 RX ORDER — MIDAZOLAM HYDROCHLORIDE 1 MG/ML
INJECTION, SOLUTION INTRAMUSCULAR; INTRAVENOUS
Status: DISCONTINUED | OUTPATIENT
Start: 2019-04-12 | End: 2019-04-12 | Stop reason: HOSPADM

## 2019-04-12 RX ORDER — DIPHENHYDRAMINE HCL 50 MG
50 CAPSULE ORAL ONCE
Status: COMPLETED | OUTPATIENT
Start: 2019-04-12 | End: 2019-04-12

## 2019-04-12 RX ADMIN — SODIUM CHLORIDE 3 ML/KG/HR: 0.9 INJECTION, SOLUTION INTRAVENOUS at 06:04

## 2019-04-12 RX ADMIN — DIPHENHYDRAMINE HYDROCHLORIDE 50 MG: 50 CAPSULE ORAL at 07:04

## 2019-04-12 NOTE — Clinical Note
Catheter is inserted into the and is removed from the ostium   right coronary artery. Angiography performed of the right coronary arteries in multiple views. Angiography performed via hand injection with .

## 2019-04-12 NOTE — INTERVAL H&P NOTE
The patient has been examined and the H&P has been reviewed:    I concur with the findings and no changes have occurred since H&P was written.   General: alert, awake and oriented x 3  Eyes:PERRL.   Neck:no JVD   Lungs:  clear to auscultation bilaterally   Cardiovascular: Heart: tachycardic, S1, S2 normal, no murmur, click, rub or gallop.   Chest Wall: no tenderness.   Extremities: no cyanosis or edema.   Abdomen/Rectal: Abdomen: soft, non-tender non-distented; bowel sounds normal  Neurologic: Normal strength and tone. No focal numbness or weakness     LEFT RIGHT   RADIAL 2+ 2+   ULNAR     BRACHIAL     FEMORAL 2+ 2+   DP 2+ 2+   TP 1+ 1+   MARCUS'S TEST Normal Normal   BARBEAU TEST           Anesthesia/Surgery risks, benefits and alternative options discussed and understood by patient/family.      Right CFA access for LHC + IVUS, 6 F sheath, JR 4 and JL4    Active Hospital Problems    Diagnosis  POA    Immunosuppression [D89.9]  Yes      Resolved Hospital Problems   No resolved problems to display.

## 2019-04-12 NOTE — PLAN OF CARE
Problem: Pain (Cardiac Catheterization)  Goal: Acceptable Pain Control  Outcome: Ongoing (interventions implemented as appropriate)  Plan of care and safety prec initiated. Will continue to monitor.

## 2019-04-12 NOTE — Clinical Note
dry, intact, no bleeding and no hematoma. Manual pressure held to RCFA, Hemostasis achieved. No complications

## 2019-04-12 NOTE — Clinical Note
Catheter is inserted into the and is removed from the ostium   left main. Angiography performed of the left coronary arteries in multiple views. Angiography performed via hand injection with .

## 2019-04-12 NOTE — PLAN OF CARE
Small amount of swelling noted to R groin. Pressure held x 15 minutes. Notified MD. Will monitor.

## 2019-04-12 NOTE — Clinical Note
Catheter is inserted into the and is removed from the distal   left anterior descending. ULTRASOUND PERFROMED

## 2019-04-12 NOTE — PLAN OF CARE
Received report from SUSSY Asher. Patient s/p [facility], AAOx3. VSS, no c/o pain or discomfort at this time, resp even and unlabored. Gauze/tegaderm dressing to R groin is CDI. No active bleeding. No hematoma noted. Post procedure protocol reviewed with patient and patient's family. Understanding verbalized. Family members at bedside. Nurse call bell within reach. Will continue to monitor per post procedure protocol.

## 2019-04-13 NOTE — DISCHARGE SUMMARY
[facility]  Interventional Cardiology  Discharge Summary      Patient Name: Cassandra Gonzales  MRN: [id number]  Admission Date: 4/12/2019  Hospital Length of Stay: 0 days  Discharge Date and Time: 4/12/2019  3:30 PM  Attending Physician: No att. providers found  Discharging Provider: Chas Byrne MD  Primary Care Physician: Esha Tee MD    HPI:  63 y/o woman s/ OHT in Match 2016.  She denies angina or exertional dyspnea.  She denies LE edema orthopnea, or PND.  She denies LE edema, orthopnea, or PND.  She denies palpitations, syncope, or near syncope. SHe has jozef exercising 3 times a week using a combination of a treadmill and exercise bicycle for 30 minutes without symptoms. She reports good medication compliance.        Procedure(s) (LRB):  Left heart cath (Left)  ANGIOGRAM, CORONARY ARTERY (N/A)  Ultrasound-coronary (N/A)     Indwelling Lines/Drains at time of discharge:  Lines/Drains/Airways          None          Hospital Course:  Patient presented for LHC + IVUS.Procedure perfromed via right CFA access.Patient tolerated the procedure well without any complications. LHC showed normal coronaries, IVUS showed evidence of CAV which was unchanged from coronary angiogram 4/18. Manual pressure used to close access site. Patient monitored post procedure and discharged home in a stable condition.        Significant Diagnostic Studies: Labs:   BMP:   Recent Labs   Lab 04/12/19  0603   GLU 79      K 4.1      CO2 28   BUN 10   CREATININE 0.9   CALCIUM 9.5    and CBC   Recent Labs   Lab 04/12/19  0603   WBC 7.04   HGB 10.3*   HCT 33.0*   *       Pending Diagnostic Studies:     None            Discharged Condition: good    Follow Up:with HTS team as scheduled    Patient Instructions:   No discharge procedures on file.  Medications:  Reconciled Home Medications:      Medication List      CONTINUE taking these medications    acetaminophen 325 MG tablet  Commonly known as:   TYLENOL  Take 325 mg by mouth every 6 (six) hours as needed for Pain.     alendronate 70 MG tablet  Commonly known as:  FOSAMAX  Take 1 tablet (70 mg total) by mouth every 7 days.     amoxicillin 500 MG capsule  Commonly known as:  AMOXIL  Take 500 mg by mouth as needed. As needed for dental appointments     aspirin 81 MG EC tablet  Commonly known as:  ECOTRIN  Take 1 tablet (81 mg total) by mouth once daily.     atorvastatin 40 MG tablet  Commonly known as:  LIPITOR  Take 1 tablet (40 mg total) by mouth once daily.     calcium-vitamin D3 600 mg(1,500mg) -200 unit Tab  Commonly known as:  CALCIUM 600 WITH VITAMIN D3  Take 1 tablet by mouth once. for 1 dose     dexamethasone 0.5 mg/5 mL Soln  15 DROPS (1 ml) OF DEXAMETHASONE MIXED WITH 6ML OF WATER. KEEP MEDICATION IN MOUTH FOR 2-3 MINUTES, THEN EXPECTORATE. REPEAT PROCEDURE 3-4 TIMES A DAY.     FLUZONE QUAD 6695-5889 (PF) 60 mcg (15 mcg x 4)/0.5 mL Syrg  Generic drug:  flu vac [id number] 36mos up(PF)  ADMINISTER AS PER DOCTORS ORDERS     levothyroxine 75 MCG tablet  Commonly known as:  SYNTHROID  Take 1 tablet (75 mcg total) by mouth before breakfast.     lisinopril 5 MG tablet  Commonly known as:  PRINIVIL,ZESTRIL  Take 1 tablet (5 mg total) by mouth once daily.     multivitamin tablet  Commonly known as:  THERAGRAN  Take 1 tablet by mouth once daily.     mycophenolate 250 mg Cap  Commonly known as:  CELLCEPT  Take 4 capsules (1,000 mg total) by mouth 2 (two) times daily.     tacrolimus 1 MG Cap  Commonly known as:  PROGRAF  Take 1 capsule (1 mg total) by mouth every 12 (twelve) hours.     ZORTRESS 0.5 mg tablet  Generic drug:  everolimus (immunosuppressive)  Take 2 tablets (1 mg total) by mouth 2 (two) times daily.            Time spent on the discharge of patient: 20 minutes    Chas Byrne MD  Interventional Cardiology  [facility]

## 2019-04-13 NOTE — HPI
61 y/o woman s/ OHT in Match 2016.  She denies angina or exertional dyspnea.  She denies LE edema orthopnea, or PND.  She denies LE edema, orthopnea, or PND.  She denies palpitations, syncope, or near syncope. SHe has jozef exercising 3 times a week using a combination of a treadmill and exercise bicycle for 30 minutes without symptoms. She reports good medication compliance.

## 2019-04-15 LAB
POC ACTIVATED CLOTTING TIME K: 81 SEC (ref 74–137)
SAMPLE: NORMAL

## 2019-04-18 ENCOUNTER — PATIENT MESSAGE (OUTPATIENT)
Dept: TRANSPLANT | Facility: CLINIC | Age: 64
End: 2019-04-18

## 2019-04-22 ENCOUNTER — PATIENT MESSAGE (OUTPATIENT)
Dept: TRANSPLANT | Facility: CLINIC | Age: 64
End: 2019-04-22

## 2019-04-24 ENCOUNTER — PATIENT MESSAGE (OUTPATIENT)
Dept: TRANSPLANT | Facility: CLINIC | Age: 64
End: 2019-04-24

## 2019-05-08 RX ORDER — EVEROLIMUS 0.5 MG/1
1 TABLET ORAL 2 TIMES DAILY
Qty: 360 TABLET | Refills: 3 | Status: SHIPPED | OUTPATIENT
Start: 2019-05-08 | End: 2020-05-11

## 2019-05-14 DIAGNOSIS — I10 ESSENTIAL HYPERTENSION: ICD-10-CM

## 2019-05-15 ENCOUNTER — PATIENT MESSAGE (OUTPATIENT)
Dept: TRANSPLANT | Facility: CLINIC | Age: 64
End: 2019-05-15

## 2019-05-15 RX ORDER — LISINOPRIL 5 MG/1
TABLET ORAL
Qty: 90 TABLET | Refills: 12 | Status: SHIPPED | OUTPATIENT
Start: 2019-05-15 | End: 2020-07-27

## 2019-06-06 ENCOUNTER — TELEPHONE (OUTPATIENT)
Dept: TRANSPLANT | Facility: CLINIC | Age: 64
End: 2019-06-06

## 2019-06-06 NOTE — TELEPHONE ENCOUNTER
Pt called and stated that on Monday her dog scratched her . She reports that it is red and warm and has been putting antibiotic ointment on scratch. Pt also stated that she will be seeing her PCP tomorrow to assess scratch further. Informed pt to have PCP fax over clinic note and to take a picture of scratch, just in case. Also informed pt to monitor for increased redness, swelling , discharge from wound, pain, fever or if scratch gets worse. Pt verbalized understanding, no other questions asked.

## 2019-06-07 ENCOUNTER — DOCUMENTATION ONLY (OUTPATIENT)
Dept: TRANSPLANT | Facility: CLINIC | Age: 64
End: 2019-06-07

## 2019-06-07 NOTE — PROGRESS NOTES
Received fax from dermatologist office stating that pt has cellulitis on left chest wall , from dog scratch. Pt was given Augmentin to take twice daily and to call or go to ED if symptoms worsens. Will send to be scanned into chart.

## 2019-06-10 ENCOUNTER — PATIENT MESSAGE (OUTPATIENT)
Dept: TRANSPLANT | Facility: CLINIC | Age: 64
End: 2019-06-10

## 2019-06-17 ENCOUNTER — PATIENT MESSAGE (OUTPATIENT)
Dept: TRANSPLANT | Facility: CLINIC | Age: 64
End: 2019-06-17

## 2019-06-25 ENCOUNTER — PATIENT MESSAGE (OUTPATIENT)
Dept: TRANSPLANT | Facility: CLINIC | Age: 64
End: 2019-06-25

## 2019-06-28 ENCOUNTER — PATIENT MESSAGE (OUTPATIENT)
Dept: TRANSPLANT | Facility: CLINIC | Age: 64
End: 2019-06-28

## 2019-07-01 ENCOUNTER — TELEPHONE (OUTPATIENT)
Dept: TRANSPLANT | Facility: CLINIC | Age: 64
End: 2019-07-01

## 2019-07-01 NOTE — TELEPHONE ENCOUNTER
Reviewed labs in care everywhere. Everolimus level was 4 goal 3-8, tacro level was 4.0 goal 3-8 . Messaged pt in [facility], no changes at this time

## 2019-07-09 NOTE — PROGRESS NOTES
Subjective:    Patient ID:  Cassandra Gonzales is a 62 y.o. female who presents for follow-up of Heart Transplant      HPI  Mrs. 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  She denies angina or exertional dyspnea.  She denies LE edema orthopnea, or PND.  She denies LE edema, orthopnea, or PND.  She denies palpitations, syncope, or near syncope. She reports good medication compliance.    Past Medical History:   Diagnosis Date    Anticoagulant long-term use     Cardiomyopathy, dilated, nonischemic 2015    CHF (congestive heart failure)     CHF (NYHA class III, ACC/AHA stage C) 2015    Encounter for blood transfusion     Hypertension     Hypothyroidism (acquired)     Peripartum cardiomyopathy 2015    Stroke     TIA in 2014     Past Surgical History:   Procedure Laterality Date    CARDIAC DEFIBRILLATOR PLACEMENT      CARDIAC DEFIBRILLATOR REMOVAL  2016    CARDIAC VALVE REPLACEMENT       SECTION      heart transplanted  2016    right breast tumor       Current Outpatient Prescriptions on File Prior to Visit   Medication Sig Dispense Refill    amoxicillin (AMOXIL) 500 MG capsule As needed for dental appointments      aspirin (ECOTRIN) 81 MG EC tablet Take 1 tablet (81 mg total) by mouth once daily. 30 tablet 11    calcium-vitamin D tablet 600 mg-200 units Take 1 tablet by mouth once.  0    everolimus (ZORTRESS) 0.5 mg tablet Take 2 tablets (1 mg total) by mouth 2 (two) times daily. 360 tablet 3    levothyroxine (SYNTHROID) 75 MCG tablet Take 1 tablet (75 mcg total) by mouth before breakfast. 20 tablet 11    multivitamin (THERAGRAN) tablet Take 1 tablet by mouth once daily.      mycophenolate (CELLCEPT) 250 mg Cap Take 4 capsules (1,000 mg total) by mouth 2 (two) times daily. 240 capsule 11    nystatin (MYCOSTATIN) 100,000 unit/mL suspension Take 4 mLs by mouth 4 (four) times daily as needed. Take as needed for oral mouth sores four times daily PRN .   ONLY FILL PER PATIENT' S REQUEST      tacrolimus (PROGRAF) 1 MG Cap Take 1 capsule (1 mg total) by mouth every 12 (twelve) hours. 240 capsule 4     No current facility-administered medications on file prior to visit.      Review of patient's allergies indicates:   Allergen Reactions    Rapamune [sirolimus]      Rash bilat arms      Social History   Substance Use Topics    Smoking status: Never Smoker    Smokeless tobacco: Never Used    Alcohol use No     Family History   Problem Relation Age of Onset    Heart disease Mother     Hypertension Mother     Cancer Father     No Known Problems Sister     No Known Problems Brother     No Known Problems Maternal Aunt     No Known Problems Maternal Uncle     No Known Problems Paternal Aunt     No Known Problems Paternal Uncle     No Known Problems Maternal Grandmother     No Known Problems Maternal Grandfather     No Known Problems Paternal Grandmother     No Known Problems Paternal Grandfather     Anemia Neg Hx     Arrhythmia Neg Hx     Asthma Neg Hx     Clotting disorder Neg Hx     Fainting Neg Hx     Heart attack Neg Hx     Heart failure Neg Hx     Hyperlipidemia Neg Hx     Stroke Neg Hx     Atrial Septal Defect Neg Hx        Review of Systems   Constitution: Negative for diaphoresis, fever, weakness, malaise/fatigue and weight gain.   HENT: Negative for congestion, hearing loss, nosebleeds and sore throat.    Eyes: Negative for visual disturbance.   Cardiovascular: Negative for chest pain, claudication, dyspnea on exertion, irregular heartbeat, leg swelling, near-syncope, orthopnea, palpitations, paroxysmal nocturnal dyspnea and syncope.   Respiratory: Negative for cough, hemoptysis, shortness of breath and wheezing.    Endocrine: Negative for polyuria.   Hematologic/Lymphatic: Negative for bleeding problem. Does not bruise/bleed easily.   Skin: Negative for poor wound healing and rash.   Musculoskeletal: Negative for myalgias.  "  Gastrointestinal: Negative for abdominal pain, change in bowel habit, constipation, diarrhea, dysphagia, heartburn, hematemesis, melena, nausea and vomiting.   Genitourinary: Negative for bladder incontinence and dysuria.   Neurological: Negative for focal weakness and headaches.   Psychiatric/Behavioral: Negative for depression.   Allergic/Immunologic: Negative for hives and persistent infections.        Objective:  Vitals:    03/15/18 1056 03/15/18 1058   BP: 133/76 133/77   BP Location: Right arm Left arm   Patient Position: Sitting Sitting   BP Method: Large (Automatic) Large (Automatic)   Pulse: 99    SpO2: 98%    Weight: 56.8 kg (125 lb 3.5 oz)    Height: 5' 3" (1.6 m)          Physical Exam   Constitutional: She appears well-developed and well-nourished.   HENT:   Head: Normocephalic and atraumatic.   Eyes: EOM are normal. Pupils are equal, round, and reactive to light. Right eye exhibits no discharge. No scleral icterus.   Neck: No JVD present. No thyromegaly present.   Cardiovascular: Normal rate and regular rhythm.  PMI is not displaced.  Exam reveals no gallop.    No murmur heard.  Pulses:       Carotid pulses are 2+ on the right side, and 2+ on the left side.       Radial pulses are 2+ on the right side, and 2+ on the left side.        Femoral pulses are 2+ on the right side, and 2+ on the left side.       Dorsalis pedis pulses are 2+ on the right side, and 2+ on the left side.        Posterior tibial pulses are 2+ on the right side, and 2+ on the left side.   Pulmonary/Chest: Effort normal and breath sounds normal.   Abdominal: Soft. Bowel sounds are normal. There is no hepatosplenomegaly. There is no tenderness.   Lymphadenopathy:     She has no cervical adenopathy.   Neurological: She is alert. Gait normal.   Skin: Skin is warm, dry and intact. No rash noted. She is not diaphoretic. No erythema.   Psychiatric: She has a normal mood and affect.         Assessment:       1. Essential hypertension  "   2. Status post heart transplant    3. Dyslipidemia    4. Hypothyroidism (acquired)         Plan:       1) S/P OHT - plan for C with IVUS; R CFA; 6F sheath; JL4 and JR4                      - The risks, benefits, and alternatives of coronary vascular angiography and possible intervention were discussed with pt. All questions were answered and informed consent was obtained. I had a detailed discussion with the patient regarding risk of stroke, MI, bleeding access site complications, renal failure, emergent need for heart surgery, acute limb complications including ischemia and loss, contrast allergy and death. Pt understands the risks and benefits of the procedure and wishes to proceed.      2) HTN - BP is controlled; contnue current  meds     3) HLD - lipid panel reviewed from today; continue lipitor     4) h/o hypothyroid. 11/30/17 TSH was wnl; continue synthroid;    All of the patient's questions were answered.               no

## 2019-07-23 DIAGNOSIS — Z94.1 STATUS POST HEART TRANSPLANT: Primary | ICD-10-CM

## 2019-09-25 ENCOUNTER — HOSPITAL ENCOUNTER (OUTPATIENT)
Dept: CARDIOLOGY | Facility: CLINIC | Age: 64
Discharge: HOME | End: 2019-09-25
Attending: INTERNAL MEDICINE
Payer: COMMERCIAL

## 2019-09-25 ENCOUNTER — OFFICE VISIT (OUTPATIENT)
Dept: TRANSPLANT | Facility: CLINIC | Age: 64
End: 2019-09-25
Attending: INTERNAL MEDICINE
Payer: COMMERCIAL

## 2019-09-25 ENCOUNTER — LAB VISIT (OUTPATIENT)
Dept: LAB | Facility: HOSPITAL | Age: 64
End: 2019-09-25
Attending: INTERNAL MEDICINE
Payer: COMMERCIAL

## 2019-09-25 ENCOUNTER — PATIENT MESSAGE (OUTPATIENT)
Dept: TRANSPLANT | Facility: CLINIC | Age: 64
End: 2019-09-25

## 2019-09-25 VITALS
SYSTOLIC BLOOD PRESSURE: 140 MMHG | BODY MASS INDEX: 22.15 KG/M2 | WEIGHT: 125 LBS | HEART RATE: 84 BPM | SYSTOLIC BLOOD PRESSURE: 136 MMHG | DIASTOLIC BLOOD PRESSURE: 82 MMHG | WEIGHT: 130.75 LBS | DIASTOLIC BLOOD PRESSURE: 87 MMHG | HEIGHT: 63 IN | HEIGHT: 63 IN | HEART RATE: 84 BPM | BODY MASS INDEX: 23.17 KG/M2

## 2019-09-25 DIAGNOSIS — Z79.899 ENCOUNTER FOR LONG-TERM (CURRENT) USE OF MEDICATIONS: ICD-10-CM

## 2019-09-25 DIAGNOSIS — I10 ESSENTIAL HYPERTENSION: ICD-10-CM

## 2019-09-25 DIAGNOSIS — T86.20 COMPLICATION OF HEART TRANSPLANT, UNSPECIFIED COMPLICATION: ICD-10-CM

## 2019-09-25 DIAGNOSIS — Z79.60 LONG-TERM USE OF IMMUNOSUPPRESSANT MEDICATION: ICD-10-CM

## 2019-09-25 DIAGNOSIS — Z94.1 STATUS POST HEART TRANSPLANT: Primary | ICD-10-CM

## 2019-09-25 DIAGNOSIS — Z94.1 HEART REPLACED BY TRANSPLANT: ICD-10-CM

## 2019-09-25 DIAGNOSIS — Z94.1 STATUS POST HEART TRANSPLANT: ICD-10-CM

## 2019-09-25 DIAGNOSIS — E78.5 HYPERLIPIDEMIA LDL GOAL <70: ICD-10-CM

## 2019-09-25 DIAGNOSIS — T86.290 CARDIAC ALLOGRAFT VASCULOPATHY: ICD-10-CM

## 2019-09-25 LAB
ALBUMIN SERPL BCP-MCNC: 4.5 G/DL (ref 3.5–5.2)
ALP SERPL-CCNC: 63 U/L (ref 55–135)
ALT SERPL W/O P-5'-P-CCNC: 17 U/L (ref 10–44)
ANION GAP SERPL CALC-SCNC: 13 MMOL/L (ref 8–16)
ASCENDING AORTA: 2.79 CM
AST SERPL-CCNC: 29 U/L (ref 10–40)
AV INDEX (PROSTH): 1.04
AV MEAN GRADIENT: 3 MMHG
AV PEAK GRADIENT: 4 MMHG
AV VALVE AREA: 3.74 CM2
AV VELOCITY RATIO: 1.07
BASOPHILS # BLD AUTO: 0.02 K/UL (ref 0–0.2)
BASOPHILS NFR BLD: 0.6 % (ref 0–1.9)
BILIRUB SERPL-MCNC: 0.7 MG/DL (ref 0.1–1)
BNP SERPL-MCNC: 44 PG/ML (ref 0–99)
BSA FOR ECHO PROCEDURE: 1.59 M2
BUN SERPL-MCNC: 14 MG/DL (ref 8–23)
CALCIUM SERPL-MCNC: 9.6 MG/DL (ref 8.7–10.5)
CHLORIDE SERPL-SCNC: 106 MMOL/L (ref 95–110)
CHOLEST SERPL-MCNC: 169 MG/DL (ref 120–199)
CHOLEST/HDLC SERPL: 2 {RATIO} (ref 2–5)
CO2 SERPL-SCNC: 23 MMOL/L (ref 23–29)
CREAT SERPL-MCNC: 0.8 MG/DL (ref 0.5–1.4)
CV ECHO LV RWT: 0.39 CM
DIFFERENTIAL METHOD: ABNORMAL
DOP CALC AO PEAK VEL: 0.99 M/S
DOP CALC AO VTI: 21.76 CM
DOP CALC LVOT AREA: 3.6 CM2
DOP CALC LVOT DIAMETER: 2.14 CM
DOP CALC LVOT PEAK VEL: 1.06 M/S
DOP CALC LVOT STROKE VOLUME: 81.28 CM3
DOP CALCLVOT PEAK VEL VTI: 22.61 CM
E WAVE DECELERATION TIME: 146.47 MSEC
E/A RATIO: 1.7
E/E' RATIO: 13.85 M/S
ECHO LV POSTERIOR WALL: 0.79 CM (ref 0.6–1.1)
EOSINOPHIL # BLD AUTO: 0 K/UL (ref 0–0.5)
EOSINOPHIL NFR BLD: 0.8 % (ref 0–8)
ERYTHROCYTE [DISTWIDTH] IN BLOOD BY AUTOMATED COUNT: 13.9 % (ref 11.5–14.5)
EST. GFR  (AFRICAN AMERICAN): >60 ML/MIN/1.73 M^2
EST. GFR  (NON AFRICAN AMERICAN): >60 ML/MIN/1.73 M^2
FRACTIONAL SHORTENING: 46 % (ref 28–44)
GLUCOSE SERPL-MCNC: 78 MG/DL (ref 70–110)
HCT VFR BLD AUTO: 39.5 % (ref 37–48.5)
HDLC SERPL-MCNC: 86 MG/DL (ref 40–75)
HDLC SERPL: 50.9 % (ref 20–50)
HGB BLD-MCNC: 11.8 G/DL (ref 12–16)
IMM GRANULOCYTES # BLD AUTO: 0 K/UL (ref 0–0.04)
IMM GRANULOCYTES NFR BLD AUTO: 0 % (ref 0–0.5)
INTERVENTRICULAR SEPTUM: 0.84 CM (ref 0.6–1.1)
IVRT: 0.09 MSEC
LDLC SERPL CALC-MCNC: 64.6 MG/DL (ref 63–159)
LEFT INTERNAL DIMENSION IN SYSTOLE: 2.16 CM (ref 2.1–4)
LEFT VENTRICLE DIASTOLIC VOLUME INDEX: 44.74 ML/M2
LEFT VENTRICLE DIASTOLIC VOLUME: 70.85 ML
LEFT VENTRICLE MASS INDEX: 61 G/M2
LEFT VENTRICLE SYSTOLIC VOLUME INDEX: 9.8 ML/M2
LEFT VENTRICLE SYSTOLIC VOLUME: 15.47 ML
LEFT VENTRICULAR INTERNAL DIMENSION IN DIASTOLE: 4.02 CM (ref 3.5–6)
LEFT VENTRICULAR MASS: 96.61 G
LV LATERAL E/E' RATIO: 11.25 M/S
LV SEPTAL E/E' RATIO: 18 M/S
LYMPHOCYTES # BLD AUTO: 1.3 K/UL (ref 1–4.8)
LYMPHOCYTES NFR BLD: 34.8 % (ref 18–48)
MAGNESIUM SERPL-MCNC: 1.7 MG/DL (ref 1.6–2.6)
MCH RBC QN AUTO: 26.9 PG (ref 27–31)
MCHC RBC AUTO-ENTMCNC: 29.9 G/DL (ref 32–36)
MCV RBC AUTO: 90 FL (ref 82–98)
MONOCYTES # BLD AUTO: 0.3 K/UL (ref 0.3–1)
MONOCYTES NFR BLD: 9.4 % (ref 4–15)
MV PEAK A VEL: 0.53 M/S
MV PEAK E VEL: 0.9 M/S
NEUTROPHILS # BLD AUTO: 2 K/UL (ref 1.8–7.7)
NEUTROPHILS NFR BLD: 54.4 % (ref 38–73)
NONHDLC SERPL-MCNC: 83 MG/DL
NRBC BLD-RTO: 0 /100 WBC
PISA TR MAX VEL: 2.24 M/S
PLATELET # BLD AUTO: 219 K/UL (ref 150–350)
PMV BLD AUTO: 10.5 FL (ref 9.2–12.9)
POTASSIUM SERPL-SCNC: 4.2 MMOL/L (ref 3.5–5.1)
PROT SERPL-MCNC: 8.2 G/DL (ref 6–8.4)
RA PRESSURE: 3 MMHG
RBC # BLD AUTO: 4.39 M/UL (ref 4–5.4)
RIGHT VENTRICULAR END-DIASTOLIC DIMENSION: 3.68 CM
RV TISSUE DOPPLER FREE WALL SYSTOLIC VELOCITY 1 (APICAL 4 CHAMBER VIEW): 6.52 CM/S
SINUS: 3.32 CM
SODIUM SERPL-SCNC: 142 MMOL/L (ref 136–145)
STJ: 2.69 CM
TACROLIMUS BLD-MCNC: 3.4 NG/ML (ref 5–15)
TDI LATERAL: 0.08 M/S
TDI SEPTAL: 0.05 M/S
TDI: 0.07 M/S
TR MAX PG: 20 MMHG
TRICUSPID ANNULAR PLANE SYSTOLIC EXCURSION: 1.09 CM
TRIGL SERPL-MCNC: 92 MG/DL (ref 30–150)
TV REST PULMONARY ARTERY PRESSURE: 23 MMHG
WBC # BLD AUTO: 3.62 K/UL (ref 3.9–12.7)

## 2019-09-25 PROCEDURE — 99999 PR PBB SHADOW E&M-EST. PATIENT-LVL III: CPT | Mod: PBBFAC,,, | Performed by: INTERNAL MEDICINE

## 2019-09-25 PROCEDURE — 3008F PR BODY MASS INDEX (BMI) DOCUMENTED: ICD-10-PCS | Mod: CPTII,S$GLB,, | Performed by: INTERNAL MEDICINE

## 2019-09-25 PROCEDURE — 99999 PR PBB SHADOW E&M-EST. PATIENT-LVL III: ICD-10-PCS | Mod: PBBFAC,,, | Performed by: INTERNAL MEDICINE

## 2019-09-25 PROCEDURE — 93306 TRANSTHORACIC ECHO (TTE) COMPLETE: ICD-10-PCS | Mod: S$GLB,,, | Performed by: INTERNAL MEDICINE

## 2019-09-25 PROCEDURE — 83880 ASSAY OF NATRIURETIC PEPTIDE: CPT

## 2019-09-25 PROCEDURE — 99215 PR OFFICE/OUTPT VISIT, EST, LEVL V, 40-54 MIN: ICD-10-PCS | Mod: S$GLB,,, | Performed by: INTERNAL MEDICINE

## 2019-09-25 PROCEDURE — 80061 LIPID PANEL: CPT

## 2019-09-25 PROCEDURE — 3079F PR MOST RECENT DIASTOLIC BLOOD PRESSURE 80-89 MM HG: ICD-10-PCS | Mod: CPTII,S$GLB,, | Performed by: INTERNAL MEDICINE

## 2019-09-25 PROCEDURE — 93306 TTE W/DOPPLER COMPLETE: CPT | Mod: S$GLB,,, | Performed by: INTERNAL MEDICINE

## 2019-09-25 PROCEDURE — 3079F DIAST BP 80-89 MM HG: CPT | Mod: CPTII,S$GLB,, | Performed by: INTERNAL MEDICINE

## 2019-09-25 PROCEDURE — 80053 COMPREHEN METABOLIC PANEL: CPT

## 2019-09-25 PROCEDURE — 86832 HLA CLASS I HIGH DEFIN QUAL: CPT | Mod: PO

## 2019-09-25 PROCEDURE — 85025 COMPLETE CBC W/AUTO DIFF WBC: CPT

## 2019-09-25 PROCEDURE — 86833 HLA CLASS II HIGH DEFIN QUAL: CPT | Mod: 59,PO

## 2019-09-25 PROCEDURE — 86833 HLA CLASS II HIGH DEFIN QUAL: CPT | Mod: PO

## 2019-09-25 PROCEDURE — 3075F SYST BP GE 130 - 139MM HG: CPT | Mod: CPTII,S$GLB,, | Performed by: INTERNAL MEDICINE

## 2019-09-25 PROCEDURE — 86977 RBC SERUM PRETX INCUBJ/INHIB: CPT | Mod: 59,PO

## 2019-09-25 PROCEDURE — 3008F BODY MASS INDEX DOCD: CPT | Mod: CPTII,S$GLB,, | Performed by: INTERNAL MEDICINE

## 2019-09-25 PROCEDURE — 80197 ASSAY OF TACROLIMUS: CPT

## 2019-09-25 PROCEDURE — 80169 DRUG ASSAY EVEROLIMUS: CPT

## 2019-09-25 PROCEDURE — 3075F PR MOST RECENT SYSTOLIC BLOOD PRESS GE 130-139MM HG: ICD-10-PCS | Mod: CPTII,S$GLB,, | Performed by: INTERNAL MEDICINE

## 2019-09-25 PROCEDURE — 99215 OFFICE O/P EST HI 40 MIN: CPT | Mod: S$GLB,,, | Performed by: INTERNAL MEDICINE

## 2019-09-25 PROCEDURE — 83735 ASSAY OF MAGNESIUM: CPT

## 2019-09-25 PROCEDURE — 86832 HLA CLASS I HIGH DEFIN QUAL: CPT | Mod: 59,PO

## 2019-09-25 NOTE — ASSESSMENT & PLAN NOTE
Needs angio at year 5 (2021) and  ECHO otherwise    ECHO due March 2020    Continue everolimus based regimen  Target LDL < 70 with statin

## 2019-09-25 NOTE — LETTER
September 25, 2019        Esha Tee  [address]  [place] [address]  Phone: [phone]  Fax: [phone]             [facility] [address] [place] [address]  Phone: [phone]   Patient: Cassandra Gonzales   MR Number: [id number]   YOB: 1955   Date of Visit: 9/25/2019       Dear Dr. Esha Tee    Thank you for referring Cassandra Gonzales to me for evaluation. Attached you will find relevant portions of my assessment and plan of care.    If you have questions, please do not hesitate to call me. I look forward to following Cassandra Gonzales along with you.    Sincerely,    Clifford Ibarra Jr, MD    Enclosure    If you would like to receive this communication electronically, please contact [email or web address] or [phone] to request [email or web address] Link access.    [email or web address] Link is a tool which provides read-only access to select patient information with whom you have a relationship. Its easy to use and provides real time access to review your patients record including encounter summaries, notes, results, and demographic information.    If you feel you have received this communication in error or would no longer like to receive these types of communications, please e-mail [email or web address]

## 2019-09-25 NOTE — ASSESSMENT & PLAN NOTE
Obtain yearly gyn exam include breast and pap smear--get copy of last mammogram  Continue yearly derm checks and follow sun precautions  Continue dental checks q 6 months  Next colonoscopy due 2021  Get flu shot  Get pneumonia vaccine  Get shingles vaccine

## 2019-09-25 NOTE — ASSESSMENT & PLAN NOTE
Immuno regimen: Prograf 1/1, Cellcept 1000 mg BID, Everolimus 2/2 Off Prednisone.  Immuno goal levels: Prograf 3-6, Everolimus 3-8   F/U on blood levels

## 2019-09-25 NOTE — PROGRESS NOTES
Subjective:   Ms. Gonzales is a 64 y.o. year old White female who received a  - brain death heart transplant on 3/25/16.       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    She is feeling great and having no problems.  Home -120's/<80 for most of the time.  Getting involved with [employer] and [employer], walking more    Immuno regimen: Prograf , Cellcept 1000 mg BID, Everolimus 2/ Off Prednisone.     LAST ECHO AND CATH  Cath 19   · LVEDP (Pre): 8  · Normal coronary arteries by angiography  · CAV detect by IVUS as detailed below with no significant change compared to 18 study    ECHO 19 Conclusion      · Normal left ventricular systolic function. The estimated ejection fraction is 60%  · Normal LV diastolic function.  · Mild tricuspid regurgitation.  · Normal central venous pressure (3 mm Hg).  · The estimated PA systolic pressure is 23 mm Hg  · Normal right ventricular systolic function.        Review of Systems   Constitution: Positive for weight gain (3# on my scale but overall same). Negative for chills, fever, malaise/fatigue, night sweats and weight loss.   HENT: Negative for congestion, hoarse voice, odynophagia and sore throat.         Dental exam up to date--q 6 months   Cardiovascular: Negative for chest pain, claudication, dyspnea on exertion, irregular heartbeat, leg swelling, near-syncope, orthopnea, palpitations and paroxysmal nocturnal dyspnea.   Respiratory: Negative for cough, hemoptysis, shortness of breath, sputum production and wheezing.    Hematologic/Lymphatic: Does not bruise/bleed easily.   Skin: Negative for suspicious lesions.        Sees Derm  "yearly   Musculoskeletal: Negative for arthritis, back pain, joint pain, joint swelling and myalgias.   Gastrointestinal: Negative for abdominal pain, anorexia, change in bowel habit, diarrhea, hematemesis, hematochezia and melena.        Colonoscopy due 2021   Genitourinary: Negative for dysuria, flank pain, frequency and hematuria.        Mammogram told no change but we need to get copy of report   Neurological: Negative for brief paralysis, dizziness, focal weakness, headaches, loss of balance, numbness, paresthesias, seizures and weakness.   Psychiatric/Behavioral: Negative for altered mental status and substance abuse.     Objective:     Physical Exam   Constitutional: She is oriented to person, place, and time. Vital signs are normal. She appears well-developed and well-nourished. No distress.   /87 (BP Location: Right arm, Patient Position: Sitting, BP Method: Medium (Automatic))   Pulse 84   Ht 5' 3" (1.6 m)   Wt 59.3 kg (130 lb 11.7 oz)   LMP 03/01/2007   BMI 23.16 kg/m²   Last visit wt 58.1 kg (128 lb 1.4 oz)   HENT:   Head: Normocephalic and atraumatic.   Nose: Nose normal.   Mouth/Throat: Oropharynx is clear and moist. No oropharyngeal exudate.   Eyes: Pupils are equal, round, and reactive to light. Conjunctivae are normal. Right eye exhibits no discharge. Left eye exhibits no discharge. No scleral icterus.   Neck: Neck supple. No JVD present. No thyromegaly present.   Cardiovascular: Normal rate, regular rhythm, normal heart sounds and intact distal pulses. PMI is not displaced. Exam reveals no gallop and no friction rub.   No murmur heard.  Pulmonary/Chest: Effort normal and breath sounds normal. No respiratory distress. She has no wheezes. She has no rales.   Abdominal: Soft. Bowel sounds are normal. She exhibits no distension and no mass. There is no tenderness. There is no rebound and no guarding.   Musculoskeletal: She exhibits no edema (early varicosities) or tenderness. "   Lymphadenopathy:     She has no cervical adenopathy (also no supraclavicular or axillary).   Neurological: She is alert and oriented to person, place, and time.   Skin: Skin is warm and dry. She is not diaphoretic.   Psychiatric: She has a normal mood and affect. Her behavior is normal. Judgment and thought content normal.     Cath 4/12/19   · LVEDP (Pre): 8  · Normal coronary arteries by angiography  · CAV detect by IVUS as detailed below with no significant change compared to 4/6/18 study    ECHO 9/25/19 Conclusion      · Normal left ventricular systolic function. The estimated ejection fraction is 60%  · Normal LV diastolic function.  · Mild tricuspid regurgitation.  · Normal central venous pressure (3 mm Hg).  · The estimated PA systolic pressure is 23 mm Hg  · Normal right ventricular systolic function.        Lab Results   Component Value Date    BNP 44 09/25/2019     09/25/2019    K 4.2 09/25/2019    MG 1.7 09/25/2019     09/25/2019    CO2 23 09/25/2019    BUN 14 09/25/2019    CREATININE 0.8 09/25/2019    GLU 78 09/25/2019    HGBA1C 5.4 03/23/2016    AST 29 09/25/2019    ALT 17 09/25/2019    ALBUMIN 4.5 09/25/2019    PROT 8.2 09/25/2019    BILITOT 0.7 09/25/2019    WBC 3.62 (L) 09/25/2019    HGB 11.8 (L) 09/25/2019    HCT 39.5 09/25/2019     09/25/2019    TSH 1.103 03/19/2019    CHOL 169 09/25/2019    HDL 86 (H) 09/25/2019    LDLCALC 64.6 09/25/2019    TRIG 92 09/25/2019      TACROLIMUS pending--target level 3-6 09/25/2019    EVEROLIMUS Pending--target level 3-8 09/25/2019    ALLOMAP See result image under hyperlink 09/25/2019       Assessment:     1. Status post heart transplant    2. Cardiac allograft vasculopathy    3. Long-term use of immunosuppressant medication    4. Essential hypertension    5. Hyperlipidemia LDL goal <70      Plan:   Needs angio at year 5 (2021) and  ECHO otherwise    ECHO due March 2020  Obtain yearly gyn exam include breast and pap smear--get copy of last  mammogram  Continue yearly derm checks and follow sun precautions  Continue dental checks q 6 months  Next colonoscopy due 2021  Get flu shot  Get pneumonia vaccine  Get shingles vaccine    Problem List Items Addressed This Visit        1 - High    Cardiac allograft vasculopathy    Current Assessment & Plan     Needs angio at year 5 (2021) and  ECHO otherwise    ECHO due March 2020    Continue everolimus based regimen  Target LDL < 70 with statin         Hyperlipidemia LDL goal <70    Current Assessment & Plan     LDL at goal  Continue atorvastatin 40 mg nightly         Hypertension    Current Assessment & Plan     Target BP < 130/80  Home readings excellent and clinic reading acceptable  Continue lisinopril         Long-term use of immunosuppressant medication    Current Assessment & Plan     Immuno regimen: Prograf 1/1, Cellcept 1000 mg BID, Everolimus 2/2 Off Prednisone.  Immuno goal levels: Prograf 3-6, Everolimus 3-8   F/U on blood levels         Status post heart transplant - Primary    Current Assessment & Plan     Obtain yearly gyn exam include breast and pap smear--get copy of last mammogram  Continue yearly derm checks and follow sun precautions  Continue dental checks q 6 months  Next colonoscopy due 2021  Get flu shot  Get pneumonia vaccine  Get shingles vaccine             Return instructions as set forth by post transplant schedule or as needed:     Clinic: Return for labs and/or biopsy weekly the first month, every two weeks during month 2 and then monthly for the first year at the provider or coordinator's discretion. During the second year, return to clinic every 3 months. Post transplant year 3-5 return every 6 months. There will be a comprehensive post transplant evaluation every year that may include LHC/RHC/biopsy, stress test, echo, CXR, and other health screening exams.     In addition to the clinical assessment, I have ordered Allomap testing for this patient to assist in identification  of moderate/severe acute cellular rejection (ACR) in a pt with stable Allograft function instead of endomyocardial biopsy.      Patient is reminded to call with any health changes as these can be early signs of transplant complications. Patient is advised to make sure any new medications or changes of old medications are discussed with a pharmacist or physician knowledgeable with transplant to avoid rejection/drug toxicity related to significant drug interactions.     UNOS Patient Status  Functional Status: 100% - Normal, no complaints, no evidence of disease  Physical Capacity: No Limitations  Working for Income: Yes, full time for [employer]

## 2019-09-27 LAB
ALLOMAP TEST SCORE: NORMAL
CLASS I ANTIBODY COMMENTS - LUMINEX: NORMAL
CLASS II ANTIBODY COMMENTS - LUMINEX: NORMAL
DSA1 TESTING DATE: NORMAL
DSA12 TESTING DATE: NORMAL
DSA2 TESTING DATE: NORMAL
EVEROLIMUS BLD-MCNC: 4.5 NG/ML
SERUM COLLECTION DT - LUMINEX CLASS I: NORMAL
SERUM COLLECTION DT - LUMINEX CLASS II: NORMAL

## 2019-10-03 ENCOUNTER — PATIENT MESSAGE (OUTPATIENT)
Dept: MEDSURG UNIT | Facility: HOSPITAL | Age: 64
End: 2019-10-03

## 2019-10-03 PROBLEM — J32.9 RHINOSINUSITIS: Status: ACTIVE | Noted: 2018-04-11

## 2019-10-10 LAB
C1Q1A TESTING DATE: NORMAL
C1Q2A TESTING DATE: NORMAL
CLASS I ANTIBODY COMMENTS - LUMINEX: NORMAL
CLASS II ANTIBODY COMMENTS - LUMINEX: NORMAL
HC1QA TESTING DATE: NORMAL
SERUM COLLECTION DT - LUMINEX CLASS I: NORMAL
SERUM COLLECTION DT - LUMINEX CLASS II: NORMAL

## 2019-10-14 DIAGNOSIS — Z94.1 HEART REPLACED BY TRANSPLANT: ICD-10-CM

## 2019-10-14 RX ORDER — TACROLIMUS 1 MG/1
1 CAPSULE ORAL EVERY 12 HOURS
Qty: 240 CAPSULE | Refills: 4 | Status: SHIPPED | OUTPATIENT
Start: 2019-10-14 | End: 2020-11-06

## 2019-10-14 RX ORDER — DEXAMETHASONE 0.5 MG/5ML
SOLUTION ORAL
Qty: 120 ML | Refills: 3 | Status: SHIPPED | OUTPATIENT
Start: 2019-10-14 | End: 2020-12-15 | Stop reason: SDUPTHER

## 2019-10-16 ENCOUNTER — PATIENT MESSAGE (OUTPATIENT)
Dept: TRANSPLANT | Facility: CLINIC | Age: 64
End: 2019-10-16

## 2019-10-23 ENCOUNTER — PATIENT MESSAGE (OUTPATIENT)
Dept: TRANSPLANT | Facility: CLINIC | Age: 64
End: 2019-10-23

## 2019-10-24 ENCOUNTER — TELEPHONE (OUTPATIENT)
Dept: TRANSPLANT | Facility: CLINIC | Age: 64
End: 2019-10-24

## 2019-10-25 ENCOUNTER — PATIENT MESSAGE (OUTPATIENT)
Dept: TRANSPLANT | Facility: CLINIC | Age: 64
End: 2019-10-25

## 2019-10-30 ENCOUNTER — PATIENT MESSAGE (OUTPATIENT)
Dept: TRANSPLANT | Facility: CLINIC | Age: 64
End: 2019-10-30

## 2019-11-01 ENCOUNTER — PATIENT MESSAGE (OUTPATIENT)
Dept: TRANSPLANT | Facility: CLINIC | Age: 64
End: 2019-11-01

## 2019-11-06 ENCOUNTER — PATIENT MESSAGE (OUTPATIENT)
Dept: TRANSPLANT | Facility: CLINIC | Age: 64
End: 2019-11-06

## 2019-11-15 ENCOUNTER — PATIENT MESSAGE (OUTPATIENT)
Dept: TRANSPLANT | Facility: CLINIC | Age: 64
End: 2019-11-15

## 2019-11-19 ENCOUNTER — PATIENT MESSAGE (OUTPATIENT)
Dept: TRANSPLANT | Facility: CLINIC | Age: 64
End: 2019-11-19

## 2019-12-11 ENCOUNTER — PATIENT MESSAGE (OUTPATIENT)
Dept: TRANSPLANT | Facility: CLINIC | Age: 64
End: 2019-12-11

## 2019-12-12 RX ORDER — MYCOPHENOLATE MOFETIL 250 MG/1
1000 CAPSULE ORAL 2 TIMES DAILY
Qty: 240 CAPSULE | Refills: 11 | Status: SHIPPED | OUTPATIENT
Start: 2019-12-12 | End: 2020-12-05 | Stop reason: SDUPTHER

## 2019-12-26 ENCOUNTER — PATIENT MESSAGE (OUTPATIENT)
Dept: TRANSPLANT | Facility: CLINIC | Age: 64
End: 2019-12-26

## 2019-12-27 ENCOUNTER — PATIENT MESSAGE (OUTPATIENT)
Dept: TRANSPLANT | Facility: CLINIC | Age: 64
End: 2019-12-27

## 2019-12-28 ENCOUNTER — PATIENT MESSAGE (OUTPATIENT)
Dept: TRANSPLANT | Facility: CLINIC | Age: 64
End: 2019-12-28

## 2020-01-02 ENCOUNTER — PATIENT MESSAGE (OUTPATIENT)
Dept: TRANSPLANT | Facility: CLINIC | Age: 65
End: 2020-01-02

## 2020-01-08 DIAGNOSIS — E78.2 MIXED HYPERLIPIDEMIA: ICD-10-CM

## 2020-01-08 DIAGNOSIS — Z94.1 STATUS POST HEART TRANSPLANT: ICD-10-CM

## 2020-01-08 DIAGNOSIS — Z79.52 LONG TERM CURRENT USE OF SYSTEMIC STEROIDS: ICD-10-CM

## 2020-01-08 DIAGNOSIS — T86.20 COMPLICATION OF HEART TRANSPLANT, UNSPECIFIED COMPLICATION: ICD-10-CM

## 2020-01-08 DIAGNOSIS — R06.02 SHORTNESS OF BREATH: ICD-10-CM

## 2020-01-08 DIAGNOSIS — Z94.1 H/O HEART TRANSPLANT: ICD-10-CM

## 2020-01-08 DIAGNOSIS — T86.290 VASCULOPATHY OF CARDIAC ALLOGRAFT: ICD-10-CM

## 2020-01-08 DIAGNOSIS — Z79.899 ENCOUNTER FOR LONG-TERM (CURRENT) USE OF MEDICATIONS: ICD-10-CM

## 2020-01-09 ENCOUNTER — TELEPHONE (OUTPATIENT)
Dept: TRANSPLANT | Facility: CLINIC | Age: 65
End: 2020-01-09

## 2020-01-10 ENCOUNTER — TELEPHONE (OUTPATIENT)
Dept: TRANSPLANT | Facility: CLINIC | Age: 65
End: 2020-01-10

## 2020-01-26 ENCOUNTER — PATIENT MESSAGE (OUTPATIENT)
Dept: TRANSPLANT | Facility: CLINIC | Age: 65
End: 2020-01-26

## 2020-01-27 ENCOUNTER — PATIENT MESSAGE (OUTPATIENT)
Dept: TRANSPLANT | Facility: CLINIC | Age: 65
End: 2020-01-27

## 2020-01-27 ENCOUNTER — TELEPHONE (OUTPATIENT)
Dept: TRANSPLANT | Facility: CLINIC | Age: 65
End: 2020-01-27

## 2020-01-27 NOTE — TELEPHONE ENCOUNTER
"Pt called and stated that she went to the doctor regarding her eye. Pt reports that she went to a  and there she began to have eye irritation, swilling and redness. Pt stated that she went to her PCP who stated that she has "Pink Eye " and was given drops and ointments for her eyes and to return on Wednesday. Pt denies any fever, or other issues. Pt reports that she informed the doctor's office to fax HTS the clinic note. Verbalized understanding and informed pt to call if she has any questions or concerns.   "

## 2020-01-31 ENCOUNTER — PATIENT MESSAGE (OUTPATIENT)
Dept: TRANSPLANT | Facility: CLINIC | Age: 65
End: 2020-01-31

## 2020-02-04 ENCOUNTER — PATIENT MESSAGE (OUTPATIENT)
Dept: TRANSPLANT | Facility: CLINIC | Age: 65
End: 2020-02-04

## 2020-02-13 ENCOUNTER — PATIENT MESSAGE (OUTPATIENT)
Dept: TRANSPLANT | Facility: CLINIC | Age: 65
End: 2020-02-13

## 2020-03-05 ENCOUNTER — PATIENT MESSAGE (OUTPATIENT)
Dept: TRANSPLANT | Facility: CLINIC | Age: 65
End: 2020-03-05

## 2020-03-12 DIAGNOSIS — E78.5 HYPERLIPIDEMIA, UNSPECIFIED HYPERLIPIDEMIA TYPE: ICD-10-CM

## 2020-03-12 RX ORDER — ATORVASTATIN CALCIUM 40 MG/1
40 TABLET, FILM COATED ORAL DAILY
Qty: 30 TABLET | Refills: 11 | Status: SHIPPED | OUTPATIENT
Start: 2020-03-12 | End: 2021-03-10 | Stop reason: SDUPTHER

## 2020-03-12 RX ORDER — ATORVASTATIN CALCIUM 40 MG/1
40 TABLET, FILM COATED ORAL DAILY
Qty: 30 TABLET | Refills: 11 | Status: CANCELLED | OUTPATIENT
Start: 2020-03-12 | End: 2021-03-12

## 2020-03-13 ENCOUNTER — PATIENT MESSAGE (OUTPATIENT)
Dept: TRANSPLANT | Facility: CLINIC | Age: 65
End: 2020-03-13

## 2020-03-16 ENCOUNTER — TELEPHONE (OUTPATIENT)
Dept: TRANSPLANT | Facility: CLINIC | Age: 65
End: 2020-03-16

## 2020-03-16 NOTE — TELEPHONE ENCOUNTER
Spoke with pt regarding upcoming visit. Pt stated that she would prefer to have labs and push annual back until June.     Pt denies any c/o at present.     Faxed over lab order for labs

## 2020-03-23 ENCOUNTER — PATIENT MESSAGE (OUTPATIENT)
Dept: TRANSPLANT | Facility: CLINIC | Age: 65
End: 2020-03-23

## 2020-03-30 ENCOUNTER — TELEPHONE (OUTPATIENT)
Dept: TRANSPLANT | Facility: CLINIC | Age: 65
End: 2020-03-30

## 2020-03-30 ENCOUNTER — PATIENT MESSAGE (OUTPATIENT)
Dept: TRANSPLANT | Facility: CLINIC | Age: 65
End: 2020-03-30

## 2020-03-30 LAB
BNP: 84
EVEROLIMUS: 4.2
EXT ALBUMIN: 3.9
EXT ALT: 19
EXT AST: 26
EXT BUN: 16
EXT CALCIUM: 9.3
EXT CHLORIDE: 107
EXT CHOLESTEROL (LIPID PANEL): 167
EXT CREATININE: 0.83 MG/DL
EXT GLUCOSE: 81
EXT HDL: 76
EXT HEMATOCRIT: 37.1
EXT HEMOGLOBIN A1C: 5.3 %
EXT HEMOGLOBIN: 12.5
EXT LDL CHOLESTEROL: 75
EXT MAGNESIUM: 1.7
EXT PLATELETS: 195
EXT POTASSIUM: 5.2
EXT PROTEIN TOTAL: 6.8
EXT SODIUM: 140 MMOL/L
EXT TACROLIMUS LVL: 5
EXT TRIGLYCERIDES: 78
EXT WBC: 5.2
Lab: 0.7
T4: 11.7
TSH SERPL DL<=0.005 MIU/L-ACNC: 1.42 UIU/ML (ref 0.41–5.9)

## 2020-03-30 NOTE — TELEPHONE ENCOUNTER
Received outside labs for pt's annual. All labs within goal     Tacro  5.0 goal is 3-6  Everolimus - 4.2 - goal is 3-8     Postponed other annual labs and appts until June.No other changes at this time.

## 2020-04-20 ENCOUNTER — PATIENT MESSAGE (OUTPATIENT)
Dept: TRANSPLANT | Facility: CLINIC | Age: 65
End: 2020-04-20

## 2020-04-27 ENCOUNTER — PATIENT MESSAGE (OUTPATIENT)
Dept: TRANSPLANT | Facility: CLINIC | Age: 65
End: 2020-04-27

## 2020-04-29 ENCOUNTER — PATIENT MESSAGE (OUTPATIENT)
Dept: TRANSPLANT | Facility: CLINIC | Age: 65
End: 2020-04-29

## 2020-04-29 ENCOUNTER — DOCUMENTATION ONLY (OUTPATIENT)
Dept: TRANSPLANT | Facility: CLINIC | Age: 65
End: 2020-04-29

## 2020-05-12 RX ORDER — EVEROLIMUS 0.5 MG/1
1 TABLET ORAL 2 TIMES DAILY
Qty: 360 TABLET | Refills: 3 | Status: SHIPPED | OUTPATIENT
Start: 2020-05-12 | End: 2021-05-10 | Stop reason: SDUPTHER

## 2020-07-27 DIAGNOSIS — I10 ESSENTIAL HYPERTENSION: ICD-10-CM

## 2020-07-27 RX ORDER — LISINOPRIL 5 MG/1
TABLET ORAL
Qty: 90 TABLET | Refills: 12 | Status: SHIPPED | OUTPATIENT
Start: 2020-07-27 | End: 2021-02-02 | Stop reason: SDUPTHER

## 2020-07-27 NOTE — TELEPHONE ENCOUNTER
Returned call to pt. Answered all questions regarding COVID and appt for Thursday. Informed pt to call back If she has any questions or concerns.

## 2020-07-30 ENCOUNTER — HOSPITAL ENCOUNTER (OUTPATIENT)
Dept: CARDIOLOGY | Facility: HOSPITAL | Age: 65
Discharge: HOME | End: 2020-07-30
Attending: INTERNAL MEDICINE
Payer: COMMERCIAL

## 2020-07-30 ENCOUNTER — HOSPITAL ENCOUNTER (OUTPATIENT)
Dept: RADIOLOGY | Facility: HOSPITAL | Age: 65
Discharge: HOME | End: 2020-07-30
Attending: INTERNAL MEDICINE
Payer: COMMERCIAL

## 2020-07-30 ENCOUNTER — LAB VISIT (OUTPATIENT)
Dept: LAB | Facility: HOSPITAL | Age: 65
End: 2020-07-30
Attending: INTERNAL MEDICINE
Payer: COMMERCIAL

## 2020-07-30 ENCOUNTER — OFFICE VISIT (OUTPATIENT)
Dept: TRANSPLANT | Facility: CLINIC | Age: 65
End: 2020-07-30
Payer: COMMERCIAL

## 2020-07-30 VITALS
HEART RATE: 97 BPM | BODY MASS INDEX: 23.55 KG/M2 | BODY MASS INDEX: 23.04 KG/M2 | WEIGHT: 130 LBS | WEIGHT: 132.94 LBS | HEIGHT: 63 IN | HEIGHT: 63 IN | SYSTOLIC BLOOD PRESSURE: 156 MMHG | DIASTOLIC BLOOD PRESSURE: 80 MMHG

## 2020-07-30 DIAGNOSIS — R06.02 SHORTNESS OF BREATH: ICD-10-CM

## 2020-07-30 DIAGNOSIS — Z79.899 ENCOUNTER FOR LONG-TERM (CURRENT) USE OF MEDICATIONS: ICD-10-CM

## 2020-07-30 DIAGNOSIS — T86.290 VASCULOPATHY OF CARDIAC ALLOGRAFT: ICD-10-CM

## 2020-07-30 DIAGNOSIS — Z94.1 STATUS POST HEART TRANSPLANT: ICD-10-CM

## 2020-07-30 DIAGNOSIS — T38.0X5D ADVERSE EFFECT OF GLUCOCORTICOID OR SYNTHETIC ANALOGUE, SUBSEQUENT ENCOUNTER: ICD-10-CM

## 2020-07-30 DIAGNOSIS — Z79.52 LONG TERM CURRENT USE OF SYSTEMIC STEROIDS: ICD-10-CM

## 2020-07-30 DIAGNOSIS — E78.2 MIXED HYPERLIPIDEMIA: ICD-10-CM

## 2020-07-30 DIAGNOSIS — D84.9 IMMUNOSUPPRESSION: ICD-10-CM

## 2020-07-30 DIAGNOSIS — T86.20 COMPLICATION OF HEART TRANSPLANT, UNSPECIFIED COMPLICATION: ICD-10-CM

## 2020-07-30 DIAGNOSIS — T86.290 CARDIAC ALLOGRAFT VASCULOPATHY: ICD-10-CM

## 2020-07-30 DIAGNOSIS — Z94.1 HEART TRANSPLANTED: Primary | ICD-10-CM

## 2020-07-30 LAB
ALBUMIN SERPL BCP-MCNC: 4.1 G/DL (ref 3.5–5.2)
ALP SERPL-CCNC: 55 U/L (ref 55–135)
ALT SERPL W/O P-5'-P-CCNC: 18 U/L (ref 10–44)
ANION GAP SERPL CALC-SCNC: 10 MMOL/L (ref 8–16)
ASCENDING AORTA: 3.18 CM
AST SERPL-CCNC: 26 U/L (ref 10–40)
BASOPHILS # BLD AUTO: 0.03 K/UL (ref 0–0.2)
BASOPHILS NFR BLD: 0.6 % (ref 0–1.9)
BILIRUB SERPL-MCNC: 0.7 MG/DL (ref 0.1–1)
BNP SERPL-MCNC: 42 PG/ML (ref 0–99)
BSA FOR ECHO PROCEDURE: 1.62 M2
BUN SERPL-MCNC: 15 MG/DL (ref 8–23)
CALCIUM SERPL-MCNC: 9.2 MG/DL (ref 8.7–10.5)
CHLORIDE SERPL-SCNC: 104 MMOL/L (ref 95–110)
CHOLEST SERPL-MCNC: 163 MG/DL (ref 120–199)
CHOLEST/HDLC SERPL: 2 {RATIO} (ref 2–5)
CO2 SERPL-SCNC: 24 MMOL/L (ref 23–29)
CREAT SERPL-MCNC: 0.8 MG/DL (ref 0.5–1.4)
CREAT UR-MCNC: 146 MG/DL (ref 15–325)
CV ECHO LV RWT: 0.49 CM
CV STRESS BASE HR: 87 BPM
DIASTOLIC BLOOD PRESSURE: 79 MMHG
DIFFERENTIAL METHOD: ABNORMAL
DOP CALC LVOT AREA: 4 CM2
DOP CALC LVOT DIAMETER: 2.27 CM
DOP CALC LVOT PEAK VEL: 0.94 M/S
DOP CALC LVOT STROKE VOLUME: 78.15 CM3
DOP CALCLVOT PEAK VEL VTI: 19.32 CM
E WAVE DECELERATION TIME: 148.99 MSEC
E/A RATIO: 2.07
E/E' RATIO: 12.43 M/S
ECHO LV POSTERIOR WALL: 0.97 CM (ref 0.6–1.1)
EOSINOPHIL # BLD AUTO: 0.1 K/UL (ref 0–0.5)
EOSINOPHIL NFR BLD: 1.1 % (ref 0–8)
ERYTHROCYTE [DISTWIDTH] IN BLOOD BY AUTOMATED COUNT: 13.6 % (ref 11.5–14.5)
EST. GFR  (AFRICAN AMERICAN): >60 ML/MIN/1.73 M^2
EST. GFR  (NON AFRICAN AMERICAN): >60 ML/MIN/1.73 M^2
FERRITIN SERPL-MCNC: 74 NG/ML (ref 20–300)
FRACTIONAL SHORTENING: 31 % (ref 28–44)
GLUCOSE SERPL-MCNC: 77 MG/DL (ref 70–110)
HCT VFR BLD AUTO: 35.6 % (ref 37–48.5)
HDLC SERPL-MCNC: 80 MG/DL (ref 40–75)
HDLC SERPL: 49.1 % (ref 20–50)
HGB BLD-MCNC: 11.3 G/DL (ref 12–16)
IMM GRANULOCYTES # BLD AUTO: 0.01 K/UL (ref 0–0.04)
IMM GRANULOCYTES NFR BLD AUTO: 0.2 % (ref 0–0.5)
INTERVENTRICULAR SEPTUM: 1.01 CM (ref 0.6–1.1)
IRON SERPL-MCNC: 104 UG/DL (ref 30–160)
LDLC SERPL CALC-MCNC: 68.4 MG/DL (ref 63–159)
LEFT INTERNAL DIMENSION IN SYSTOLE: 2.74 CM (ref 2.1–4)
LEFT VENTRICLE DIASTOLIC VOLUME INDEX: 43.31 ML/M2
LEFT VENTRICLE DIASTOLIC VOLUME: 69.74 ML
LEFT VENTRICLE MASS INDEX: 77 G/M2
LEFT VENTRICLE SYSTOLIC VOLUME INDEX: 17.3 ML/M2
LEFT VENTRICLE SYSTOLIC VOLUME: 27.91 ML
LEFT VENTRICULAR INTERNAL DIMENSION IN DIASTOLE: 3.99 CM (ref 3.5–6)
LEFT VENTRICULAR MASS: 124.78 G
LV LATERAL E/E' RATIO: 9.67 M/S
LV SEPTAL E/E' RATIO: 17.4 M/S
LYMPHOCYTES # BLD AUTO: 1.6 K/UL (ref 1–4.8)
LYMPHOCYTES NFR BLD: 33.4 % (ref 18–48)
MAGNESIUM SERPL-MCNC: 1.6 MG/DL (ref 1.6–2.6)
MCH RBC QN AUTO: 27.6 PG (ref 27–31)
MCHC RBC AUTO-ENTMCNC: 31.7 G/DL (ref 32–36)
MCV RBC AUTO: 87 FL (ref 82–98)
MONOCYTES # BLD AUTO: 0.5 K/UL (ref 0.3–1)
MONOCYTES NFR BLD: 11.6 % (ref 4–15)
MV PEAK A VEL: 0.42 M/S
MV PEAK E VEL: 0.87 M/S
MV STENOSIS PRESSURE HALF TIME: 43.21 MS
MV VALVE AREA P 1/2 METHOD: 5.09 CM2
NEUTROPHILS # BLD AUTO: 2.5 K/UL (ref 1.8–7.7)
NEUTROPHILS NFR BLD: 53.1 % (ref 38–73)
NONHDLC SERPL-MCNC: 83 MG/DL
NRBC BLD-RTO: 0 /100 WBC
OHS CV CPX 1 MINUTE RECOVERY HEART RATE: 139 BPM
OHS CV CPX 85 PERCENT MAX PREDICTED HEART RATE MALE: 128
OHS CV CPX ESTIMATED METS: 9
OHS CV CPX MAX PREDICTED HEART RATE: 151
OHS CV CPX PATIENT IS FEMALE: 1
OHS CV CPX PATIENT IS MALE: 0
OHS CV CPX PEAK DIASTOLIC BLOOD PRESSURE: 93 MMHG
OHS CV CPX PEAK HEAR RATE: 153 BPM
OHS CV CPX PEAK RATE PRESSURE PRODUCT: NORMAL
OHS CV CPX PEAK SYSTOLIC BLOOD PRESSURE: 241 MMHG
OHS CV CPX PERCENT MAX PREDICTED HEART RATE ACHIEVED: 102
OHS CV CPX RATE PRESSURE PRODUCT PRESENTING: NORMAL
PISA TR MAX VEL: 2.32 M/S
PLATELET # BLD AUTO: 208 K/UL (ref 150–350)
PMV BLD AUTO: 10.8 FL (ref 9.2–12.9)
POTASSIUM SERPL-SCNC: 3.7 MMOL/L (ref 3.5–5.1)
PROT SERPL-MCNC: 7.5 G/DL (ref 6–8.4)
PROT UR-MCNC: 14 MG/DL (ref 0–15)
PROT/CREAT UR: 0.1 MG/G{CREAT} (ref 0–0.2)
PULM VEIN S/D RATIO: 0.74
PV PEAK D VEL: 0.61 M/S
PV PEAK S VEL: 0.45 M/S
RA PRESSURE: 3 MMHG
RBC # BLD AUTO: 4.1 M/UL (ref 4–5.4)
RIGHT VENTRICULAR END-DIASTOLIC DIMENSION: 2.59 CM
RV TISSUE DOPPLER FREE WALL SYSTOLIC VELOCITY 1 (APICAL 4 CHAMBER VIEW): 7.45 CM/S
SATURATED IRON: 30 % (ref 20–50)
SINUS: 3.23 CM
SODIUM SERPL-SCNC: 138 MMOL/L (ref 136–145)
STJ: 3.2 CM
STRESS ECHO POST EXERCISE DUR MIN: 5 MINUTES
STRESS ECHO POST EXERCISE DUR SEC: 55 SECONDS
SYSTOLIC BLOOD PRESSURE: 147 MMHG
T4 SERPL-MCNC: 12.9 UG/DL (ref 4.5–11.5)
TACROLIMUS BLD-MCNC: 4.2 NG/ML (ref 5–15)
TDI LATERAL: 0.09 M/S
TDI SEPTAL: 0.05 M/S
TDI: 0.07 M/S
TOTAL IRON BINDING CAPACITY: 351 UG/DL (ref 250–450)
TR MAX PG: 22 MMHG
TRANSFERRIN SERPL-MCNC: 237 MG/DL (ref 200–375)
TRICUSPID ANNULAR PLANE SYSTOLIC EXCURSION: 1.07 CM
TRIGL SERPL-MCNC: 73 MG/DL (ref 30–150)
TSH SERPL DL<=0.005 MIU/L-ACNC: 0.91 UIU/ML (ref 0.4–4)
TV REST PULMONARY ARTERY PRESSURE: 25 MMHG
WBC # BLD AUTO: 4.67 K/UL (ref 3.9–12.7)

## 2020-07-30 PROCEDURE — 86977 RBC SERUM PRETX INCUBJ/INHIB: CPT | Mod: 59,PO

## 2020-07-30 PROCEDURE — 36415 COLL VENOUS BLD VENIPUNCTURE: CPT

## 2020-07-30 PROCEDURE — 71046 X-RAY EXAM CHEST 2 VIEWS: CPT | Mod: 26,,, | Performed by: RADIOLOGY

## 2020-07-30 PROCEDURE — 3077F SYST BP >= 140 MM HG: CPT | Mod: CPTII,S$GLB,, | Performed by: INTERNAL MEDICINE

## 2020-07-30 PROCEDURE — 99999 PR PBB SHADOW E&M-EST. PATIENT-LVL III: CPT | Mod: PBBFAC,,, | Performed by: INTERNAL MEDICINE

## 2020-07-30 PROCEDURE — 83880 ASSAY OF NATRIURETIC PEPTIDE: CPT

## 2020-07-30 PROCEDURE — 80169 DRUG ASSAY EVEROLIMUS: CPT

## 2020-07-30 PROCEDURE — 80061 LIPID PANEL: CPT

## 2020-07-30 PROCEDURE — 71046 X-RAY EXAM CHEST 2 VIEWS: CPT | Mod: TC,FY

## 2020-07-30 PROCEDURE — 86832 HLA CLASS I HIGH DEFIN QUAL: CPT | Mod: 59,PO

## 2020-07-30 PROCEDURE — 99999 PR PBB SHADOW E&M-EST. PATIENT-LVL III: ICD-10-PCS | Mod: PBBFAC,,, | Performed by: INTERNAL MEDICINE

## 2020-07-30 PROCEDURE — 83540 ASSAY OF IRON: CPT

## 2020-07-30 PROCEDURE — 99214 OFFICE O/P EST MOD 30 MIN: CPT | Mod: S$GLB,,, | Performed by: INTERNAL MEDICINE

## 2020-07-30 PROCEDURE — 3077F PR MOST RECENT SYSTOLIC BLOOD PRESSURE >= 140 MM HG: ICD-10-PCS | Mod: CPTII,S$GLB,, | Performed by: INTERNAL MEDICINE

## 2020-07-30 PROCEDURE — 3008F BODY MASS INDEX DOCD: CPT | Mod: CPTII,S$GLB,, | Performed by: INTERNAL MEDICINE

## 2020-07-30 PROCEDURE — 82728 ASSAY OF FERRITIN: CPT

## 2020-07-30 PROCEDURE — 84436 ASSAY OF TOTAL THYROXINE: CPT

## 2020-07-30 PROCEDURE — 85025 COMPLETE CBC W/AUTO DIFF WBC: CPT

## 2020-07-30 PROCEDURE — 93351 STRESS ECHO (CUPID ONLY): ICD-10-PCS | Mod: 26,,, | Performed by: INTERNAL MEDICINE

## 2020-07-30 PROCEDURE — 86833 HLA CLASS II HIGH DEFIN QUAL: CPT | Mod: 59,PO

## 2020-07-30 PROCEDURE — 3079F PR MOST RECENT DIASTOLIC BLOOD PRESSURE 80-89 MM HG: ICD-10-PCS | Mod: CPTII,S$GLB,, | Performed by: INTERNAL MEDICINE

## 2020-07-30 PROCEDURE — 3008F PR BODY MASS INDEX (BMI) DOCUMENTED: ICD-10-PCS | Mod: CPTII,S$GLB,, | Performed by: INTERNAL MEDICINE

## 2020-07-30 PROCEDURE — 84443 ASSAY THYROID STIM HORMONE: CPT

## 2020-07-30 PROCEDURE — 83735 ASSAY OF MAGNESIUM: CPT

## 2020-07-30 PROCEDURE — 80053 COMPREHEN METABOLIC PANEL: CPT

## 2020-07-30 PROCEDURE — 93351 STRESS TTE COMPLETE: CPT | Mod: 26,,, | Performed by: INTERNAL MEDICINE

## 2020-07-30 PROCEDURE — 86833 HLA CLASS II HIGH DEFIN QUAL: CPT | Mod: PO

## 2020-07-30 PROCEDURE — 84439 ASSAY OF FREE THYROXINE: CPT

## 2020-07-30 PROCEDURE — 93351 STRESS TTE COMPLETE: CPT

## 2020-07-30 PROCEDURE — 80197 ASSAY OF TACROLIMUS: CPT

## 2020-07-30 PROCEDURE — 71046 XR CHEST PA AND LATERAL: ICD-10-PCS | Mod: 26,,, | Performed by: RADIOLOGY

## 2020-07-30 PROCEDURE — 99214 PR OFFICE/OUTPT VISIT, EST, LEVL IV, 30-39 MIN: ICD-10-PCS | Mod: S$GLB,,, | Performed by: INTERNAL MEDICINE

## 2020-07-30 PROCEDURE — 3079F DIAST BP 80-89 MM HG: CPT | Mod: CPTII,S$GLB,, | Performed by: INTERNAL MEDICINE

## 2020-07-30 PROCEDURE — 82570 ASSAY OF URINE CREATININE: CPT

## 2020-07-30 PROCEDURE — 86832 HLA CLASS I HIGH DEFIN QUAL: CPT | Mod: PO

## 2020-07-30 NOTE — LETTER
August 10, 2020        Esha Tee  [address]  [place] [address]  Phone: [phone]  Fax: [phone]             [id number]  [address]  [facility] [address]  Phone: [phone]   Patient: Cassandra Gonzales   MR Number: [id number]   YOB: 1955   Date of Visit: 7/30/2020       Dear Dr. Esha Tee    Thank you for referring Cassandra Gonzales to me for evaluation. Attached you will find relevant portions of my assessment and plan of care.    If you have questions, please do not hesitate to call me. I look forward to following Cassandra Gonzales along with you.    Sincerely,    Alyssa Negron MD    Enclosure    If you would like to receive this communication electronically, please contact [email or web address] or [phone] to request [employer] Link access.    [employer] Link is a tool which provides read-only access to select patient information with whom you have a relationship. Its easy to use and provides real time access to review your patients record including encounter summaries, notes, results, and demographic information.    If you feel you have received this communication in error or would no longer like to receive these types of communications, please e-mail [email or web address]

## 2020-08-01 LAB — EVEROLIMUS BLD-MCNC: 3.5 NG/ML

## 2020-08-03 LAB — ALLOMAP TEST SCORE: NORMAL

## 2020-08-04 ENCOUNTER — TELEPHONE (OUTPATIENT)
Dept: TRANSPLANT | Facility: CLINIC | Age: 65
End: 2020-08-04

## 2020-08-04 NOTE — TELEPHONE ENCOUNTER
Called Dr. Baker's office - reviewed dexa- scan completed in 2018 no date to repeat on result-- pt has one ordered for her upcoming visit.     Asked to have dexa from 2018- notes from provider and recommendations - faxed over to put in chart- reviewed media     Confirmed fax number-

## 2020-08-05 LAB — T4 FREE SERPL DIALY-MCNC: 3 NG/DL (ref 0.8–2)

## 2020-08-06 ENCOUNTER — CONFERENCE (OUTPATIENT)
Dept: TRANSPLANT | Facility: CLINIC | Age: 65
End: 2020-08-06

## 2020-08-06 DIAGNOSIS — E05.90 HYPERTHYROIDISM: Primary | ICD-10-CM

## 2020-08-06 NOTE — TELEPHONE ENCOUNTER
Chart reviewed for four year annual   (1) labs reviewed  crt 0.8 / tacro 4.2 / everolimus 4.2  (2) studies negative DSE   (3) Changes none   (4) immunosuppression Immunosuppression: Tacro   1 BID /cellcept 1000 BID       /Everolimus  1 BID   Immuno goal levels: Prograf 3-6, Everolimus 3-8  (5) followup will refer to endocrine for asymptomatic hyperthyroidism

## 2020-08-07 PROBLEM — J32.9 CHRONIC SINUSITIS: Status: ACTIVE | Noted: 2020-08-07

## 2020-08-11 NOTE — PROGRESS NOTES
Subjective:   Ms. Gonzales is a 64 y.o. year old White female who received a  - brain death heart transplant on 3/25/16.       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    Patient looks and feels great. Patient denies N/V/F/C, lightheadedness, dizziness, PND, orthopnea, LE edema, abdominal pain, abdominal pressure, chest pain, chest pressure, syncope, pre-syncope. She states she is exercising, walking a lot, very happy with how she is doing. Blood pressure here was elevated but home BP is always stable and at the highest mid 130s.     Immuno regimen: Prograf , Cellcept 1000 mg BID, Everolimus 2/2 Off Prednisone.     20 Exercise stress echo:  · Post cardiac transplantation study (OHTx 3/24/2016)  · Normal left ventricular systolic function. The estimated ejection fraction is 60%.  · Low normal right ventricular systolic function.  · Normal LV diastolic function.  · The estimated PA systolic pressure is 25 mmHg.  · Normal central venous pressure (3 mmHg).  · The test was stopped because the patient experienced fatigue.  · There were no arrhythmias during stress.  · The patient's exercise capacity was normal.  · The ECG portion of this study is abnormal but not diagnostic for ischemia.  · The stress echo portion of this study is negative for myocardial ischemia.  · Overall, this study is negative for myocardial ischemia.    LAST ECHO AND CATH  Cath 19   · LVEDP (Pre): 8  · Normal coronary arteries by angiography  · CAV detect by IVUS as detailed below with no significant change compared to 18 study    Review of Systems  "  Constitution: Negative for chills, fever, malaise/fatigue, night sweats, weight gain and weight loss.   HENT: Negative for congestion, hoarse voice, odynophagia and sore throat.         Dental exam up to date--q 6 months   Cardiovascular: Negative for chest pain, claudication, dyspnea on exertion, irregular heartbeat, leg swelling, near-syncope, orthopnea, palpitations and paroxysmal nocturnal dyspnea.   Respiratory: Negative for cough, hemoptysis, shortness of breath, sputum production and wheezing.    Hematologic/Lymphatic: Does not bruise/bleed easily.   Skin: Negative for suspicious lesions.        Sees Derm yearly   Musculoskeletal: Negative for arthritis, back pain, joint pain, joint swelling and myalgias.   Gastrointestinal: Negative for abdominal pain, anorexia, change in bowel habit, diarrhea, hematemesis, hematochezia and melena.        Colonoscopy due 2021   Genitourinary: Negative for dysuria, flank pain, frequency and hematuria.   Neurological: Negative for brief paralysis, dizziness, focal weakness, headaches, loss of balance, numbness, paresthesias, seizures and weakness.   Psychiatric/Behavioral: Negative for altered mental status and substance abuse.     Objective:     Physical Exam   Constitutional: She is oriented to person, place, and time. Vital signs are normal. She appears well-developed and well-nourished. No distress.   BP (!) 156/80 (BP Location: Left arm, Patient Position: Sitting, BP Method: Medium (Automatic))   Pulse 97   Ht 5' 3" (1.6 m)   Wt 60.3 kg (132 lb 15 oz)   LMP 03/01/2007   BMI 23.55 kg/m²    HENT:   Head: Normocephalic and atraumatic.   Nose: Nose normal.   Mouth/Throat: Oropharynx is clear and moist. No oropharyngeal exudate.   Eyes: Pupils are equal, round, and reactive to light. Conjunctivae are normal. Right eye exhibits no discharge. Left eye exhibits no discharge. No scleral icterus.   Neck: Neck supple. No JVD present. No thyromegaly present.   Cardiovascular: " Normal rate, regular rhythm, normal heart sounds and intact distal pulses. PMI is not displaced. Exam reveals no gallop and no friction rub.   No murmur heard.  Pulmonary/Chest: Effort normal and breath sounds normal. No respiratory distress. She has no wheezes. She has no rales.   Abdominal: Soft. Bowel sounds are normal. She exhibits no distension and no mass. There is no abdominal tenderness. There is no rebound and no guarding.   Musculoskeletal:         General: No tenderness or edema.   Lymphadenopathy:     She has no cervical adenopathy (also no supraclavicular or axillary).   Neurological: She is alert and oriented to person, place, and time.   Skin: Skin is warm and dry. She is not diaphoretic.   Psychiatric: She has a normal mood and affect. Her behavior is normal. Judgment and thought content normal.   Nursing note and vitals reviewed.    Cath 4/12/19   · LVEDP (Pre): 8  · Normal coronary arteries by angiography  · CAV detect by IVUS as detailed below with no significant change compared to 4/6/18 study    ECHO 9/25/19 Conclusion      · Normal left ventricular systolic function. The estimated ejection fraction is 60%  · Normal LV diastolic function.  · Mild tricuspid regurgitation.  · Normal central venous pressure (3 mm Hg).  · The estimated PA systolic pressure is 23 mm Hg  · Normal right ventricular systolic function.        Lab Results   Component Value Date    BNP 42 07/30/2020     07/30/2020    K 3.7 07/30/2020    MG 1.6 07/30/2020     07/30/2020    CO2 24 07/30/2020    BUN 15 07/30/2020    CREATININE 0.8 07/30/2020    GLU 77 07/30/2020    HGBA1C 5.3 03/24/2020    HGBA1C 5.3 03/24/2020    AST 26 07/30/2020    ALT 18 07/30/2020    ALBUMIN 4.1 07/30/2020    PROT 7.5 07/30/2020    BILITOT 0.7 07/30/2020    CHOL 163 07/30/2020    HDL 80 (H) 07/30/2020    LDLCALC 68.4 07/30/2020    TRIG 73 07/30/2020       Magnesium   Date Value Ref Range Status   07/30/2020 1.6 1.6 - 2.6 mg/dL Final       Lab  Results   Component Value Date    WBC 4.67 07/30/2020    HGB 11.3 (L) 07/30/2020    HCT 35.6 (L) 07/30/2020    MCV 87 07/30/2020     07/30/2020       Lab Results   Component Value Date    INR 1.0 10/27/2016    INR 1.0 10/26/2016    INR 1.0 10/25/2016       BNP   Date Value Ref Range Status   07/30/2020 42 0 - 99 pg/mL Final     Comment:     Values of less than 100 pg/ml are consistent with non-CHF populations.   09/25/2019 44 0 - 99 pg/mL Final     Comment:     Values of less than 100 pg/ml are consistent with non-CHF populations.   03/19/2019 36 0 - 99 pg/mL Final     Comment:     Values of less than 100 pg/ml are consistent with non-CHF populations.       Assessment:     1. Heart transplanted    2. Cardiac allograft vasculopathy    3. Immunosuppression    4. Adverse effect of glucocorticoid or synthetic analogue, subsequent encounter      Plan:   Patient is doing well, will follow up on immunosuppression and adjust as indicated.   Will check blood pressures at home and see if it is running high, will uptitrate BP med if indicated by logs for a couple of weeks.     Problem List Items Addressed This Visit     Heart transplanted - Primary    Adverse effect of glucocorticoid or synthetic analogue    Immunosuppression    Cardiac allograft vasculopathy        Return instructions as set forth by post transplant schedule or as needed:     Clinic: Return for labs and/or biopsy weekly the first month, every two weeks during month 2 and then monthly for the first year at the provider or coordinator's discretion. During the second year, return to clinic every 3 months. Post transplant year 3-5 return every 6 months. There will be a comprehensive post transplant evaluation every year that may include LHC/RHC/biopsy, stress test, echo, CXR, and other health screening exams.     In addition to the clinical assessment, I have ordered Allomap testing for this patient to assist in identification of moderate/severe acute  cellular rejection (ACR) in a pt with stable Allograft function instead of endomyocardial biopsy.      Patient is reminded to call with any health changes as these can be early signs of transplant complications. Patient is advised to make sure any new medications or changes of old medications are discussed with a pharmacist or physician knowledgeable with transplant to avoid rejection/drug toxicity related to significant drug interactions.     UNOS Patient Status  Functional Status: 100% - Normal, no complaints, no evidence of disease  Physical Capacity: No Limitations  Working for Income: Yes, full time for [employer]

## 2020-08-17 ENCOUNTER — TELEPHONE (OUTPATIENT)
Dept: TRANSPLANT | Facility: CLINIC | Age: 65
End: 2020-08-17

## 2020-08-17 LAB
Lab: 109
T4: 12.5
TSH: 1.43

## 2020-09-08 ENCOUNTER — TELEPHONE (OUTPATIENT)
Dept: TRANSPLANT | Facility: CLINIC | Age: 65
End: 2020-09-08

## 2020-11-03 ENCOUNTER — TELEPHONE (OUTPATIENT)
Dept: TRANSPLANT | Facility: CLINIC | Age: 65
End: 2020-11-03

## 2020-11-05 ENCOUNTER — TELEPHONE (OUTPATIENT)
Dept: TRANSPLANT | Facility: CLINIC | Age: 65
End: 2020-11-05

## 2020-11-05 ENCOUNTER — PATIENT MESSAGE (OUTPATIENT)
Dept: TRANSPLANT | Facility: CLINIC | Age: 65
End: 2020-11-05

## 2020-11-05 NOTE — TELEPHONE ENCOUNTER
Pt called and stated that she would get her 3 month follow up labs in December. Pt reports no issues at this time.  Pt report that she has had her Flu and pneumonia vaccine. Input in Care Gaps/ Health Maintenance. Pt reports that she has been doing well, and will send message or email with copy of shingles vaccine. Verbalized understanding, no other questions asked.

## 2020-11-10 ENCOUNTER — TELEPHONE (OUTPATIENT)
Dept: TRANSPLANT | Facility: CLINIC | Age: 65
End: 2020-11-10

## 2020-11-13 ENCOUNTER — PATIENT MESSAGE (OUTPATIENT)
Dept: TRANSPLANT | Facility: CLINIC | Age: 65
End: 2020-11-13

## 2020-12-06 RX ORDER — MYCOPHENOLATE MOFETIL 250 MG/1
1000 CAPSULE ORAL 2 TIMES DAILY
Qty: 240 CAPSULE | Refills: 11 | Status: SHIPPED | OUTPATIENT
Start: 2020-12-06 | End: 2021-12-10

## 2020-12-10 DIAGNOSIS — Z94.1 HEART TRANSPLANTED: Primary | ICD-10-CM

## 2020-12-10 NOTE — PROGRESS NOTES
Scheduled pt's 5 year annual inMarch,   Pt 's see local derm, eye, and PCP follows with her DEXA scans, mammogram and pap- completed outpt- PCP

## 2020-12-15 ENCOUNTER — PATIENT MESSAGE (OUTPATIENT)
Dept: TRANSPLANT | Facility: CLINIC | Age: 65
End: 2020-12-15

## 2020-12-15 DIAGNOSIS — Z94.1 HEART REPLACED BY TRANSPLANT: ICD-10-CM

## 2020-12-15 RX ORDER — DEXAMETHASONE 0.5 MG/5ML
SOLUTION ORAL
Qty: 120 ML | Refills: 3 | Status: SHIPPED | OUTPATIENT
Start: 2020-12-15 | End: 2021-12-15

## 2020-12-15 NOTE — TELEPHONE ENCOUNTER
Pt asked for a refill of her Dexamethasone prescription. Pt stated that she bit down on her lip and wanted use her rinse for her sore, as she has done prior. Verbalized understanding. Sent refill to pt's local pharmacy.

## 2020-12-29 ENCOUNTER — PATIENT MESSAGE (OUTPATIENT)
Dept: TRANSPLANT | Facility: CLINIC | Age: 65
End: 2020-12-29

## 2021-01-04 ENCOUNTER — TELEPHONE (OUTPATIENT)
Dept: TRANSPLANT | Facility: CLINIC | Age: 66
End: 2021-01-04

## 2021-01-05 ENCOUNTER — HISTORICAL (OUTPATIENT)
Dept: ADMINISTRATIVE | Facility: HOSPITAL | Age: 66
End: 2021-01-05

## 2021-01-05 ENCOUNTER — PATIENT MESSAGE (OUTPATIENT)
Dept: TRANSPLANT | Facility: CLINIC | Age: 66
End: 2021-01-05

## 2021-01-05 LAB
ALBUMIN SERPL-MCNC: 4 GM/DL (ref 3.4–4.8)
ALBUMIN/GLOB SERPL: 1.4 RATIO (ref 1.1–2)
ALP SERPL-CCNC: 56 UNIT/L (ref 40–150)
ALT SERPL-CCNC: 17 UNIT/L (ref 0–55)
AST SERPL-CCNC: 25 UNIT/L (ref 5–34)
BILIRUB SERPL-MCNC: 0.6 MG/DL
BILIRUBIN DIRECT+TOT PNL SERPL-MCNC: 0.3 MG/DL (ref 0–0.5)
BILIRUBIN DIRECT+TOT PNL SERPL-MCNC: 0.3 MG/DL (ref 0–0.8)
BNP BLD-MCNC: 67.8 PG/ML
BUN SERPL-MCNC: 10.9 MG/DL (ref 9.8–20.1)
CALCIUM SERPL-MCNC: 8.9 MG/DL (ref 8.4–10.2)
CHLORIDE SERPL-SCNC: 104 MMOL/L (ref 98–107)
CHOLEST SERPL-MCNC: 163 MG/DL
CHOLEST/HDLC SERPL: 2 {RATIO} (ref 0–5)
CO2 SERPL-SCNC: 26 MMOL/L (ref 23–31)
CREAT SERPL-MCNC: 0.76 MG/DL (ref 0.55–1.02)
ERYTHROCYTE [DISTWIDTH] IN BLOOD BY AUTOMATED COUNT: 14.1 % (ref 11.5–17)
GLOBULIN SER-MCNC: 2.9 GM/DL (ref 2.4–3.5)
GLUCOSE SERPL-MCNC: 83 MG/DL (ref 82–115)
HCT VFR BLD AUTO: 37.7 % (ref 37–47)
HDLC SERPL-MCNC: 71 MG/DL (ref 35–60)
HGB BLD-MCNC: 11.3 GM/DL (ref 12–16)
LDLC SERPL CALC-MCNC: 76 MG/DL (ref 50–140)
MAGNESIUM SERPL-MCNC: 1.7 MG/DL (ref 1.6–2.6)
MCH RBC QN AUTO: 26.8 PG (ref 27–31)
MCHC RBC AUTO-ENTMCNC: 30 GM/DL (ref 33–36)
MCV RBC AUTO: 89.5 FL (ref 80–94)
PLATELET # BLD AUTO: 227 X10(3)/MCL (ref 130–400)
PMV BLD AUTO: 10.3 FL (ref 9.4–12.4)
POTASSIUM SERPL-SCNC: 4.1 MMOL/L (ref 3.5–5.1)
PROT SERPL-MCNC: 6.9 GM/DL (ref 5.8–7.6)
RBC # BLD AUTO: 4.21 X10(6)/MCL (ref 4.2–5.4)
SODIUM SERPL-SCNC: 140 MMOL/L (ref 136–145)
TRIGL SERPL-MCNC: 81 MG/DL (ref 37–140)
VLDLC SERPL CALC-MCNC: 16 MG/DL
WBC # SPEC AUTO: 4.9 X10(3)/MCL (ref 4.5–11.5)

## 2021-01-08 ENCOUNTER — PATIENT MESSAGE (OUTPATIENT)
Dept: TRANSPLANT | Facility: CLINIC | Age: 66
End: 2021-01-08

## 2021-01-11 ENCOUNTER — TELEPHONE (OUTPATIENT)
Dept: TRANSPLANT | Facility: CLINIC | Age: 66
End: 2021-01-11

## 2021-01-11 LAB — EXT TACROLIMUS LVL: 2.5

## 2021-01-12 ENCOUNTER — HISTORICAL (OUTPATIENT)
Dept: ADMINISTRATIVE | Facility: HOSPITAL | Age: 66
End: 2021-01-12

## 2021-01-12 LAB
ABS NEUT (OLG): 3.96 X10(3)/MCL (ref 2.1–9.2)
ALBUMIN SERPL-MCNC: 4.2 GM/DL (ref 3.4–4.8)
ALBUMIN/GLOB SERPL: 1.4 RATIO (ref 1.1–2)
ALP SERPL-CCNC: 60 UNIT/L (ref 40–150)
ALT SERPL-CCNC: 18 UNIT/L (ref 0–55)
AST SERPL-CCNC: 24 UNIT/L (ref 5–34)
BASOPHILS # BLD AUTO: 0 X10(3)/MCL (ref 0–0.2)
BASOPHILS NFR BLD AUTO: 1 %
BILIRUB SERPL-MCNC: 0.5 MG/DL
BILIRUBIN DIRECT+TOT PNL SERPL-MCNC: 0.2 MG/DL (ref 0–0.5)
BILIRUBIN DIRECT+TOT PNL SERPL-MCNC: 0.3 MG/DL (ref 0–0.8)
BNP BLD-MCNC: 49.4 PG/ML
BUN SERPL-MCNC: 15.8 MG/DL (ref 9.8–20.1)
CALCIUM SERPL-MCNC: 8.8 MG/DL (ref 8.4–10.2)
CHLORIDE SERPL-SCNC: 104 MMOL/L (ref 98–107)
CHOLEST SERPL-MCNC: 156 MG/DL
CHOLEST/HDLC SERPL: 2 {RATIO} (ref 0–5)
CO2 SERPL-SCNC: 22 MMOL/L (ref 23–31)
CREAT SERPL-MCNC: 0.77 MG/DL (ref 0.55–1.02)
EOSINOPHIL # BLD AUTO: 0 X10(3)/MCL (ref 0–0.9)
EOSINOPHIL NFR BLD AUTO: 0 %
ERYTHROCYTE [DISTWIDTH] IN BLOOD BY AUTOMATED COUNT: 13.9 % (ref 11.5–17)
GLOBULIN SER-MCNC: 3.1 GM/DL (ref 2.4–3.5)
GLUCOSE SERPL-MCNC: 79 MG/DL (ref 82–115)
HCT VFR BLD AUTO: 37.9 % (ref 37–47)
HDLC SERPL-MCNC: 77 MG/DL (ref 35–60)
HGB BLD-MCNC: 11.6 GM/DL (ref 12–16)
LDLC SERPL CALC-MCNC: 69 MG/DL (ref 50–140)
LYMPHOCYTES # BLD AUTO: 1.1 X10(3)/MCL (ref 0.6–4.6)
LYMPHOCYTES NFR BLD AUTO: 19 %
MAGNESIUM SERPL-MCNC: 1.9 MG/DL (ref 1.6–2.6)
MCH RBC QN AUTO: 27.5 PG (ref 27–31)
MCHC RBC AUTO-ENTMCNC: 30.6 GM/DL (ref 33–36)
MCV RBC AUTO: 89.8 FL (ref 80–94)
MONOCYTES # BLD AUTO: 0.5 X10(3)/MCL (ref 0.1–1.3)
MONOCYTES NFR BLD AUTO: 9 %
NEUTROPHILS # BLD AUTO: 3.96 X10(3)/MCL (ref 2.1–9.2)
NEUTROPHILS NFR BLD AUTO: 70 %
PLATELET # BLD AUTO: 218 X10(3)/MCL (ref 130–400)
PMV BLD AUTO: 11.1 FL (ref 9.4–12.4)
POTASSIUM SERPL-SCNC: 4.4 MMOL/L (ref 3.5–5.1)
PROT SERPL-MCNC: 7.3 GM/DL (ref 5.8–7.6)
RBC # BLD AUTO: 4.22 X10(6)/MCL (ref 4.2–5.4)
SODIUM SERPL-SCNC: 140 MMOL/L (ref 136–145)
TRIGL SERPL-MCNC: 49 MG/DL (ref 37–140)
VLDLC SERPL CALC-MCNC: 10 MG/DL
WBC # SPEC AUTO: 5.6 X10(3)/MCL (ref 4.5–11.5)

## 2021-01-14 ENCOUNTER — PATIENT MESSAGE (OUTPATIENT)
Dept: TRANSPLANT | Facility: CLINIC | Age: 66
End: 2021-01-14

## 2021-01-14 ENCOUNTER — TELEPHONE (OUTPATIENT)
Dept: TRANSPLANT | Facility: CLINIC | Age: 66
End: 2021-01-14

## 2021-01-14 LAB
BNP (B-TYPE NATRIURETIC PEP): 49.4
EXT ALBUMIN: 4.2
EXT ALT: 18
EXT AST: 24
EXT BUN: 15.8
EXT CALCIUM: 8.8
EXT CHLORIDE: 104
EXT CHOLESTEROL (LIPID PANEL): 156
EXT CREATININE: 0.77 MG/DL
EXT GLUCOSE: 79
EXT HDL: 77
EXT LDL CHOLESTEROL: 69
EXT MAGNESIUM: 1.9
EXT POTASSIUM: 4.4
EXT PROTEIN TOTAL: 7.3
EXT SODIUM: 140 MMOL/L
EXT TRIGLYCERIDES: 49

## 2021-01-18 ENCOUNTER — PATIENT MESSAGE (OUTPATIENT)
Dept: TRANSPLANT | Facility: CLINIC | Age: 66
End: 2021-01-18

## 2021-01-19 ENCOUNTER — TELEPHONE (OUTPATIENT)
Dept: TRANSPLANT | Facility: CLINIC | Age: 66
End: 2021-01-19

## 2021-01-19 LAB — EXT TACROLIMUS LVL: 3.7

## 2021-01-26 ENCOUNTER — PATIENT MESSAGE (OUTPATIENT)
Dept: TRANSPLANT | Facility: CLINIC | Age: 66
End: 2021-01-26

## 2021-02-01 DIAGNOSIS — I10 ESSENTIAL HYPERTENSION: ICD-10-CM

## 2021-02-02 ENCOUNTER — PATIENT MESSAGE (OUTPATIENT)
Dept: TRANSPLANT | Facility: CLINIC | Age: 66
End: 2021-02-02

## 2021-02-02 RX ORDER — LISINOPRIL 5 MG/1
7.5 TABLET ORAL DAILY
Qty: 135 TABLET | Refills: 3 | Status: SHIPPED | OUTPATIENT
Start: 2021-02-02 | End: 2021-03-25 | Stop reason: SDUPTHER

## 2021-02-26 ENCOUNTER — PATIENT MESSAGE (OUTPATIENT)
Dept: TRANSPLANT | Facility: CLINIC | Age: 66
End: 2021-02-26

## 2021-03-10 DIAGNOSIS — E78.5 HYPERLIPIDEMIA, UNSPECIFIED HYPERLIPIDEMIA TYPE: ICD-10-CM

## 2021-03-10 RX ORDER — ATORVASTATIN CALCIUM 40 MG/1
40 TABLET, FILM COATED ORAL DAILY
Qty: 30 TABLET | Refills: 11 | Status: SHIPPED | OUTPATIENT
Start: 2021-03-10 | End: 2022-03-03 | Stop reason: SDUPTHER

## 2021-03-11 ENCOUNTER — PATIENT MESSAGE (OUTPATIENT)
Dept: TRANSPLANT | Facility: CLINIC | Age: 66
End: 2021-03-11

## 2021-03-22 ENCOUNTER — PATIENT MESSAGE (OUTPATIENT)
Dept: TRANSPLANT | Facility: CLINIC | Age: 66
End: 2021-03-22

## 2021-03-23 DIAGNOSIS — Z01.818 PRE-OP TESTING: ICD-10-CM

## 2021-03-25 ENCOUNTER — TELEPHONE (OUTPATIENT)
Dept: TRANSPLANT | Facility: CLINIC | Age: 66
End: 2021-03-25

## 2021-03-25 ENCOUNTER — DOCUMENTATION ONLY (OUTPATIENT)
Dept: CARDIOLOGY | Facility: CLINIC | Age: 66
End: 2021-03-25

## 2021-03-25 ENCOUNTER — HOSPITAL ENCOUNTER (OUTPATIENT)
Dept: CARDIOLOGY | Facility: HOSPITAL | Age: 66
Discharge: HOME | End: 2021-03-25
Attending: INTERNAL MEDICINE
Payer: COMMERCIAL

## 2021-03-25 ENCOUNTER — HOSPITAL ENCOUNTER (OUTPATIENT)
Dept: CARDIOLOGY | Facility: CLINIC | Age: 66
Discharge: HOME | End: 2021-03-25
Payer: COMMERCIAL

## 2021-03-25 ENCOUNTER — OFFICE VISIT (OUTPATIENT)
Dept: TRANSPLANT | Facility: CLINIC | Age: 66
End: 2021-03-25
Payer: COMMERCIAL

## 2021-03-25 ENCOUNTER — OFFICE VISIT (OUTPATIENT)
Dept: CARDIOLOGY | Facility: CLINIC | Age: 66
End: 2021-03-25
Payer: COMMERCIAL

## 2021-03-25 VITALS
DIASTOLIC BLOOD PRESSURE: 80 MMHG | BODY MASS INDEX: 23.04 KG/M2 | HEIGHT: 63 IN | SYSTOLIC BLOOD PRESSURE: 140 MMHG | HEART RATE: 70 BPM | WEIGHT: 130 LBS

## 2021-03-25 VITALS
HEIGHT: 63 IN | SYSTOLIC BLOOD PRESSURE: 166 MMHG | DIASTOLIC BLOOD PRESSURE: 92 MMHG | BODY MASS INDEX: 23.12 KG/M2 | WEIGHT: 130.5 LBS | HEART RATE: 81 BPM

## 2021-03-25 VITALS
BODY MASS INDEX: 23.09 KG/M2 | OXYGEN SATURATION: 99 % | DIASTOLIC BLOOD PRESSURE: 84 MMHG | HEIGHT: 63 IN | HEART RATE: 88 BPM | WEIGHT: 130.31 LBS | SYSTOLIC BLOOD PRESSURE: 161 MMHG

## 2021-03-25 DIAGNOSIS — E78.5 HYPERLIPIDEMIA LDL GOAL <70: ICD-10-CM

## 2021-03-25 DIAGNOSIS — T86.290 VASCULOPATHY OF CARDIAC ALLOGRAFT: ICD-10-CM

## 2021-03-25 DIAGNOSIS — T86.20 COMPLICATION OF HEART TRANSPLANT, UNSPECIFIED COMPLICATION: ICD-10-CM

## 2021-03-25 DIAGNOSIS — Z79.52 LONG TERM CURRENT USE OF SYSTEMIC STEROIDS: ICD-10-CM

## 2021-03-25 DIAGNOSIS — T86.290 CARDIAC ALLOGRAFT VASCULOPATHY: ICD-10-CM

## 2021-03-25 DIAGNOSIS — Z79.899 ENCOUNTER FOR LONG-TERM (CURRENT) USE OF MEDICATIONS: ICD-10-CM

## 2021-03-25 DIAGNOSIS — I10 ESSENTIAL HYPERTENSION: ICD-10-CM

## 2021-03-25 DIAGNOSIS — Z94.1 STATUS POST HEART TRANSPLANT: ICD-10-CM

## 2021-03-25 DIAGNOSIS — E78.2 MIXED HYPERLIPIDEMIA: ICD-10-CM

## 2021-03-25 DIAGNOSIS — Z79.60 LONG-TERM USE OF IMMUNOSUPPRESSANT MEDICATION: ICD-10-CM

## 2021-03-25 DIAGNOSIS — Z94.1 HEART TRANSPLANTED: Primary | ICD-10-CM

## 2021-03-25 DIAGNOSIS — R06.02 SHORTNESS OF BREATH: ICD-10-CM

## 2021-03-25 DIAGNOSIS — Z94.1 S/P ORTHOTOPIC HEART TRANSPLANT: Primary | ICD-10-CM

## 2021-03-25 DIAGNOSIS — Z94.1 H/O HEART TRANSPLANT: ICD-10-CM

## 2021-03-25 LAB
ASCENDING AORTA: 3.29 CM
AV INDEX (PROSTH): 1.05
AV MEAN GRADIENT: 3 MMHG
AV PEAK GRADIENT: 5 MMHG
AV VALVE AREA: 4.33 CM2
AV VELOCITY RATIO: 0.9
BSA FOR ECHO PROCEDURE: 1.62 M2
CV ECHO LV RWT: 0.45 CM
DOP CALC AO PEAK VEL: 1.12 M/S
DOP CALC AO VTI: 22.48 CM
DOP CALC LVOT AREA: 4.1 CM2
DOP CALC LVOT DIAMETER: 2.29 CM
DOP CALC LVOT PEAK VEL: 1.01 M/S
DOP CALC LVOT STROKE VOLUME: 97.28 CM3
DOP CALCLVOT PEAK VEL VTI: 23.63 CM
E WAVE DECELERATION TIME: 149.05 MSEC
E/A RATIO: 1.94
E/E' RATIO: 9.79 M/S
ECHO LV POSTERIOR WALL: 0.97 CM (ref 0.6–1.1)
FRACTIONAL SHORTENING: 35 % (ref 28–44)
INTERVENTRICULAR SEPTUM: 1.01 CM (ref 0.6–1.1)
IVRT: 91.34 MSEC
LEFT INTERNAL DIMENSION IN SYSTOLE: 2.8 CM (ref 2.1–4)
LEFT VENTRICLE DIASTOLIC VOLUME INDEX: 32.84 ML/M2
LEFT VENTRICLE DIASTOLIC VOLUME: 52.88 ML
LEFT VENTRICLE MASS INDEX: 87 G/M2
LEFT VENTRICLE SYSTOLIC VOLUME INDEX: 11 ML/M2
LEFT VENTRICLE SYSTOLIC VOLUME: 17.73 ML
LEFT VENTRICULAR INTERNAL DIMENSION IN DIASTOLE: 4.3 CM (ref 3.5–6)
LEFT VENTRICULAR MASS: 140.51 G
LV LATERAL E/E' RATIO: 8.45 M/S
LV SEPTAL E/E' RATIO: 11.63 M/S
MV A" WAVE DURATION": 18.55 MSEC
MV PEAK A VEL: 0.48 M/S
MV PEAK E VEL: 0.93 M/S
MV STENOSIS PRESSURE HALF TIME: 43.22 MS
MV VALVE AREA P 1/2 METHOD: 5.09 CM2
PISA TR MAX VEL: 2.52 M/S
PULM VEIN S/D RATIO: 0.45
PV PEAK D VEL: 0.88 M/S
PV PEAK S VEL: 0.4 M/S
RA PRESSURE: 3 MMHG
RIGHT VENTRICULAR END-DIASTOLIC DIMENSION: 3.33 CM
RV TISSUE DOPPLER FREE WALL SYSTOLIC VELOCITY 1 (APICAL 4 CHAMBER VIEW): 6.67 CM/S
SINUS: 3.5 CM
STJ: 3.17 CM
TDI LATERAL: 0.11 M/S
TDI SEPTAL: 0.08 M/S
TDI: 0.1 M/S
TR MAX PG: 25 MMHG
TRICUSPID ANNULAR PLANE SYSTOLIC EXCURSION: 1.3 CM
TV REST PULMONARY ARTERY PRESSURE: 28 MMHG

## 2021-03-25 PROCEDURE — 1126F AMNT PAIN NOTED NONE PRSNT: CPT | Mod: S$GLB,,, | Performed by: INTERNAL MEDICINE

## 2021-03-25 PROCEDURE — 3008F PR BODY MASS INDEX (BMI) DOCUMENTED: ICD-10-PCS | Mod: CPTII,S$GLB,, | Performed by: INTERNAL MEDICINE

## 2021-03-25 PROCEDURE — 99999 PR PBB SHADOW E&M-EST. PATIENT-LVL III: CPT | Mod: PBBFAC,,, | Performed by: INTERNAL MEDICINE

## 2021-03-25 PROCEDURE — 93306 TTE W/DOPPLER COMPLETE: CPT | Mod: 26,,, | Performed by: INTERNAL MEDICINE

## 2021-03-25 PROCEDURE — 3077F PR MOST RECENT SYSTOLIC BLOOD PRESSURE >= 140 MM HG: ICD-10-PCS | Mod: CPTII,S$GLB,, | Performed by: INTERNAL MEDICINE

## 2021-03-25 PROCEDURE — 99999 PR PBB SHADOW E&M-EST. PATIENT-LVL IV: CPT | Mod: PBBFAC,,, | Performed by: INTERNAL MEDICINE

## 2021-03-25 PROCEDURE — 3079F PR MOST RECENT DIASTOLIC BLOOD PRESSURE 80-89 MM HG: ICD-10-PCS | Mod: CPTII,S$GLB,, | Performed by: INTERNAL MEDICINE

## 2021-03-25 PROCEDURE — 3079F DIAST BP 80-89 MM HG: CPT | Mod: CPTII,S$GLB,, | Performed by: INTERNAL MEDICINE

## 2021-03-25 PROCEDURE — 3077F SYST BP >= 140 MM HG: CPT | Mod: CPTII,S$GLB,, | Performed by: INTERNAL MEDICINE

## 2021-03-25 PROCEDURE — 3078F PR MOST RECENT DIASTOLIC BLOOD PRESSURE < 80 MM HG: ICD-10-PCS | Mod: CPTII,S$GLB,, | Performed by: INTERNAL MEDICINE

## 2021-03-25 PROCEDURE — 1126F PR PAIN SEVERITY QUANTIFIED, NO PAIN PRESENT: ICD-10-PCS | Mod: S$GLB,,, | Performed by: INTERNAL MEDICINE

## 2021-03-25 PROCEDURE — 93010 ELECTROCARDIOGRAM REPORT: CPT | Mod: S$GLB,,, | Performed by: INTERNAL MEDICINE

## 2021-03-25 PROCEDURE — 93306 TTE W/DOPPLER COMPLETE: CPT

## 2021-03-25 PROCEDURE — 3008F BODY MASS INDEX DOCD: CPT | Mod: CPTII,S$GLB,, | Performed by: INTERNAL MEDICINE

## 2021-03-25 PROCEDURE — 3078F DIAST BP <80 MM HG: CPT | Mod: CPTII,S$GLB,, | Performed by: INTERNAL MEDICINE

## 2021-03-25 PROCEDURE — 99213 OFFICE O/P EST LOW 20 MIN: CPT | Mod: S$GLB,,, | Performed by: INTERNAL MEDICINE

## 2021-03-25 PROCEDURE — 99213 PR OFFICE/OUTPT VISIT, EST, LEVL III, 20-29 MIN: ICD-10-PCS | Mod: S$GLB,,, | Performed by: INTERNAL MEDICINE

## 2021-03-25 PROCEDURE — 93306 ECHO (CUPID ONLY): ICD-10-PCS | Mod: 26,,, | Performed by: INTERNAL MEDICINE

## 2021-03-25 PROCEDURE — 93005 EKG 12-LEAD: ICD-10-PCS | Mod: S$GLB,,, | Performed by: INTERNAL MEDICINE

## 2021-03-25 PROCEDURE — 93005 ELECTROCARDIOGRAM TRACING: CPT | Mod: S$GLB,,, | Performed by: INTERNAL MEDICINE

## 2021-03-25 PROCEDURE — 99214 PR OFFICE/OUTPT VISIT, EST, LEVL IV, 30-39 MIN: ICD-10-PCS | Mod: S$GLB,,, | Performed by: INTERNAL MEDICINE

## 2021-03-25 PROCEDURE — 93010 EKG 12-LEAD: ICD-10-PCS | Mod: S$GLB,,, | Performed by: INTERNAL MEDICINE

## 2021-03-25 PROCEDURE — 99999 PR PBB SHADOW E&M-EST. PATIENT-LVL III: ICD-10-PCS | Mod: PBBFAC,,, | Performed by: INTERNAL MEDICINE

## 2021-03-25 PROCEDURE — 99999 PR PBB SHADOW E&M-EST. PATIENT-LVL IV: ICD-10-PCS | Mod: PBBFAC,,, | Performed by: INTERNAL MEDICINE

## 2021-03-25 PROCEDURE — 99214 OFFICE O/P EST MOD 30 MIN: CPT | Mod: S$GLB,,, | Performed by: INTERNAL MEDICINE

## 2021-03-25 RX ORDER — LISINOPRIL 5 MG/1
10 TABLET ORAL DAILY
Qty: 135 TABLET | Refills: 3 | Status: SHIPPED | OUTPATIENT
Start: 2021-03-25 | End: 2021-10-25

## 2021-03-25 RX ORDER — SODIUM CHLORIDE 9 MG/ML
INJECTION, SOLUTION INTRAVENOUS CONTINUOUS
Status: CANCELLED | OUTPATIENT
Start: 2021-03-25 | End: 2021-03-25

## 2021-03-25 RX ORDER — DIPHENHYDRAMINE HCL 50 MG
50 CAPSULE ORAL ONCE
Status: CANCELLED | OUTPATIENT
Start: 2021-03-25 | End: 2021-03-25

## 2021-04-05 ENCOUNTER — TELEPHONE (OUTPATIENT)
Dept: TRANSPLANT | Facility: CLINIC | Age: 66
End: 2021-04-05

## 2021-04-05 ENCOUNTER — PATIENT MESSAGE (OUTPATIENT)
Dept: TRANSPLANT | Facility: CLINIC | Age: 66
End: 2021-04-05

## 2021-04-07 ENCOUNTER — HISTORICAL (OUTPATIENT)
Dept: ADMINISTRATIVE | Facility: HOSPITAL | Age: 66
End: 2021-04-07

## 2021-04-07 LAB
ABS NEUT (OLG): 1.76 X10(3)/MCL (ref 2.1–9.2)
ALBUMIN SERPL-MCNC: 3.9 GM/DL (ref 3.4–4.8)
ALBUMIN/GLOB SERPL: 1.3 RATIO (ref 1.1–2)
ALP SERPL-CCNC: 56 UNIT/L (ref 40–150)
ALT SERPL-CCNC: 17 UNIT/L (ref 0–55)
AST SERPL-CCNC: 26 UNIT/L (ref 5–34)
BASOPHILS # BLD AUTO: 0 X10(3)/MCL (ref 0–0.2)
BASOPHILS NFR BLD AUTO: 0 %
BILIRUB SERPL-MCNC: 0.6 MG/DL
BILIRUBIN DIRECT+TOT PNL SERPL-MCNC: 0.3 MG/DL (ref 0–0.5)
BILIRUBIN DIRECT+TOT PNL SERPL-MCNC: 0.3 MG/DL (ref 0–0.8)
BNP BLD-MCNC: 35.3 PG/ML
BUN SERPL-MCNC: 16.9 MG/DL (ref 9.8–20.1)
CALCIUM SERPL-MCNC: 9.4 MG/DL (ref 8.4–10.2)
CHLORIDE SERPL-SCNC: 104 MMOL/L (ref 98–107)
CHOLEST SERPL-MCNC: 171 MG/DL
CHOLEST/HDLC SERPL: 3 {RATIO} (ref 0–5)
CO2 SERPL-SCNC: 28 MMOL/L (ref 23–31)
CREAT SERPL-MCNC: 0.83 MG/DL (ref 0.55–1.02)
EOSINOPHIL # BLD AUTO: 0 X10(3)/MCL (ref 0–0.9)
EOSINOPHIL NFR BLD AUTO: 1 %
ERYTHROCYTE [DISTWIDTH] IN BLOOD BY AUTOMATED COUNT: 13.7 % (ref 11.5–17)
GLOBULIN SER-MCNC: 2.9 GM/DL (ref 2.4–3.5)
GLUCOSE SERPL-MCNC: 85 MG/DL (ref 82–115)
HCT VFR BLD AUTO: 36.7 % (ref 37–47)
HDLC SERPL-MCNC: 64 MG/DL (ref 35–60)
HGB BLD-MCNC: 11.2 GM/DL (ref 12–16)
LDLC SERPL CALC-MCNC: 89 MG/DL (ref 50–140)
LYMPHOCYTES # BLD AUTO: 1.1 X10(3)/MCL (ref 0.6–4.6)
LYMPHOCYTES NFR BLD AUTO: 32 %
MAGNESIUM SERPL-MCNC: 1.8 MG/DL (ref 1.6–2.6)
MCH RBC QN AUTO: 27.5 PG (ref 27–31)
MCHC RBC AUTO-ENTMCNC: 30.5 GM/DL (ref 33–36)
MCV RBC AUTO: 90 FL (ref 80–94)
MONOCYTES # BLD AUTO: 0.4 X10(3)/MCL (ref 0.1–1.3)
MONOCYTES NFR BLD AUTO: 13 %
NEUTROPHILS # BLD AUTO: 1.76 X10(3)/MCL (ref 2.1–9.2)
NEUTROPHILS NFR BLD AUTO: 53 %
PLATELET # BLD AUTO: 205 X10(3)/MCL (ref 130–400)
PMV BLD AUTO: 10.2 FL (ref 9.4–12.4)
POTASSIUM SERPL-SCNC: 5 MMOL/L (ref 3.5–5.1)
PROT SERPL-MCNC: 6.8 GM/DL (ref 5.8–7.6)
RBC # BLD AUTO: 4.08 X10(6)/MCL (ref 4.2–5.4)
SODIUM SERPL-SCNC: 140 MMOL/L (ref 136–145)
TRIGL SERPL-MCNC: 88 MG/DL (ref 37–140)
VLDLC SERPL CALC-MCNC: 18 MG/DL
WBC # SPEC AUTO: 3.3 X10(3)/MCL (ref 4.5–11.5)

## 2021-04-08 ENCOUNTER — PATIENT MESSAGE (OUTPATIENT)
Dept: TRANSPLANT | Facility: CLINIC | Age: 66
End: 2021-04-08

## 2021-04-09 ENCOUNTER — TELEPHONE (OUTPATIENT)
Dept: TRANSPLANT | Facility: CLINIC | Age: 66
End: 2021-04-09

## 2021-04-09 ENCOUNTER — PATIENT MESSAGE (OUTPATIENT)
Dept: TRANSPLANT | Facility: CLINIC | Age: 66
End: 2021-04-09

## 2021-04-09 LAB
BNP (B-TYPE NATRIURETIC PEP): 35
EXT ALBUMIN: 3.9
EXT ALT: 17
EXT AST: 26
EXT BUN: 16.9
EXT CALCIUM: 9.4
EXT CHLORIDE: 104
EXT CHOLESTEROL (LIPID PANEL): 171
EXT CREATININE: 0.83 MG/DL
EXT GLUCOSE: 85
EXT HDL: 64
EXT HEMATOCRIT: 36.7
EXT HEMOGLOBIN: 11.2
EXT LDL CHOLESTEROL: 89
EXT MAGNESIUM: 1.8
EXT PLATELETS: 205
EXT POTASSIUM: 5
EXT PROTEIN TOTAL: 6.8
EXT SODIUM: 140 MMOL/L
EXT TRIGLYCERIDES: 88
EXT WBC: 3.3

## 2021-04-14 ENCOUNTER — TELEPHONE (OUTPATIENT)
Dept: TRANSPLANT | Facility: CLINIC | Age: 66
End: 2021-04-14

## 2021-04-14 LAB — EXT TACROLIMUS LVL: 5.3

## 2021-04-22 ENCOUNTER — PATIENT MESSAGE (OUTPATIENT)
Dept: CARDIOLOGY | Facility: CLINIC | Age: 66
End: 2021-04-22

## 2021-04-23 ENCOUNTER — HOSPITAL ENCOUNTER (OUTPATIENT)
Facility: HOSPITAL | Age: 66
Discharge: HOME | End: 2021-04-23
Attending: INTERNAL MEDICINE | Admitting: INTERNAL MEDICINE
Payer: COMMERCIAL

## 2021-04-23 VITALS
OXYGEN SATURATION: 100 % | RESPIRATION RATE: 18 BRPM | BODY MASS INDEX: 22.68 KG/M2 | TEMPERATURE: 98 F | HEIGHT: 63 IN | SYSTOLIC BLOOD PRESSURE: 133 MMHG | WEIGHT: 128 LBS | HEART RATE: 76 BPM | DIASTOLIC BLOOD PRESSURE: 75 MMHG

## 2021-04-23 DIAGNOSIS — Z94.1 HEART TRANSPLANTED: ICD-10-CM

## 2021-04-23 LAB
ABO + RH BLD: NORMAL
ANION GAP SERPL CALC-SCNC: 7 MMOL/L (ref 8–16)
BASOPHILS # BLD AUTO: 0.02 K/UL (ref 0–0.2)
BASOPHILS NFR BLD: 0.4 % (ref 0–1.9)
BLD GP AB SCN CELLS X3 SERPL QL: NORMAL
BUN SERPL-MCNC: 16 MG/DL (ref 8–23)
CALCIUM SERPL-MCNC: 8.8 MG/DL (ref 8.7–10.5)
CHLORIDE SERPL-SCNC: 107 MMOL/L (ref 95–110)
CO2 SERPL-SCNC: 26 MMOL/L (ref 23–29)
CREAT SERPL-MCNC: 0.8 MG/DL (ref 0.5–1.4)
DIFFERENTIAL METHOD: ABNORMAL
EOSINOPHIL # BLD AUTO: 0 K/UL (ref 0–0.5)
EOSINOPHIL NFR BLD: 0.8 % (ref 0–8)
ERYTHROCYTE [DISTWIDTH] IN BLOOD BY AUTOMATED COUNT: 13.7 % (ref 11.5–14.5)
EST. GFR  (AFRICAN AMERICAN): >60 ML/MIN/1.73 M^2
EST. GFR  (NON AFRICAN AMERICAN): >60 ML/MIN/1.73 M^2
GLUCOSE SERPL-MCNC: 86 MG/DL (ref 70–110)
HCT VFR BLD AUTO: 34.8 % (ref 37–48.5)
HGB BLD-MCNC: 10.9 G/DL (ref 12–16)
IMM GRANULOCYTES # BLD AUTO: 0.01 K/UL (ref 0–0.04)
IMM GRANULOCYTES NFR BLD AUTO: 0.2 % (ref 0–0.5)
LYMPHOCYTES # BLD AUTO: 1.6 K/UL (ref 1–4.8)
LYMPHOCYTES NFR BLD: 32.8 % (ref 18–48)
MCH RBC QN AUTO: 27.6 PG (ref 27–31)
MCHC RBC AUTO-ENTMCNC: 31.3 G/DL (ref 32–36)
MCV RBC AUTO: 88 FL (ref 82–98)
MONOCYTES # BLD AUTO: 0.5 K/UL (ref 0.3–1)
MONOCYTES NFR BLD: 10.1 % (ref 4–15)
NEUTROPHILS # BLD AUTO: 2.7 K/UL (ref 1.8–7.7)
NEUTROPHILS NFR BLD: 55.7 % (ref 38–73)
NRBC BLD-RTO: 0 /100 WBC
PLATELET # BLD AUTO: 236 K/UL (ref 150–450)
PMV BLD AUTO: 10 FL (ref 9.2–12.9)
POTASSIUM SERPL-SCNC: 3.8 MMOL/L (ref 3.5–5.1)
RBC # BLD AUTO: 3.95 M/UL (ref 4–5.4)
SODIUM SERPL-SCNC: 140 MMOL/L (ref 136–145)
WBC # BLD AUTO: 4.85 K/UL (ref 3.9–12.7)

## 2021-04-23 PROCEDURE — 92978 ENDOLUMINL IVUS OCT C 1ST: CPT | Mod: 26,,, | Performed by: INTERNAL MEDICINE

## 2021-04-23 PROCEDURE — 25000003 PHARM REV CODE 250: Performed by: INTERNAL MEDICINE

## 2021-04-23 PROCEDURE — 63600175 PHARM REV CODE 636 W HCPCS: Performed by: INTERNAL MEDICINE

## 2021-04-23 PROCEDURE — C1769 GUIDE WIRE: HCPCS | Performed by: INTERNAL MEDICINE

## 2021-04-23 PROCEDURE — 93458 L HRT ARTERY/VENTRICLE ANGIO: CPT | Mod: 26,,, | Performed by: INTERNAL MEDICINE

## 2021-04-23 PROCEDURE — 93458 L HRT ARTERY/VENTRICLE ANGIO: CPT | Performed by: INTERNAL MEDICINE

## 2021-04-23 PROCEDURE — 92978 ENDOLUMINL IVUS OCT C 1ST: CPT | Performed by: INTERNAL MEDICINE

## 2021-04-23 PROCEDURE — 85025 COMPLETE CBC W/AUTO DIFF WBC: CPT | Performed by: INTERNAL MEDICINE

## 2021-04-23 PROCEDURE — 99153 MOD SED SAME PHYS/QHP EA: CPT | Performed by: INTERNAL MEDICINE

## 2021-04-23 PROCEDURE — 85347 COAGULATION TIME ACTIVATED: CPT | Performed by: INTERNAL MEDICINE

## 2021-04-23 PROCEDURE — 92978 PR IVUS, CORONARY, 1ST VESSEL: ICD-10-PCS | Mod: 26,,, | Performed by: INTERNAL MEDICINE

## 2021-04-23 PROCEDURE — 80048 BASIC METABOLIC PNL TOTAL CA: CPT | Performed by: INTERNAL MEDICINE

## 2021-04-23 PROCEDURE — C1887 CATHETER, GUIDING: HCPCS | Performed by: INTERNAL MEDICINE

## 2021-04-23 PROCEDURE — 93458 PR CATH PLACE/CORON ANGIO, IMG SUPER/INTERP,W LEFT HEART VENTRICULOGRAPHY: ICD-10-PCS | Mod: 26,,, | Performed by: INTERNAL MEDICINE

## 2021-04-23 PROCEDURE — 86900 BLOOD TYPING SEROLOGIC ABO: CPT | Performed by: INTERNAL MEDICINE

## 2021-04-23 PROCEDURE — C1753 CATH, INTRAVAS ULTRASOUND: HCPCS | Performed by: INTERNAL MEDICINE

## 2021-04-23 PROCEDURE — C1894 INTRO/SHEATH, NON-LASER: HCPCS | Performed by: INTERNAL MEDICINE

## 2021-04-23 PROCEDURE — 99152 MOD SED SAME PHYS/QHP 5/>YRS: CPT | Mod: ,,, | Performed by: INTERNAL MEDICINE

## 2021-04-23 PROCEDURE — 99152 MOD SED SAME PHYS/QHP 5/>YRS: CPT | Performed by: INTERNAL MEDICINE

## 2021-04-23 PROCEDURE — 99152 PR MOD CONSCIOUS SEDATION, SAME PHYS, 5+ YRS, FIRST 15 MIN: ICD-10-PCS | Mod: ,,, | Performed by: INTERNAL MEDICINE

## 2021-04-23 PROCEDURE — 25500020 PHARM REV CODE 255: Performed by: STUDENT IN AN ORGANIZED HEALTH CARE EDUCATION/TRAINING PROGRAM

## 2021-04-23 RX ORDER — SODIUM CHLORIDE 9 MG/ML
INJECTION, SOLUTION INTRAVENOUS CONTINUOUS
Status: ACTIVE | OUTPATIENT
Start: 2021-04-23 | End: 2021-04-23

## 2021-04-23 RX ORDER — FENTANYL CITRATE 50 UG/ML
INJECTION, SOLUTION INTRAMUSCULAR; INTRAVENOUS
Status: DISCONTINUED | OUTPATIENT
Start: 2021-04-23 | End: 2021-04-23 | Stop reason: HOSPADM

## 2021-04-23 RX ORDER — MIDAZOLAM HYDROCHLORIDE 1 MG/ML
INJECTION, SOLUTION INTRAMUSCULAR; INTRAVENOUS
Status: DISCONTINUED | OUTPATIENT
Start: 2021-04-23 | End: 2021-04-23 | Stop reason: HOSPADM

## 2021-04-23 RX ORDER — HEPARIN SODIUM 1000 [USP'U]/ML
INJECTION, SOLUTION INTRAVENOUS; SUBCUTANEOUS
Status: DISCONTINUED | OUTPATIENT
Start: 2021-04-23 | End: 2021-04-23 | Stop reason: HOSPADM

## 2021-04-23 RX ORDER — DIPHENHYDRAMINE HCL 50 MG
50 CAPSULE ORAL ONCE
Status: COMPLETED | OUTPATIENT
Start: 2021-04-23 | End: 2021-04-23

## 2021-04-23 RX ORDER — LIDOCAINE HYDROCHLORIDE 20 MG/ML
INJECTION, SOLUTION EPIDURAL; INFILTRATION; INTRACAUDAL; PERINEURAL
Status: DISCONTINUED | OUTPATIENT
Start: 2021-04-23 | End: 2021-04-23 | Stop reason: HOSPADM

## 2021-04-23 RX ORDER — NITROGLYCERIN 5 MG/ML
INJECTION, SOLUTION INTRAVENOUS
Status: DISCONTINUED | OUTPATIENT
Start: 2021-04-23 | End: 2021-04-23 | Stop reason: HOSPADM

## 2021-04-23 RX ORDER — ACETAMINOPHEN 325 MG/1
650 TABLET ORAL EVERY 4 HOURS PRN
Status: DISCONTINUED | OUTPATIENT
Start: 2021-04-23 | End: 2021-04-23 | Stop reason: HOSPADM

## 2021-04-23 RX ORDER — HEPARIN SOD,PORCINE/0.9 % NACL 1000/500ML
INTRAVENOUS SOLUTION INTRAVENOUS
Status: DISCONTINUED | OUTPATIENT
Start: 2021-04-23 | End: 2021-04-23 | Stop reason: HOSPADM

## 2021-04-23 RX ADMIN — DIPHENHYDRAMINE HYDROCHLORIDE 50 MG: 50 CAPSULE ORAL at 08:04

## 2021-04-23 RX ADMIN — ACETAMINOPHEN 650 MG: 325 TABLET ORAL at 02:04

## 2021-04-23 RX ADMIN — SODIUM CHLORIDE: 0.9 INJECTION, SOLUTION INTRAVENOUS at 08:04

## 2021-04-26 LAB
POC ACTIVATED CLOTTING TIME K: 186 SEC (ref 74–137)
POC ACTIVATED CLOTTING TIME K: 197 SEC (ref 74–137)
SAMPLE: ABNORMAL
SAMPLE: ABNORMAL

## 2021-04-27 LAB
POC ACTIVATED CLOTTING TIME K: 219 SEC (ref 74–137)
SAMPLE: ABNORMAL

## 2021-05-10 RX ORDER — EVEROLIMUS 0.5 MG/1
1 TABLET ORAL 2 TIMES DAILY
Qty: 360 TABLET | Refills: 3 | Status: SHIPPED | OUTPATIENT
Start: 2021-05-10 | End: 2022-03-04

## 2021-05-12 ENCOUNTER — PATIENT MESSAGE (OUTPATIENT)
Dept: TRANSPLANT | Facility: CLINIC | Age: 66
End: 2021-05-12

## 2021-05-27 ENCOUNTER — PATIENT MESSAGE (OUTPATIENT)
Dept: TRANSPLANT | Facility: CLINIC | Age: 66
End: 2021-05-27

## 2021-06-06 ENCOUNTER — PATIENT MESSAGE (OUTPATIENT)
Dept: TRANSPLANT | Facility: CLINIC | Age: 66
End: 2021-06-06

## 2021-06-29 ENCOUNTER — PATIENT MESSAGE (OUTPATIENT)
Dept: TRANSPLANT | Facility: CLINIC | Age: 66
End: 2021-06-29

## 2021-06-29 ENCOUNTER — TELEPHONE (OUTPATIENT)
Dept: TRANSPLANT | Facility: CLINIC | Age: 66
End: 2021-06-29

## 2021-07-12 ENCOUNTER — HISTORICAL (OUTPATIENT)
Dept: ADMINISTRATIVE | Facility: HOSPITAL | Age: 66
End: 2021-07-12

## 2021-07-12 LAB
ABS NEUT (OLG): 2.87 X10(3)/MCL (ref 2.1–9.2)
ALBUMIN SERPL-MCNC: 3.6 GM/DL (ref 3.4–4.8)
ALBUMIN/GLOB SERPL: 1.1 RATIO (ref 1.1–2)
ALP SERPL-CCNC: 58 UNIT/L (ref 40–150)
ALT SERPL-CCNC: 20 UNIT/L (ref 0–55)
AST SERPL-CCNC: 25 UNIT/L (ref 5–34)
BASOPHILS # BLD AUTO: 0 X10(3)/MCL (ref 0–0.2)
BASOPHILS NFR BLD AUTO: 1 %
BILIRUB SERPL-MCNC: 0.5 MG/DL
BILIRUBIN DIRECT+TOT PNL SERPL-MCNC: 0.2 MG/DL (ref 0–0.5)
BILIRUBIN DIRECT+TOT PNL SERPL-MCNC: 0.3 MG/DL (ref 0–0.8)
BNP BLD-MCNC: 45.4 PG/ML
BUN SERPL-MCNC: 15.4 MG/DL (ref 9.8–20.1)
CALCIUM SERPL-MCNC: 9.3 MG/DL (ref 8.4–10.2)
CHLORIDE SERPL-SCNC: 105 MMOL/L (ref 98–107)
CHOLEST SERPL-MCNC: 164 MG/DL
CHOLEST/HDLC SERPL: 2 {RATIO} (ref 0–5)
CO2 SERPL-SCNC: 27 MMOL/L (ref 23–31)
CREAT SERPL-MCNC: 0.84 MG/DL (ref 0.55–1.02)
EOSINOPHIL # BLD AUTO: 0 X10(3)/MCL (ref 0–0.9)
EOSINOPHIL NFR BLD AUTO: 1 %
ERYTHROCYTE [DISTWIDTH] IN BLOOD BY AUTOMATED COUNT: 13.6 % (ref 11.5–17)
GLOBULIN SER-MCNC: 3.3 GM/DL (ref 2.4–3.5)
GLUCOSE SERPL-MCNC: 85 MG/DL (ref 82–115)
HCT VFR BLD AUTO: 36.8 % (ref 37–47)
HDLC SERPL-MCNC: 74 MG/DL (ref 35–60)
HGB BLD-MCNC: 11.1 GM/DL (ref 12–16)
LDLC SERPL CALC-MCNC: 79 MG/DL (ref 50–140)
LYMPHOCYTES # BLD AUTO: 1.1 X10(3)/MCL (ref 0.6–4.6)
LYMPHOCYTES NFR BLD AUTO: 24 %
MAGNESIUM SERPL-MCNC: 1.9 MG/DL (ref 1.6–2.6)
MCH RBC QN AUTO: 26.9 PG (ref 27–31)
MCHC RBC AUTO-ENTMCNC: 30.2 GM/DL (ref 33–36)
MCV RBC AUTO: 89.3 FL (ref 80–94)
MONOCYTES # BLD AUTO: 0.5 X10(3)/MCL (ref 0.1–1.3)
MONOCYTES NFR BLD AUTO: 10 %
NEUTROPHILS # BLD AUTO: 2.87 X10(3)/MCL (ref 2.1–9.2)
NEUTROPHILS NFR BLD AUTO: 64 %
PLATELET # BLD AUTO: 220 X10(3)/MCL (ref 130–400)
PMV BLD AUTO: 10.3 FL (ref 9.4–12.4)
POTASSIUM SERPL-SCNC: 5.6 MMOL/L (ref 3.5–5.1)
PROT SERPL-MCNC: 6.9 GM/DL (ref 5.8–7.6)
RBC # BLD AUTO: 4.12 X10(6)/MCL (ref 4.2–5.4)
SODIUM SERPL-SCNC: 139 MMOL/L (ref 136–145)
TRIGL SERPL-MCNC: 56 MG/DL (ref 37–140)
VLDLC SERPL CALC-MCNC: 11 MG/DL
WBC # SPEC AUTO: 4.5 X10(3)/MCL (ref 4.5–11.5)

## 2021-07-15 ENCOUNTER — TELEPHONE (OUTPATIENT)
Dept: TRANSPLANT | Facility: CLINIC | Age: 66
End: 2021-07-15

## 2021-07-15 ENCOUNTER — PATIENT MESSAGE (OUTPATIENT)
Dept: TRANSPLANT | Facility: CLINIC | Age: 66
End: 2021-07-15

## 2021-07-15 LAB
BNP (B-TYPE NATRIURETIC PEP): 45.4
EXT ALBUMIN: 3.6
EXT ALT: 20
EXT AST: 25
EXT BUN: 15.4
EXT CALCIUM: 9.3
EXT CHLORIDE: 105
EXT CHOLESTEROL (LIPID PANEL): 164
EXT CREATININE: 0.84 MG/DL
EXT GLUCOSE: 85
EXT HDL: 74
EXT HEMATOCRIT: 36.8
EXT HEMOGLOBIN: 11.1
EXT LDL CHOLESTEROL: 79
EXT MAGNESIUM: 1.9
EXT PLATELETS: 220
EXT POTASSIUM: 5.6 (ref 3.5–5.1)
EXT PROTEIN TOTAL: 6.9
EXT SODIUM: 139 MMOL/L
EXT TACROLIMUS LVL: 4.1
EXT TRIGLYCERIDES: 56
EXT WBC: 4.5

## 2021-07-19 ENCOUNTER — HISTORICAL (OUTPATIENT)
Dept: ADMINISTRATIVE | Facility: HOSPITAL | Age: 66
End: 2021-07-19

## 2021-07-19 LAB
ALBUMIN SERPL-MCNC: 3.5 GM/DL (ref 3.4–4.8)
ALBUMIN/GLOB SERPL: 1 RATIO (ref 1.1–2)
ALP SERPL-CCNC: 60 UNIT/L (ref 40–150)
ALT SERPL-CCNC: 20 UNIT/L (ref 0–55)
AST SERPL-CCNC: 26 UNIT/L (ref 5–34)
BILIRUB SERPL-MCNC: 0.2 MG/DL
BILIRUBIN DIRECT+TOT PNL SERPL-MCNC: 0.1 MG/DL (ref 0–0.5)
BILIRUBIN DIRECT+TOT PNL SERPL-MCNC: 0.1 MG/DL (ref 0–0.8)
BNP BLD-MCNC: 19.7 PG/ML
BUN SERPL-MCNC: 19.4 MG/DL (ref 9.8–20.1)
CALCIUM SERPL-MCNC: 9.1 MG/DL (ref 8.4–10.2)
CHLORIDE SERPL-SCNC: 107 MMOL/L (ref 98–107)
CHOLEST SERPL-MCNC: 161 MG/DL
CHOLEST/HDLC SERPL: 2 {RATIO} (ref 0–5)
CO2 SERPL-SCNC: 25 MMOL/L (ref 23–31)
CREAT SERPL-MCNC: 0.78 MG/DL (ref 0.55–1.02)
ERYTHROCYTE [DISTWIDTH] IN BLOOD BY AUTOMATED COUNT: 13.6 % (ref 11.5–17)
GLOBULIN SER-MCNC: 3.5 GM/DL (ref 2.4–3.5)
GLUCOSE SERPL-MCNC: 86 MG/DL (ref 82–115)
HCT VFR BLD AUTO: 38.4 % (ref 37–47)
HDLC SERPL-MCNC: 68 MG/DL (ref 35–60)
HGB BLD-MCNC: 11.6 GM/DL (ref 12–16)
LDLC SERPL CALC-MCNC: 75 MG/DL (ref 50–140)
MAGNESIUM SERPL-MCNC: 1.9 MG/DL (ref 1.6–2.6)
MCH RBC QN AUTO: 27.5 PG (ref 27–31)
MCHC RBC AUTO-ENTMCNC: 30.2 GM/DL (ref 33–36)
MCV RBC AUTO: 91 FL (ref 80–94)
PLATELET # BLD AUTO: 231 X10(3)/MCL (ref 130–400)
PMV BLD AUTO: 10.3 FL (ref 9.4–12.4)
POTASSIUM SERPL-SCNC: 5.2 MMOL/L (ref 3.5–5.1)
PROT SERPL-MCNC: 7 GM/DL (ref 5.8–7.6)
RBC # BLD AUTO: 4.22 X10(6)/MCL (ref 4.2–5.4)
SODIUM SERPL-SCNC: 141 MMOL/L (ref 136–145)
TRIGL SERPL-MCNC: 91 MG/DL (ref 37–140)
VLDLC SERPL CALC-MCNC: 18 MG/DL
WBC # SPEC AUTO: 6.2 X10(3)/MCL (ref 4.5–11.5)

## 2021-07-20 ENCOUNTER — PATIENT MESSAGE (OUTPATIENT)
Dept: TRANSPLANT | Facility: CLINIC | Age: 66
End: 2021-07-20

## 2021-07-20 ENCOUNTER — TELEPHONE (OUTPATIENT)
Dept: TRANSPLANT | Facility: CLINIC | Age: 66
End: 2021-07-20

## 2021-07-20 LAB
BNP (B-TYPE NATRIURETIC PEP): 19.7
EXT ALBUMIN: 3.5
EXT ALT: 20
EXT AST: 26
EXT BUN: 19.4
EXT CALCIUM: 9.1
EXT CHLORIDE: 107
EXT CHOLESTEROL (LIPID PANEL): 161
EXT CREATININE: 0.78 MG/DL
EXT GLUCOSE: 86
EXT HDL: 68
EXT HEMATOCRIT: 38.4
EXT HEMOGLOBIN: 11.6
EXT LDL CHOLESTEROL: 75
EXT MAGNESIUM: 1.9
EXT PLATELETS: 231
EXT POTASSIUM: 5.2 (ref 3.5–5.1)
EXT PROTEIN TOTAL: 7
EXT SODIUM: 141 MMOL/L
EXT TRIGLYCERIDES: 91
EXT WBC: 6.21

## 2021-07-30 ENCOUNTER — TELEPHONE (OUTPATIENT)
Dept: TRANSPLANT | Facility: CLINIC | Age: 66
End: 2021-07-30

## 2021-07-30 LAB — EXT TACROLIMUS LVL: 4.7

## 2021-08-06 DIAGNOSIS — E03.9 HYPOTHYROIDISM, UNSPECIFIED TYPE: ICD-10-CM

## 2021-08-06 DIAGNOSIS — Z94.1 STATUS POST HEART TRANSPLANT: ICD-10-CM

## 2021-08-16 ENCOUNTER — PATIENT MESSAGE (OUTPATIENT)
Dept: TRANSPLANT | Facility: CLINIC | Age: 66
End: 2021-08-16

## 2021-08-20 ENCOUNTER — HISTORICAL (OUTPATIENT)
Dept: ENDOSCOPY | Facility: HOSPITAL | Age: 66
End: 2021-08-20

## 2021-08-20 LAB — CRC RECOMMENDATION EXT: NORMAL

## 2021-09-08 LAB — BCS RECOMMENDATION EXT: NORMAL

## 2021-09-13 ENCOUNTER — PATIENT MESSAGE (OUTPATIENT)
Dept: TRANSPLANT | Facility: CLINIC | Age: 66
End: 2021-09-13

## 2021-09-24 ENCOUNTER — PATIENT MESSAGE (OUTPATIENT)
Dept: TRANSPLANT | Facility: CLINIC | Age: 66
End: 2021-09-24

## 2021-09-30 ENCOUNTER — HISTORICAL (OUTPATIENT)
Dept: ADMINISTRATIVE | Facility: HOSPITAL | Age: 66
End: 2021-09-30

## 2021-09-30 LAB
ABS NEUT (OLG): 4.15 X10(3)/MCL (ref 2.1–9.2)
ALBUMIN SERPL-MCNC: 3.8 GM/DL (ref 3.4–4.8)
ALBUMIN/GLOB SERPL: 1.2 RATIO (ref 1.1–2)
ALP SERPL-CCNC: 51 UNIT/L (ref 40–150)
ALT SERPL-CCNC: 19 UNIT/L (ref 0–55)
AST SERPL-CCNC: 24 UNIT/L (ref 5–34)
BASOPHILS # BLD AUTO: 0 X10(3)/MCL (ref 0–0.2)
BASOPHILS NFR BLD AUTO: 1 %
BILIRUB SERPL-MCNC: 0.4 MG/DL
BILIRUBIN DIRECT+TOT PNL SERPL-MCNC: 0.2 MG/DL (ref 0–0.5)
BILIRUBIN DIRECT+TOT PNL SERPL-MCNC: 0.2 MG/DL (ref 0–0.8)
BNP BLD-MCNC: 25.2 PG/ML
BUN SERPL-MCNC: 21.2 MG/DL (ref 9.8–20.1)
CALCIUM SERPL-MCNC: 9.4 MG/DL (ref 8.4–10.2)
CHLORIDE SERPL-SCNC: 104 MMOL/L (ref 98–107)
CHOLEST SERPL-MCNC: 218 MG/DL
CHOLEST/HDLC SERPL: 3 {RATIO} (ref 0–5)
CO2 SERPL-SCNC: 25 MMOL/L (ref 23–31)
CREAT SERPL-MCNC: 0.86 MG/DL (ref 0.55–1.02)
EOSINOPHIL # BLD AUTO: 0 X10(3)/MCL (ref 0–0.9)
EOSINOPHIL NFR BLD AUTO: 1 %
ERYTHROCYTE [DISTWIDTH] IN BLOOD BY AUTOMATED COUNT: 14.7 % (ref 11.5–17)
GLOBULIN SER-MCNC: 3.2 GM/DL (ref 2.4–3.5)
GLUCOSE SERPL-MCNC: 82 MG/DL (ref 82–115)
HCT VFR BLD AUTO: 37.8 % (ref 37–47)
HDLC SERPL-MCNC: 83 MG/DL (ref 35–60)
HGB BLD-MCNC: 11.8 GM/DL (ref 12–16)
LDLC SERPL CALC-MCNC: 102 MG/DL (ref 50–140)
LYMPHOCYTES # BLD AUTO: 1.2 X10(3)/MCL (ref 0.6–4.6)
LYMPHOCYTES NFR BLD AUTO: 19 %
MAGNESIUM SERPL-MCNC: 1.9 MG/DL (ref 1.6–2.6)
MCH RBC QN AUTO: 27.8 PG (ref 27–31)
MCHC RBC AUTO-ENTMCNC: 31.2 GM/DL (ref 33–36)
MCV RBC AUTO: 89.2 FL (ref 80–94)
MONOCYTES # BLD AUTO: 0.7 X10(3)/MCL (ref 0.1–1.3)
MONOCYTES NFR BLD AUTO: 12 %
NEUTROPHILS # BLD AUTO: 4.15 X10(3)/MCL (ref 2.1–9.2)
NEUTROPHILS NFR BLD AUTO: 67 %
PLATELET # BLD AUTO: 263 X10(3)/MCL (ref 130–400)
PMV BLD AUTO: 10.1 FL (ref 9.4–12.4)
POTASSIUM SERPL-SCNC: 4.8 MMOL/L (ref 3.5–5.1)
PROT SERPL-MCNC: 7 GM/DL (ref 5.8–7.6)
RBC # BLD AUTO: 4.24 X10(6)/MCL (ref 4.2–5.4)
SODIUM SERPL-SCNC: 139 MMOL/L (ref 136–145)
TRIGL SERPL-MCNC: 166 MG/DL (ref 37–140)
VLDLC SERPL CALC-MCNC: 33 MG/DL
WBC # SPEC AUTO: 6.2 X10(3)/MCL (ref 4.5–11.5)

## 2021-10-05 ENCOUNTER — TELEPHONE (OUTPATIENT)
Dept: TRANSPLANT | Facility: CLINIC | Age: 66
End: 2021-10-05

## 2021-10-05 ENCOUNTER — PATIENT MESSAGE (OUTPATIENT)
Dept: TRANSPLANT | Facility: CLINIC | Age: 66
End: 2021-10-05

## 2021-10-05 LAB
EXT ALBUMIN: 3.8
EXT ALT: 19
EXT AST: 24
EXT BUN: 21.2
EXT CALCIUM: 9.4
EXT CHLORIDE: 104
EXT CHOLESTEROL (LIPID PANEL): 218
EXT CREATININE: 0.86 MG/DL
EXT GLUCOSE: 82
EXT HDL: 83
EXT HEMATOCRIT: 37.8
EXT HEMOGLOBIN: 11.8
EXT LDL CHOLESTEROL: 102
EXT MAGNESIUM: 1.9
EXT PLATELETS: 263
EXT POTASSIUM: 4.8
EXT PROTEIN TOTAL: 7
EXT SODIUM: 139 MMOL/L
EXT TACROLIMUS LVL: 4.1
EXT TRIGLYCERIDES: 166
EXT WBC: 6.2

## 2021-10-06 RX ORDER — TACROLIMUS 1 MG/1
CAPSULE ORAL
Qty: 60 CAPSULE | Refills: 11 | Status: SHIPPED | OUTPATIENT
Start: 2021-10-06 | End: 2021-10-07

## 2021-10-07 ENCOUNTER — PATIENT MESSAGE (OUTPATIENT)
Dept: TRANSPLANT | Facility: CLINIC | Age: 66
End: 2021-10-07

## 2021-10-07 RX ORDER — TACROLIMUS 1 MG/1
1 CAPSULE ORAL EVERY 12 HOURS
Qty: 60 CAPSULE | Refills: 11 | Status: SHIPPED | OUTPATIENT
Start: 2021-10-07 | End: 2022-03-04

## 2021-10-18 ENCOUNTER — PATIENT MESSAGE (OUTPATIENT)
Dept: TRANSPLANT | Facility: CLINIC | Age: 66
End: 2021-10-18
Payer: MEDICARE

## 2021-10-25 ENCOUNTER — PATIENT MESSAGE (OUTPATIENT)
Dept: TRANSPLANT | Facility: CLINIC | Age: 66
End: 2021-10-25
Payer: MEDICARE

## 2021-11-30 ENCOUNTER — PATIENT MESSAGE (OUTPATIENT)
Dept: TRANSPLANT | Facility: CLINIC | Age: 66
End: 2021-11-30
Payer: MEDICARE

## 2021-12-04 DIAGNOSIS — I10 ESSENTIAL HYPERTENSION: ICD-10-CM

## 2021-12-10 RX ORDER — MYCOPHENOLATE MOFETIL 250 MG/1
1000 CAPSULE ORAL 2 TIMES DAILY
Qty: 720 CAPSULE | Refills: 11 | OUTPATIENT
Start: 2021-12-10 | End: 2021-12-13

## 2021-12-13 ENCOUNTER — PATIENT MESSAGE (OUTPATIENT)
Dept: TRANSPLANT | Facility: CLINIC | Age: 66
End: 2021-12-13
Payer: MEDICARE

## 2021-12-13 DIAGNOSIS — Z94.1 S/P ORTHOTOPIC HEART TRANSPLANT: Primary | ICD-10-CM

## 2021-12-13 RX ORDER — MYCOPHENOLATE MOFETIL 250 MG/1
1000 CAPSULE ORAL 2 TIMES DAILY
Qty: 720 CAPSULE | Refills: 11 | Status: SHIPPED | OUTPATIENT
Start: 2021-12-13 | End: 2021-12-13

## 2021-12-13 RX ORDER — MYCOPHENOLATE MOFETIL 250 MG/1
1000 CAPSULE ORAL 2 TIMES DAILY
Qty: 240 CAPSULE | Refills: 11 | Status: SHIPPED | OUTPATIENT
Start: 2021-12-13 | End: 2021-12-15

## 2021-12-14 ENCOUNTER — HISTORICAL (OUTPATIENT)
Dept: ADMINISTRATIVE | Facility: HOSPITAL | Age: 66
End: 2021-12-14

## 2021-12-14 LAB
ALBUMIN SERPL-MCNC: 3.8 GM/DL (ref 3.4–4.8)
ALBUMIN/GLOB SERPL: 1.3 RATIO (ref 1.1–2)
ALP SERPL-CCNC: 57 UNIT/L (ref 40–150)
ALT SERPL-CCNC: 15 UNIT/L (ref 0–55)
AST SERPL-CCNC: 24 UNIT/L (ref 5–34)
BILIRUB SERPL-MCNC: 0.5 MG/DL
BILIRUBIN DIRECT+TOT PNL SERPL-MCNC: 0.2 MG/DL (ref 0–0.5)
BILIRUBIN DIRECT+TOT PNL SERPL-MCNC: 0.3 MG/DL (ref 0–0.8)
BNP BLD-MCNC: 53.8 PG/ML
BUN SERPL-MCNC: 15.6 MG/DL (ref 9.8–20.1)
CALCIUM SERPL-MCNC: 8.9 MG/DL (ref 8.7–10.5)
CHLORIDE SERPL-SCNC: 107 MMOL/L (ref 98–107)
CHOLEST SERPL-MCNC: 157 MG/DL
CHOLEST/HDLC SERPL: 2 {RATIO} (ref 0–5)
CO2 SERPL-SCNC: 27 MMOL/L (ref 23–31)
CREAT SERPL-MCNC: 0.81 MG/DL (ref 0.55–1.02)
ERYTHROCYTE [DISTWIDTH] IN BLOOD BY AUTOMATED COUNT: 13.5 % (ref 11.5–17)
GLOBULIN SER-MCNC: 2.9 GM/DL (ref 2.4–3.5)
GLUCOSE SERPL-MCNC: 88 MG/DL (ref 82–115)
HCT VFR BLD AUTO: 35.3 % (ref 37–47)
HDLC SERPL-MCNC: 70 MG/DL (ref 35–60)
HGB BLD-MCNC: 10.8 GM/DL (ref 12–16)
LDLC SERPL CALC-MCNC: 74 MG/DL (ref 50–140)
MAGNESIUM SERPL-MCNC: 1.8 MG/DL (ref 1.6–2.6)
MCH RBC QN AUTO: 27.1 PG (ref 27–31)
MCHC RBC AUTO-ENTMCNC: 30.6 GM/DL (ref 33–36)
MCV RBC AUTO: 88.5 FL (ref 80–94)
PLATELET # BLD AUTO: 225 X10(3)/MCL (ref 130–400)
PMV BLD AUTO: 10.4 FL (ref 9.4–12.4)
POTASSIUM SERPL-SCNC: 5 MMOL/L (ref 3.5–5.1)
PROT SERPL-MCNC: 6.7 GM/DL (ref 5.8–7.6)
RBC # BLD AUTO: 3.99 X10(6)/MCL (ref 4.2–5.4)
SODIUM SERPL-SCNC: 142 MMOL/L (ref 136–145)
TRIGL SERPL-MCNC: 67 MG/DL (ref 37–140)
VLDLC SERPL CALC-MCNC: 13 MG/DL
WBC # SPEC AUTO: 3.9 X10(3)/MCL (ref 4.5–11.5)

## 2021-12-15 DIAGNOSIS — Z94.1 S/P ORTHOTOPIC HEART TRANSPLANT: ICD-10-CM

## 2021-12-15 RX ORDER — MYCOPHENOLATE MOFETIL 250 MG/1
1000 CAPSULE ORAL 2 TIMES DAILY
Qty: 240 CAPSULE | Refills: 11 | Status: SHIPPED | OUTPATIENT
Start: 2021-12-15 | End: 2022-03-04

## 2021-12-16 ENCOUNTER — PATIENT MESSAGE (OUTPATIENT)
Dept: TRANSPLANT | Facility: CLINIC | Age: 66
End: 2021-12-16
Payer: MEDICARE

## 2021-12-16 LAB
BNP (B-TYPE NATRIURETIC PEP): 53.8
EXT ALBUMIN: 3.8
EXT ALT: 15
EXT AST: 24
EXT BUN: 15.6
EXT CALCIUM: 8.9
EXT CHLORIDE: 107
EXT CHOLESTEROL (LIPID PANEL): 157
EXT CREATININE: 0.81 MG/DL
EXT GLUCOSE: 88
EXT HDL: 70
EXT HEMATOCRIT: 35.3
EXT HEMOGLOBIN: 10.8
EXT LDL CHOLESTEROL: 74
EXT MAGNESIUM: 1.8
EXT PLATELETS: 225
EXT POTASSIUM: 5
EXT PROTEIN TOTAL: 6.7
EXT SODIUM: 142 MMOL/L
EXT TACROLIMUS LVL: 3.7
EXT TRIGLYCERIDES: 67
EXT WBC: 3.9

## 2021-12-21 RX ORDER — ALENDRONATE SODIUM 70 MG/1
TABLET ORAL
Qty: 12 TABLET | Refills: 2 | Status: SHIPPED | OUTPATIENT
Start: 2021-12-21 | End: 2021-12-27

## 2021-12-21 RX ORDER — LISINOPRIL 5 MG/1
10 TABLET ORAL DAILY
Qty: 180 TABLET | Refills: 11 | Status: SHIPPED | OUTPATIENT
Start: 2021-12-21 | End: 2022-01-05 | Stop reason: SDUPTHER

## 2022-01-05 ENCOUNTER — PATIENT MESSAGE (OUTPATIENT)
Dept: TRANSPLANT | Facility: CLINIC | Age: 67
End: 2022-01-05
Payer: MEDICARE

## 2022-01-05 DIAGNOSIS — I10 ESSENTIAL HYPERTENSION: ICD-10-CM

## 2022-01-06 RX ORDER — LISINOPRIL 5 MG/1
10 TABLET ORAL DAILY
Qty: 180 TABLET | Refills: 3 | Status: SHIPPED | OUTPATIENT
Start: 2022-01-06 | End: 2022-12-29

## 2022-01-10 ENCOUNTER — TELEPHONE (OUTPATIENT)
Dept: TRANSPLANT | Facility: CLINIC | Age: 67
End: 2022-01-10
Payer: MEDICARE

## 2022-01-20 ENCOUNTER — PATIENT MESSAGE (OUTPATIENT)
Dept: TRANSPLANT | Facility: CLINIC | Age: 67
End: 2022-01-20
Payer: MEDICARE

## 2022-01-20 ENCOUNTER — TELEPHONE (OUTPATIENT)
Dept: TRANSPLANT | Facility: CLINIC | Age: 67
End: 2022-01-20
Payer: MEDICARE

## 2022-01-20 DIAGNOSIS — E78.2 MIXED HYPERLIPIDEMIA: ICD-10-CM

## 2022-01-20 DIAGNOSIS — T86.20 COMPLICATION OF HEART TRANSPLANT, UNSPECIFIED COMPLICATION: ICD-10-CM

## 2022-01-20 DIAGNOSIS — Z12.83 SKIN CANCER SCREENING: Primary | ICD-10-CM

## 2022-01-20 DIAGNOSIS — R06.02 SHORTNESS OF BREATH: ICD-10-CM

## 2022-01-20 DIAGNOSIS — Z94.1 STATUS POST HEART TRANSPLANT: ICD-10-CM

## 2022-01-20 DIAGNOSIS — Z79.899 ENCOUNTER FOR LONG-TERM (CURRENT) USE OF MEDICATIONS: ICD-10-CM

## 2022-03-02 DIAGNOSIS — E78.5 HYPERLIPIDEMIA, UNSPECIFIED HYPERLIPIDEMIA TYPE: ICD-10-CM

## 2022-03-02 RX ORDER — ATORVASTATIN CALCIUM 40 MG/1
40 TABLET, FILM COATED ORAL DAILY
Qty: 30 TABLET | Refills: 11 | Status: SHIPPED | OUTPATIENT
Start: 2022-03-02 | End: 2023-02-13

## 2022-03-04 ENCOUNTER — PATIENT MESSAGE (OUTPATIENT)
Dept: TRANSPLANT | Facility: CLINIC | Age: 67
End: 2022-03-04
Payer: MEDICARE

## 2022-03-04 DIAGNOSIS — Z94.1 S/P ORTHOTOPIC HEART TRANSPLANT: ICD-10-CM

## 2022-03-04 RX ORDER — EVEROLIMUS 0.5 MG/1
1 TABLET ORAL 2 TIMES DAILY
Qty: 360 TABLET | Refills: 3 | Status: SHIPPED | OUTPATIENT
Start: 2022-03-04 | End: 2023-02-27 | Stop reason: SDUPTHER

## 2022-03-04 RX ORDER — MYCOPHENOLATE MOFETIL 250 MG/1
1000 CAPSULE ORAL 2 TIMES DAILY
Qty: 240 CAPSULE | Refills: 11 | Status: SHIPPED | OUTPATIENT
Start: 2022-03-04 | End: 2023-02-27 | Stop reason: SDUPTHER

## 2022-03-04 RX ORDER — TACROLIMUS 1 MG/1
1 CAPSULE ORAL EVERY 12 HOURS
Qty: 60 CAPSULE | Refills: 11 | Status: SHIPPED | OUTPATIENT
Start: 2022-03-04 | End: 2023-02-27 | Stop reason: SDUPTHER

## 2022-03-17 ENCOUNTER — TELEPHONE (OUTPATIENT)
Dept: TRANSPLANT | Facility: CLINIC | Age: 67
End: 2022-03-17
Payer: MEDICARE

## 2022-03-18 ENCOUNTER — PATIENT MESSAGE (OUTPATIENT)
Dept: TRANSPLANT | Facility: CLINIC | Age: 67
End: 2022-03-18
Payer: MEDICARE

## 2022-03-18 RX ORDER — DEXAMETHASONE 0.5 MG/5ML
SOLUTION ORAL
Qty: 120 ML | Refills: 3 | Status: SHIPPED | OUTPATIENT
Start: 2022-03-18

## 2022-03-18 RX ORDER — DEXAMETHASONE 0.5 MG/5ML
SOLUTION ORAL 3 TIMES DAILY PRN
COMMUNITY
End: 2022-03-18 | Stop reason: SDUPTHER

## 2022-03-20 ENCOUNTER — PATIENT MESSAGE (OUTPATIENT)
Dept: TRANSPLANT | Facility: CLINIC | Age: 67
End: 2022-03-20
Payer: MEDICARE

## 2022-03-25 ENCOUNTER — TELEPHONE (OUTPATIENT)
Dept: TRANSPLANT | Facility: CLINIC | Age: 67
End: 2022-03-25

## 2022-03-25 ENCOUNTER — OFFICE VISIT (OUTPATIENT)
Dept: TRANSPLANT | Facility: CLINIC | Age: 67
End: 2022-03-25
Payer: MEDICARE

## 2022-03-25 ENCOUNTER — HOSPITAL ENCOUNTER (OUTPATIENT)
Dept: RADIOLOGY | Facility: HOSPITAL | Age: 67
Discharge: HOME | End: 2022-03-25
Attending: INTERNAL MEDICINE
Payer: MEDICARE

## 2022-03-25 ENCOUNTER — HOSPITAL ENCOUNTER (OUTPATIENT)
Dept: CARDIOLOGY | Facility: HOSPITAL | Age: 67
Discharge: HOME | End: 2022-03-25
Attending: INTERNAL MEDICINE
Payer: MEDICARE

## 2022-03-25 VITALS — HEIGHT: 63 IN | WEIGHT: 127 LBS | BODY MASS INDEX: 22.5 KG/M2

## 2022-03-25 VITALS
DIASTOLIC BLOOD PRESSURE: 68 MMHG | SYSTOLIC BLOOD PRESSURE: 125 MMHG | BODY MASS INDEX: 22.86 KG/M2 | HEART RATE: 101 BPM | HEIGHT: 63 IN | WEIGHT: 129 LBS

## 2022-03-25 DIAGNOSIS — Z94.1 STATUS POST HEART TRANSPLANT: ICD-10-CM

## 2022-03-25 DIAGNOSIS — E78.2 MIXED HYPERLIPIDEMIA: ICD-10-CM

## 2022-03-25 DIAGNOSIS — T86.20 COMPLICATION OF HEART TRANSPLANT, UNSPECIFIED COMPLICATION: ICD-10-CM

## 2022-03-25 DIAGNOSIS — E78.5 HYPERLIPIDEMIA LDL GOAL <70: ICD-10-CM

## 2022-03-25 DIAGNOSIS — R06.02 SHORTNESS OF BREATH: ICD-10-CM

## 2022-03-25 DIAGNOSIS — Z94.1 STATUS POST HEART TRANSPLANT: Primary | ICD-10-CM

## 2022-03-25 DIAGNOSIS — E03.9 HYPOTHYROIDISM (ACQUIRED): ICD-10-CM

## 2022-03-25 DIAGNOSIS — I10 PRIMARY HYPERTENSION: ICD-10-CM

## 2022-03-25 DIAGNOSIS — Z79.899 ENCOUNTER FOR LONG-TERM (CURRENT) USE OF MEDICATIONS: ICD-10-CM

## 2022-03-25 DIAGNOSIS — D84.9 IMMUNOSUPPRESSION: ICD-10-CM

## 2022-03-25 DIAGNOSIS — T86.290 CARDIAC ALLOGRAFT VASCULOPATHY: ICD-10-CM

## 2022-03-25 LAB
ASCENDING AORTA: 2.96 CM
BSA FOR ECHO PROCEDURE: 1.6 M2
CV ECHO LV RWT: 0.4 CM
CV STRESS BASE HR: 100 BPM
DIASTOLIC BLOOD PRESSURE: 80 MMHG
DOP CALC LVOT AREA: 4.3 CM2
DOP CALC LVOT DIAMETER: 2.34 CM
DOP CALC LVOT PEAK VEL: 1.23 M/S
DOP CALC LVOT STROKE VOLUME: 110.47 CM3
DOP CALCLVOT PEAK VEL VTI: 25.7 CM
E WAVE DECELERATION TIME: 167.38 MSEC
E/A RATIO: 1.72
E/E' RATIO: 15.64 M/S
ECHO LV POSTERIOR WALL: 0.8 CM (ref 0.6–1.1)
EJECTION FRACTION: 60 %
FRACTIONAL SHORTENING: 36 % (ref 28–44)
INTERVENTRICULAR SEPTUM: 0.9 CM (ref 0.6–1.1)
IVRT: 85.63 MSEC
LEFT INTERNAL DIMENSION IN SYSTOLE: 2.57 CM (ref 2.1–4)
LEFT VENTRICLE DIASTOLIC VOLUME INDEX: 38.64 ML/M2
LEFT VENTRICLE DIASTOLIC VOLUME: 61.43 ML
LEFT VENTRICLE MASS INDEX: 64 G/M2
LEFT VENTRICLE SYSTOLIC VOLUME INDEX: 15 ML/M2
LEFT VENTRICLE SYSTOLIC VOLUME: 23.87 ML
LEFT VENTRICULAR INTERNAL DIMENSION IN DIASTOLE: 4 CM (ref 3.5–6)
LEFT VENTRICULAR MASS: 101.43 G
LV LATERAL E/E' RATIO: 12.29 M/S
LV SEPTAL E/E' RATIO: 21.5 M/S
MV PEAK A VEL: 0.5 M/S
MV PEAK E VEL: 0.86 M/S
MV STENOSIS PRESSURE HALF TIME: 48.54 MS
MV VALVE AREA P 1/2 METHOD: 4.53 CM2
OHS CV CPX 1 MINUTE RECOVERY HEART RATE: 137 BPM
OHS CV CPX 85 PERCENT MAX PREDICTED HEART RATE MALE: 127
OHS CV CPX ESTIMATED METS: 12
OHS CV CPX MAX PREDICTED HEART RATE: 150
OHS CV CPX PATIENT IS FEMALE: 1
OHS CV CPX PATIENT IS MALE: 0
OHS CV CPX PEAK DIASTOLIC BLOOD PRESSURE: 90 MMHG
OHS CV CPX PEAK HEAR RATE: 157 BPM
OHS CV CPX PEAK RATE PRESSURE PRODUCT: NORMAL
OHS CV CPX PEAK SYSTOLIC BLOOD PRESSURE: 200 MMHG
OHS CV CPX PERCENT MAX PREDICTED HEART RATE ACHIEVED: 105
OHS CV CPX RATE PRESSURE PRODUCT PRESENTING: NORMAL
PISA TR MAX VEL: 2.56 M/S
RA PRESSURE: 3 MMHG
RIGHT VENTRICULAR END-DIASTOLIC DIMENSION: 2.28 CM
RV TISSUE DOPPLER FREE WALL SYSTOLIC VELOCITY 1 (APICAL 4 CHAMBER VIEW): 5.23 CM/S
SINUS: 3.33 CM
STJ: 2.51 CM
STRESS ECHO POST EXERCISE DUR MIN: 7 MINUTES
STRESS ECHO POST EXERCISE DUR SEC: 10 SECONDS
SYSTOLIC BLOOD PRESSURE: 139 MMHG
TDI LATERAL: 0.07 M/S
TDI SEPTAL: 0.04 M/S
TDI: 0.06 M/S
TR MAX PG: 26 MMHG
TRICUSPID ANNULAR PLANE SYSTOLIC EXCURSION: 1.49 CM
TV REST PULMONARY ARTERY PRESSURE: 29 MMHG

## 2022-03-25 PROCEDURE — 99214 PR OFFICE/OUTPT VISIT, EST, LEVL IV, 30-39 MIN: ICD-10-PCS | Mod: S$PBB,,, | Performed by: INTERNAL MEDICINE

## 2022-03-25 PROCEDURE — 93351 STRESS TTE COMPLETE: CPT

## 2022-03-25 PROCEDURE — 93351 STRESS TTE COMPLETE: CPT | Mod: 26,,, | Performed by: INTERNAL MEDICINE

## 2022-03-25 PROCEDURE — 99213 OFFICE O/P EST LOW 20 MIN: CPT | Mod: PBBFAC,25 | Performed by: INTERNAL MEDICINE

## 2022-03-25 PROCEDURE — 99214 OFFICE O/P EST MOD 30 MIN: CPT | Mod: S$PBB,,, | Performed by: INTERNAL MEDICINE

## 2022-03-25 PROCEDURE — 71046 X-RAY EXAM CHEST 2 VIEWS: CPT | Mod: 26,,, | Performed by: RADIOLOGY

## 2022-03-25 PROCEDURE — 99999 PR PBB SHADOW E&M-EST. PATIENT-LVL III: CPT | Mod: PBBFAC,,, | Performed by: INTERNAL MEDICINE

## 2022-03-25 PROCEDURE — 71046 XR CHEST PA AND LATERAL: ICD-10-PCS | Mod: 26,,, | Performed by: RADIOLOGY

## 2022-03-25 PROCEDURE — 71046 X-RAY EXAM CHEST 2 VIEWS: CPT | Mod: TC,FY

## 2022-03-25 PROCEDURE — 93351 STRESS ECHO (CUPID ONLY): ICD-10-PCS | Mod: 26,,, | Performed by: INTERNAL MEDICINE

## 2022-03-25 PROCEDURE — 99999 PR PBB SHADOW E&M-EST. PATIENT-LVL III: ICD-10-PCS | Mod: PBBFAC,,, | Performed by: INTERNAL MEDICINE

## 2022-03-25 NOTE — LETTER
March 25, 2022        Esha Tee  [address]  [place] [address]  Phone: [phone]  Fax: [phone]             [facility]  [address]  [facility] [address]  Phone: [phone]   Patient: Cassandra Gonzales   MR Number: [id number]   YOB: 1955   Date of Visit: 3/25/2022       Dear Dr. Esha Tee    Thank you for referring Cassandra Gonzales to me for evaluation. Attached you will find relevant portions of my assessment and plan of care.    If you have questions, please do not hesitate to call me. I look forward to following Cassandra Gonzales along with you.    Sincerely,    Jonathon Rainey MD    Enclosure    If you would like to receive this communication electronically, please contact [email or web address] or [phone] to request [employer] Link access.    [employer] Link is a tool which provides read-only access to select patient information with whom you have a relationship. Its easy to use and provides real time access to review your patients record including encounter summaries, notes, results, and demographic information.    If you feel you have received this communication in error or would no longer like to receive these types of communications, please e-mail [email or web address]

## 2022-03-25 NOTE — PROGRESS NOTES
Subjective:   Ms. Gonzales is a 66 y.o. year old White female who received a donation after brain death heart transplant on 3/25/16.      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 Otherwise has done really well. Today she has no complaints and doing really well from cardiac standpoint. Cr is table    .  Immuno regimen: Prograf 1/1, Cellcept 1000 mg BID, Everolimus 2/2. Off Prednisone.     Stress Echo done today  ·  The left ventricle is normal in size with normal systolic function. The estimated ejection fraction is 60%.  · Normal right ventricular size with normal right ventricular systolic function.  · Indeterminate left ventricular diastolic function.  · The estimated PA systolic pressure is 29 mmHg.  · Normal central venous pressure (3 mmHg).  · The patient's exercise capacity was above average.  · There were no arrhythmias during stress.  · The ECG portion of this study is negative for myocardial ischemia.  · The stress echo portion of this study is negative for myocardial ischemia.  · Overall, this study is negative for myocardial ischemia.    [facility] 4/23/21    · The coronary arteries were normal angiographically.  · CAV detect by IVUS (Mid LAD FELIPE=0.7mm), no significant change compared to 4/12/19 study  · The pre-procedure left ventricular end diastolic pressure was 9.  · The filling pressures on the left were normal.  · The estimated blood loss was <50 mL.       Review of Systems   Constitutional: Negative. Negative for chills, decreased appetite, diaphoresis, fever, malaise/fatigue, night sweats, weight gain and weight loss.   Eyes:  "Negative.    Cardiovascular: Negative for chest pain, claudication, cyanosis, dyspnea on exertion, irregular heartbeat, leg swelling, near-syncope, orthopnea, palpitations, paroxysmal nocturnal dyspnea and syncope.   Respiratory: Negative for cough, hemoptysis and shortness of breath.    Endocrine: Negative.    Hematologic/Lymphatic: Negative.    Skin: Negative for color change, dry skin and nail changes.   Musculoskeletal: Negative.    Gastrointestinal: Negative.    Genitourinary: Negative.    Neurological: Negative for weakness.       Objective:   Blood pressure 125/68, pulse 101, height 5' 3" (1.6 m), weight 58.5 kg (128 lb 15.5 oz), last menstrual period 03/01/2007.body mass index is 22.85 kg/m².    Physical Exam  Vitals and nursing note reviewed.   Constitutional:       Appearance: She is well-developed.   HENT:      Head: Normocephalic.   Eyes:      Pupils: Pupils are equal, round, and reactive to light.   Neck:      Vascular: No JVD.   Cardiovascular:      Rate and Rhythm: Regular rhythm. Tachycardia present.      Chest Wall: PMI is not displaced.      Pulses: Intact distal pulses.      Heart sounds: Normal heart sounds. No murmur heard.    No friction rub. No gallop.   Pulmonary:      Effort: Pulmonary effort is normal.      Breath sounds: Normal breath sounds. No wheezing or rales.   Abdominal:      General: Bowel sounds are normal.      Palpations: Abdomen is soft.   Musculoskeletal:      Cervical back: Neck supple.   Neurological:      Mental Status: She is alert and oriented to person, place, and time.         Lab Results   Component Value Date    WBC 4.29 03/25/2022    HGB 11.3 (L) 03/25/2022    HCT 37.3 03/25/2022    MCV 88 03/25/2022     03/25/2022    CO2 22 (L) 03/25/2022    CREATININE 1.0 03/25/2022    CALCIUM 9.5 03/25/2022    ALBUMIN 4.0 03/25/2022    AST 27 03/25/2022    BNP 32 03/25/2022    ALT 18 03/25/2022    ALLOMAP See result image under hyperlink 03/25/2021       Lab Results "   Component Value Date    INR 1.0 03/25/2021    INR 1.0 10/27/2016    INR 1.0 10/26/2016       BNP   Date Value Ref Range Status   03/25/2022 32 0 - 99 pg/mL Final     Comment:     Values of less than 100 pg/ml are consistent with non-CHF populations.   03/25/2021 42 0 - 99 pg/mL Final     Comment:     Values of less than 100 pg/ml are consistent with non-CHF populations.   07/30/2020 42 0 - 99 pg/mL Final     Comment:     Values of less than 100 pg/ml are consistent with non-CHF populations.       No results found for: LDH    Tacrolimus Lvl   Date Value Ref Range Status   03/25/2022 3.9 (L) 5.0 - 15.0 ng/mL Final     Comment:     Testing performed by a chemiluminescent microparticle   immunoassay on the e27 System.         Assessment:     1. Status post heart transplant    2. Cardiac allograft vasculopathy    3. Hyperlipidemia LDL goal <70    4. Primary hypertension    5. Immunosuppression    6. Hypothyroidism (acquired)        Plan:   Doing well.  Continue current immuno regimen, everolimus level pending today    Return instructions as set forth by post transplant schedule or as needed:    Clinic: Return for labs and/or biopsy weekly the first month, every two weeks during month 2 and then monthly for the first year at the provider or coordinator's discretion. During the second year, return to clinic every 3 months. Post transplant year 3-5 return every 6 months. There will be a comprehensive post transplant evaluation every year that may include LHC/RHC/biopsy, stress test, echo, CXR, and other health screening exams.    In addition to the clinical assessment, I have ordered Allomap testing for this patient to assist in identification of moderate/severe acute cellular rejection (ACR) in a pt with stable Allograft function instead of endomyocardial biopsy.     Patient is reminded to call with any health changes as these can be early signs of transplant complications. Patient is advised to make sure  any new medications or changes of old medications are discussed with a pharmacist or physician knowledgeable with transplant to avoid rejection/drug toxicity related to significant drug interactions.    UNOS Patient Status  Functional Status: 100% - Normal, no complaints, no evidence of disease  Physical Capacity: No Limitations  Working for Income: Yes. Full time.      Jacky Jameson MD    Pt care discussed with Dr Belle who agrees

## 2022-03-25 NOTE — TELEPHONE ENCOUNTER
Met with pt in clinic. Doing very well. Reviewed meds and labs with pt. Pt voiced no complaints. Discussed at length PAP for her everolimus and the fact she cannot afford the medication. Spoke to Vashti Miller who will call Monday to see if company received her application. Vashti will touch base with pt next week with up date.   Pt sees derm locally and already has appt for this year. She had mammogram 9/8/2021 and has an appt in August for her annual with GYN and mammogram. She just had eye appt in February. She will have her eye appt on 4/7/22. Last DEXA was 2018, pt is on Alendronate. She will follow up next week for another BMD.     Pt is up to date with all vaccinations.

## 2022-03-30 ENCOUNTER — TELEPHONE (OUTPATIENT)
Dept: TRANSPLANT | Facility: CLINIC | Age: 67
End: 2022-03-30
Payer: MEDICARE

## 2022-03-30 ENCOUNTER — PATIENT MESSAGE (OUTPATIENT)
Dept: TRANSPLANT | Facility: CLINIC | Age: 67
End: 2022-03-30
Payer: MEDICARE

## 2022-03-30 NOTE — TELEPHONE ENCOUNTER
----- Message from Claudine Mobley RN sent at 3/30/2022  9:53 AM CDT -----  Regarding: FW: Application Decision    ----- Message -----  From: Vashti Hayes  Sent: 3/30/2022   9:23 AM CDT  To: Claudine Mobley RN, dandy Oliver, #  Subject: Application Decision                             Good Morning,    We have been working on a PAP and [employer] application of enrollment for Ms. Cassandra Gonzales.  Her application was submitted and processed, however Fuad from the patient assistance foundation notified me that [employer] is no longer supporting the medication Zortress. The patient will not be enrolled into the program as a result.    Claudine, could you please inform the physician and discuss what other alternatives or options (if any) this patient may have.      Please advise once a decision has been made regarding Ms. Gonzales.    Thank you,    Vashti Hayes

## 2022-04-04 ENCOUNTER — PATIENT MESSAGE (OUTPATIENT)
Dept: TRANSPLANT | Facility: CLINIC | Age: 67
End: 2022-04-04
Payer: MEDICARE

## 2022-04-11 ENCOUNTER — HISTORICAL (OUTPATIENT)
Dept: ADMINISTRATIVE | Facility: HOSPITAL | Age: 67
End: 2022-04-11
Payer: MEDICARE

## 2022-04-29 VITALS
DIASTOLIC BLOOD PRESSURE: 99 MMHG | OXYGEN SATURATION: 99 % | WEIGHT: 129.44 LBS | HEIGHT: 63 IN | BODY MASS INDEX: 22.93 KG/M2 | SYSTOLIC BLOOD PRESSURE: 169 MMHG

## 2022-04-30 NOTE — H&P
Patient:   Cassandra Gonzales            MRN: [id number]            FIN: [id number]               Age:   66 years     Sex:  Female     :  1955   Associated Diagnoses:   None   Author:   Yuriy Ragsdale MD A      Chief Complaint   66-year-old female had a negative colonoscopy 10 years ago.66-year-old female had a negative colonoscopy 10 years ago.  Here for colon cancer screening.  She denies any lower GI complaints abdominal pain and weight loss tarry constipation or rectal bleeding.  She is a heart transplant recipient secondary to postpolio issues she was a patient of Dr. Randal castrejon O [place].  She is doing well been working happen from her son who is 22 years R Bill Avendaño had issues with her mitral valve having defibrillators and pacemakers all of which she has no longer.  She tolerated the prep was ready for the procedure Tolerated the prep was ready for the procedure Iaving defibrillator's and pacemakers all of which she has a longer period in happen from her son who is 22 years old when she gave birth she had issues with her mitral valve she is doing well been working she was a patient of Dr. Tee and this happened at [facility] in [place].  She is a heart transplant recipient secondary to postpartum issues for colon cancer screening.  She denies any lower GI complaints abdominal pain weight loss diarrhea constipation or rectal bleeding.      Health Status   Allergies:    Allergic Reactions (Selected)  Severity Not Documented  Sirolimus- Rash.,    Allergies (1) Active Reaction  sirolimus Rash     Current medications:  (Selected)   Prescriptions  Prescribed  levothyroxine 75 mcg (0.075 mg) oral tablet: See Instructions, 75 MCG ORAL DAILY, # 30 tab(s), 11 Refill(s), eRx: [id number]  Documented Medications  Documented  Amoxil 500 mg Cap: 2000 mg = 4 cap(s), Oral, TID  Calcium 600+D: Oral, 0 Refill(s)  CellCept 250 mg oral capsule: 16,500 mg = 66 cap(s),  Oral, BID, 1 hour before or 2 hours after meals, 0 Refill(s)  DEXAMETHASONE 0.5 MG/5 ML LIQ:   Fluzone PF Quadrivalent 9128-2303 intramuscular suspension:   HYDRALAZINE 50MG TABLETS (ORANGE): 75 mg = 1.5 tab(s), Oral, BID  LISINOPRIL 5 MG TABLET: 2.5 mg = 0.5 tab(s), Oral, Daily  Multiple Vitamins oral tab: 1 tab(s), Oral, Daily, # 30 tab(s), 0 Refill(s)  Suprep Bowel Prep Kit oral liquid:   Tobradex 0.3%-0.1% ophthalmic ointment: 0.5 in, OPTH, qPM  alendronate 70 mg oral tablet: 70 mg = 1 tab(s), Oral, qWeek, # 4 tab(s), 0 Refill(s)  amlodipine 5 mg oral tablet: 5 mg = 1 tab(s), Oral, Daily, # 30 tab(s), 0 Refill(s)  aspirin 81 mg oral Delayed Release (EC) tablet: 81 mg = 1 tab(s), Oral, Daily, 0 Refill(s)  atorvastatin 40 mg oral tablet: 40 mg = 1 tab(s), Oral, Daily, # 30 tab(s), 0 Refill(s)  magnesium oxide 400 mg oral tablet: 400 mg = 1 tab(s), Oral, BID, 0 Refill(s)  nystatin 100,000 units/mL oral suspension: 500,000 units = 5 mL, Oral, QID, swish and swallow, # 140 mL, 0 Refill(s)  tacrolimus 1 mg oral capsule: See Instructions, Take 3mg in AM and 4mg in PM., 0 Refill(s)  valGANciclovir 450 mg oral tablet: 900 mg = 2 tab(s), Oral, Daily, 0 Refill(s)   Problem list:    All Problems  Status post heart transplant / SNOMED CT 877501461 / Confirmed  Hypertension / SNOMED CT 41225700 / Confirmed  Hypothyroidism / SNOMED CT [id number] / Confirmed  PAF (paroxysmal atrial fibrillation) / SNOMED CT [id number] / Confirmed  Postpartum cardiomyopathy / SNOMED CT [id number] / Confirmed  TIA / SNOMED CT 626934151 / Confirmed  aphasia, lasted 24 hours.,    Active Problems (6)  Hypertension   Hypothyroidism   PAF (paroxysmal atrial fibrillation)   Postpartum cardiomyopathy   Status post heart transplant   TIA         Histories   Past Medical History:    No active or resolved past medical history items have been selected or recorded.   Family  History:    Cancer  Father (Dad, ): onset at 52 .  Comments:  2015 15:56 Britta Foster MD  throat  Congestive heart disease.  Mother (Mom, )  Cardiomyopathy.  Sister     Procedure history:    Heart transplant ([id number]) on 3/25/2016 at 60 Years.  Comments:  2016 16:00 Connie Prajapati  [facility]  Angiogram ([id number]) in the month of 2015 at 60 Years.  MUGA scan ([id number]) on 2015 at 60 Years.  Comments:  2015 15:27 Connie Prajapati  [facility] EF 20%  implantation of cardiac defibrillator in the month of 2012 at 57 Years.  Comments:  2015 15:26 Connie Prajapati Dr  mitral valve replacement on 2007 at 52 Years.  Comments:  2015 15:24 Connie Prajapati  mechanical, [facility]  mitral valve repair on 2004 at 49 Years.  Comments:  2015 15:24 Connie Prajapati  mitral valve regurgitation Dr Walters CIS unsuccessful   section ([id number]) in the month of 3/1999 at 43 Years.  Lumpectomy of breast ([id number]) in the month of 10/1975 at 20 Years.  Comments:  2015 15:21 Connie Prajapati  benign, Right   Social History        Social & Psychosocial Habits    Alcohol  2015  Use: Never    Employment/School  2015  Status: Employed    Description:     Highest education: Some college    Exercise  2015  Duration (average number of minutes): 0    Home/Environment  2015  Lives with: Alone, Children    Living situation: Home/Independent    Comment: , 1 son - 2015 15:28 - Connie Apodaca; NOK:  sisterMacrina - 2015 16:00 - Britta Witt MD    Nutrition/Health  2015  Type of diet: fluid restriction 2- 8 ounce fluids daily    Sexual  2015  Sexually active: No    Substance Use  2015  Use: Never    Tobacco  2019  Use: Never (less than 100 in l    Patient Wants Consult For Cessation Counseling  N/A    01/26/2020  Use: Never (less than 100 in l    Patient Wants Consult For Cessation Counseling No    Abuse/Neglect  01/26/2020  SHX Any signs of abuse or neglect No  .        Physical Examination      No qualifying data available      Health Maintenance      Health Maintenance     Pending (in the next year)        OverDue           Aspirin Therapy for CVD Prevention due  12/29/15  and every 1  year(s)           Depression Screening due  07/28/17  and every 1  year(s)           Obesity Screening due  01/01/21  and every 1  year(s)           Alcohol Misuse Screening due  01/02/21  and every 1  year(s)           Blood Pressure Screening due  01/25/21  and every 1  year(s)           Body Mass Index Check due  01/25/21  and every 1  year(s)           Hypertension Management-Blood Pressure due  01/25/21  and every 1  year(s)        Due            ADL Screening due  08/20/21  and every 1  year(s)           Cervical Cancer Screening due  08/20/21  Unknown Frequency           Colorectal Screening due  08/20/21  Unknown Frequency           Hypertension Management-Education due  08/20/21  and every 1  year(s)           Medicare Annual Wellness Exam due  08/20/21  and every 1  year(s)           Zoster Vaccine due  08/20/21  Unknown Frequency        Due In Future            Hypertension Management-BMP not due until  07/19/22  and every 1  year(s)     Satisfied (in the past 1 year)        Satisfied            Breast Cancer Screening on  09/03/20.  Satisfied by Asha Aguirre           Diabetes Screening on  07/19/21.  Satisfied by Magdalene Knight           Hypertension Management-BMP on  07/19/21.  Satisfied by Magdalene Knight           Lipid Screening on  07/19/21.  Satisfied by Magdalene Knight          Review / Management   Results review:     No qualifying data available.

## 2022-06-03 ENCOUNTER — PATIENT MESSAGE (OUTPATIENT)
Dept: TRANSPLANT | Facility: CLINIC | Age: 67
End: 2022-06-03
Payer: MEDICARE

## 2022-06-15 ENCOUNTER — PATIENT MESSAGE (OUTPATIENT)
Dept: TRANSPLANT | Facility: CLINIC | Age: 67
End: 2022-06-15
Payer: MEDICARE

## 2022-06-20 ENCOUNTER — LAB VISIT (OUTPATIENT)
Dept: LAB | Facility: HOSPITAL | Age: 67
End: 2022-06-20
Attending: INTERNAL MEDICINE
Payer: MEDICARE

## 2022-06-20 ENCOUNTER — PATIENT MESSAGE (OUTPATIENT)
Dept: TRANSPLANT | Facility: CLINIC | Age: 67
End: 2022-06-20
Payer: MEDICARE

## 2022-06-20 DIAGNOSIS — D89.9 DISEASE OF IMMUNE SYSTEM: ICD-10-CM

## 2022-06-20 DIAGNOSIS — Z94.1 HEART REPLACED BY TRANSPLANT: Primary | ICD-10-CM

## 2022-06-20 DIAGNOSIS — E78.2 MIXED HYPERLIPIDEMIA: ICD-10-CM

## 2022-06-20 DIAGNOSIS — Z79.899 ENCOUNTER FOR LONG-TERM (CURRENT) USE OF OTHER MEDICATIONS: ICD-10-CM

## 2022-06-20 LAB
ALBUMIN SERPL-MCNC: 3.7 GM/DL (ref 3.4–4.8)
ALBUMIN/GLOB SERPL: 1.2 RATIO (ref 1.1–2)
ALP SERPL-CCNC: 58 UNIT/L (ref 40–150)
ALT SERPL-CCNC: 17 UNIT/L (ref 0–55)
AST SERPL-CCNC: 28 UNIT/L (ref 5–34)
BILIRUBIN DIRECT+TOT PNL SERPL-MCNC: 0.5 MG/DL
BNP BLD-MCNC: 48.1 PG/ML
BUN SERPL-MCNC: 16.1 MG/DL (ref 9.8–20.1)
CALCIUM SERPL-MCNC: 9.4 MG/DL (ref 8.4–10.2)
CHLORIDE SERPL-SCNC: 102 MMOL/L (ref 98–107)
CO2 SERPL-SCNC: 26 MMOL/L (ref 23–31)
CREAT SERPL-MCNC: 0.75 MG/DL (ref 0.55–1.02)
ERYTHROCYTE [DISTWIDTH] IN BLOOD BY AUTOMATED COUNT: 14.2 % (ref 11.5–17)
GLOBULIN SER-MCNC: 3.1 GM/DL (ref 2.4–3.5)
GLUCOSE SERPL-MCNC: 84 MG/DL (ref 82–115)
HCT VFR BLD AUTO: 36.6 % (ref 37–47)
HGB BLD-MCNC: 11.2 GM/DL (ref 12–16)
MAGNESIUM SERPL-MCNC: 1.9 MG/DL (ref 1.6–2.6)
MCH RBC QN AUTO: 26.7 PG (ref 27–31)
MCHC RBC AUTO-ENTMCNC: 30.6 MG/DL (ref 33–36)
MCV RBC AUTO: 87.1 FL (ref 80–94)
NRBC BLD AUTO-RTO: 0 %
PLATELET # BLD AUTO: 252 X10(3)/MCL (ref 130–400)
PMV BLD AUTO: 10.5 FL (ref 9.4–12.4)
POTASSIUM SERPL-SCNC: 4.8 MMOL/L (ref 3.5–5.1)
PROT SERPL-MCNC: 6.8 GM/DL (ref 5.8–7.6)
RBC # BLD AUTO: 4.2 X10(6)/MCL (ref 4.2–5.4)
SODIUM SERPL-SCNC: 136 MMOL/L (ref 136–145)
WBC # SPEC AUTO: 6.1 X10(3)/MCL (ref 4.5–11.5)

## 2022-06-20 PROCEDURE — 36415 COLL VENOUS BLD VENIPUNCTURE: CPT

## 2022-06-20 PROCEDURE — 85027 COMPLETE CBC AUTOMATED: CPT

## 2022-06-20 PROCEDURE — 83880 ASSAY OF NATRIURETIC PEPTIDE: CPT

## 2022-06-20 PROCEDURE — 80053 COMPREHEN METABOLIC PANEL: CPT

## 2022-06-20 PROCEDURE — 83735 ASSAY OF MAGNESIUM: CPT

## 2022-06-22 LAB
EVEROLIMUS BLD-MCNC: 4.5 NG/ML
TACROLIMUS TROUGH BLD-MCNC: 3 NG/ML

## 2022-06-24 ENCOUNTER — TELEPHONE (OUTPATIENT)
Dept: TRANSPLANT | Facility: CLINIC | Age: 67
End: 2022-06-24
Payer: MEDICARE

## 2022-08-31 ENCOUNTER — PATIENT MESSAGE (OUTPATIENT)
Dept: TRANSPLANT | Facility: CLINIC | Age: 67
End: 2022-08-31
Payer: MEDICARE

## 2022-08-31 ENCOUNTER — OFFICE VISIT (OUTPATIENT)
Dept: URGENT CARE | Facility: CLINIC | Age: 67
End: 2022-08-31
Payer: MEDICARE

## 2022-08-31 VITALS
HEART RATE: 98 BPM | RESPIRATION RATE: 20 BRPM | BODY MASS INDEX: 22.68 KG/M2 | SYSTOLIC BLOOD PRESSURE: 167 MMHG | WEIGHT: 128 LBS | OXYGEN SATURATION: 100 % | TEMPERATURE: 99 F | DIASTOLIC BLOOD PRESSURE: 98 MMHG | HEIGHT: 63 IN

## 2022-08-31 DIAGNOSIS — J06.9 ACUTE URI: Primary | ICD-10-CM

## 2022-08-31 DIAGNOSIS — J02.9 SORETHROAT: ICD-10-CM

## 2022-08-31 LAB
CTP QC/QA: YES
CTP QC/QA: YES
S PYO RRNA THROAT QL PROBE: NEGATIVE
SARS-COV-2 RDRP RESP QL NAA+PROBE: NEGATIVE

## 2022-08-31 PROCEDURE — 87880 POCT RAPID STREP A: ICD-10-PCS | Mod: QW,,, | Performed by: FAMILY MEDICINE

## 2022-08-31 PROCEDURE — 99213 OFFICE O/P EST LOW 20 MIN: CPT | Mod: ,,, | Performed by: FAMILY MEDICINE

## 2022-08-31 PROCEDURE — 99213 PR OFFICE/OUTPT VISIT, EST, LEVL III, 20-29 MIN: ICD-10-PCS | Mod: ,,, | Performed by: FAMILY MEDICINE

## 2022-08-31 PROCEDURE — 87880 STREP A ASSAY W/OPTIC: CPT | Mod: QW,,, | Performed by: FAMILY MEDICINE

## 2022-08-31 PROCEDURE — U0002: ICD-10-PCS | Mod: QW,CR,, | Performed by: FAMILY MEDICINE

## 2022-08-31 PROCEDURE — U0002 COVID-19 LAB TEST NON-CDC: HCPCS | Mod: QW,CR,, | Performed by: FAMILY MEDICINE

## 2022-08-31 NOTE — PROGRESS NOTES
"Subjective:       Patient ID: Cassandra Gonzales is a 67 y.o. female.    Vitals:  height is 5' 3" (1.6 m) and weight is 58.1 kg (128 lb). Her oral temperature is 99.4 °F (37.4 °C). Her blood pressure is 167/98 (abnormal) and her pulse is 98. Her respiration is 20 and oxygen saturation is 100%.     Chief Complaint: Sore Throat (Sore throat, runny nose, since last night)    Sore throat, runny nose, since last night, took Zyrtec, Tylenol      Sore Throat       HENT:  Positive for sore throat.      [place]:  67-year-old known for multiple medical conditions including CAD, follows up with primary MD Dr. Mahogany Davey.  Present to clinic with concerns of sore throat, nasal congestion and runny nose since last night.  Temp in the clinic 99.4.  No concerns of positive exposure to infections.  No recent travel.  Patient is vaccinated for COVID-19.  Reviewed the vital signs appears stable with O2 sats 100%.  Blood pressure elevated, patient currently on medications for hypertension.  States possible anxiousness    ROS :  Constitutional_ No Fever, Body aches, Chills  HENT_Sore throat, Difficulty swallowing, postnasal drainage  Respiratory_no wheezing, no shortness of breath  Cardiovascular_no chest pain  Gastrointestinal_ No nausea,No vomiting, No  abdominal pain  Musculoskeletal_no joint pain, no joint swelling  Integumentary_no skin rash     Objective:      Physical Exam    General : Alert and Oriented, No apparent distress, afebrile, appears comfortable sitting on exam table, clear speech and appropriate communication  Neck - supple  HENT : Oropharynx no redness or swelling. Tonsils not enlarged, no exudate. TMs intact mild fluid no redness.   Respiratory : Bilateral equal breath sounds, nonlabored respirations  Cardiovascular : Rate, rhythm regular, loud heart sounds, normal volume pulse  Integumentary : Warm, Dry and no rash    Assessment:       1. Acute URI    2. Sorethroat          Plan:       Discussed the " physical finding, concerns of possible early testing examination.  Discussed in detail on allergies or viral etiology   Monitor the symptoms closely.  COVID-19 test negative, strep test negative today  For persistent and worsening symptoms considering the duration of symptoms of 12 hours encouraged patient to return to clinic Friday morning for repeat swab as nurse's visit  Continue Tylenol for sore throat.  Warm saltwater gargles for sore throat.  Adequate hydration and rest.  Call or return to clinic for any questions.  Work excuse for today     Acute URI    Sorethroat  -     POCT COVID-19 Rapid Screening  -     POCT rapid strep A

## 2022-08-31 NOTE — PATIENT INSTRUCTIONS
Discussed the physical finding, concerns of possible early testing examination.  Discussed in detail on allergies or viral etiology   Monitor the symptoms closely.  COVID-19 test negative, strep test negative today  For persistent and worsening symptoms considering the duration of symptoms of 12 hours encouraged patient to return to clinic Friday morning for repeat swab as nurse's visit  Continue Tylenol for sore throat.  Warm saltwater gargles for sore throat.  Adequate hydration and rest.  Call or return to clinic for any questions.  Work excuse for today

## 2022-09-13 ENCOUNTER — TELEPHONE (OUTPATIENT)
Dept: TRANSPLANT | Facility: CLINIC | Age: 67
End: 2022-09-13
Payer: MEDICARE

## 2022-09-13 NOTE — TELEPHONE ENCOUNTER
Received Dexa and Mammogram completed today both completed at home. Mammogram WNL  DEXA images improved from 2 years ago,   Results can be found under Media.

## 2022-09-15 ENCOUNTER — PATIENT MESSAGE (OUTPATIENT)
Dept: TRANSPLANT | Facility: CLINIC | Age: 67
End: 2022-09-15
Payer: MEDICARE

## 2022-09-22 ENCOUNTER — HISTORICAL (OUTPATIENT)
Dept: ADMINISTRATIVE | Facility: HOSPITAL | Age: 67
End: 2022-09-22
Payer: MEDICARE

## 2022-10-04 ENCOUNTER — PATIENT MESSAGE (OUTPATIENT)
Dept: TRANSPLANT | Facility: CLINIC | Age: 67
End: 2022-10-04
Payer: COMMERCIAL

## 2022-10-05 ENCOUNTER — PATIENT MESSAGE (OUTPATIENT)
Dept: TRANSPLANT | Facility: CLINIC | Age: 67
End: 2022-10-05
Payer: COMMERCIAL

## 2022-10-05 ENCOUNTER — LAB VISIT (OUTPATIENT)
Dept: LAB | Facility: HOSPITAL | Age: 67
End: 2022-10-05
Attending: INTERNAL MEDICINE
Payer: COMMERCIAL

## 2022-10-05 DIAGNOSIS — E03.9 HYPOTHYROIDISM, UNSPECIFIED TYPE: Primary | ICD-10-CM

## 2022-10-05 DIAGNOSIS — Z94.1 STATUS POST HEART TRANSPLANT: ICD-10-CM

## 2022-10-05 DIAGNOSIS — T86.20 COMPLICATION OF HEART TRANSPLANT, UNSPECIFIED COMPLICATION: ICD-10-CM

## 2022-10-05 DIAGNOSIS — R06.02 SHORTNESS OF BREATH: ICD-10-CM

## 2022-10-05 DIAGNOSIS — Z79.52 LONG TERM CURRENT USE OF SYSTEMIC STEROIDS: ICD-10-CM

## 2022-10-05 DIAGNOSIS — E78.2 MIXED HYPERLIPIDEMIA: ICD-10-CM

## 2022-10-05 DIAGNOSIS — T86.290 VASCULOPATHY OF CARDIAC ALLOGRAFT: ICD-10-CM

## 2022-10-05 DIAGNOSIS — Z79.899 ENCOUNTER FOR LONG-TERM (CURRENT) USE OF MEDICATIONS: ICD-10-CM

## 2022-10-05 LAB
ALBUMIN SERPL-MCNC: 3.9 GM/DL (ref 3.4–4.8)
ALBUMIN/GLOB SERPL: 1.3 RATIO (ref 1.1–2)
ALP SERPL-CCNC: 51 UNIT/L (ref 40–150)
ALT SERPL-CCNC: 15 UNIT/L (ref 0–55)
AST SERPL-CCNC: 24 UNIT/L (ref 5–34)
BASOPHILS # BLD AUTO: 0.04 X10(3)/MCL (ref 0–0.2)
BASOPHILS NFR BLD AUTO: 0.8 %
BILIRUBIN DIRECT+TOT PNL SERPL-MCNC: 0.6 MG/DL
BNP BLD-MCNC: 42 PG/ML
BUN SERPL-MCNC: 25.4 MG/DL (ref 9.8–20.1)
CALCIUM SERPL-MCNC: 9.4 MG/DL (ref 8.4–10.2)
CHLORIDE SERPL-SCNC: 105 MMOL/L (ref 98–107)
CO2 SERPL-SCNC: 27 MMOL/L (ref 23–31)
CREAT SERPL-MCNC: 0.79 MG/DL (ref 0.55–1.02)
EOSINOPHIL # BLD AUTO: 0.06 X10(3)/MCL (ref 0–0.9)
EOSINOPHIL NFR BLD AUTO: 1.1 %
ERYTHROCYTE [DISTWIDTH] IN BLOOD BY AUTOMATED COUNT: 14 % (ref 11.5–17)
GFR SERPLBLD CREATININE-BSD FMLA CKD-EPI: >60 MLS/MIN/1.73/M2
GLOBULIN SER-MCNC: 3.1 GM/DL (ref 2.4–3.5)
GLUCOSE SERPL-MCNC: 84 MG/DL (ref 82–115)
HCT VFR BLD AUTO: 37.2 % (ref 37–47)
HGB BLD-MCNC: 11.1 GM/DL (ref 12–16)
IMM GRANULOCYTES # BLD AUTO: 0.02 X10(3)/MCL (ref 0–0.04)
IMM GRANULOCYTES NFR BLD AUTO: 0.4 %
LYMPHOCYTES # BLD AUTO: 1.36 X10(3)/MCL (ref 0.6–4.6)
LYMPHOCYTES NFR BLD AUTO: 25.5 %
MCH RBC QN AUTO: 27.1 PG (ref 27–31)
MCHC RBC AUTO-ENTMCNC: 29.8 MG/DL (ref 33–36)
MCV RBC AUTO: 90.7 FL (ref 80–94)
MONOCYTES # BLD AUTO: 0.69 X10(3)/MCL (ref 0.1–1.3)
MONOCYTES NFR BLD AUTO: 12.9 %
NEUTROPHILS # BLD AUTO: 3.2 X10(3)/MCL (ref 2.1–9.2)
NEUTROPHILS NFR BLD AUTO: 59.3 %
NRBC BLD AUTO-RTO: 0 %
PLATELET # BLD AUTO: 233 X10(3)/MCL (ref 130–400)
PMV BLD AUTO: 10.2 FL (ref 7.4–10.4)
POTASSIUM SERPL-SCNC: 5.4 MMOL/L (ref 3.5–5.1)
PROT SERPL-MCNC: 7 GM/DL (ref 5.8–7.6)
RBC # BLD AUTO: 4.1 X10(6)/MCL (ref 4.2–5.4)
SODIUM SERPL-SCNC: 137 MMOL/L (ref 136–145)
T3FREE SERPL-MCNC: 2.4 PG/ML (ref 1.57–3.91)
T4 FREE SERPL-MCNC: 1.36 NG/DL (ref 0.7–1.48)
TSH SERPL-ACNC: 1.52 UIU/ML (ref 0.35–4.94)
WBC # SPEC AUTO: 5.3 X10(3)/MCL (ref 4.5–11.5)

## 2022-10-05 PROCEDURE — 85025 COMPLETE CBC W/AUTO DIFF WBC: CPT

## 2022-10-05 PROCEDURE — 83880 ASSAY OF NATRIURETIC PEPTIDE: CPT

## 2022-10-05 PROCEDURE — 80053 COMPREHEN METABOLIC PANEL: CPT

## 2022-10-05 PROCEDURE — 84439 ASSAY OF FREE THYROXINE: CPT

## 2022-10-05 PROCEDURE — 36415 COLL VENOUS BLD VENIPUNCTURE: CPT

## 2022-10-05 PROCEDURE — 84481 FREE ASSAY (FT-3): CPT

## 2022-10-05 PROCEDURE — 84443 ASSAY THYROID STIM HORMONE: CPT

## 2022-10-05 PROCEDURE — 80197 ASSAY OF TACROLIMUS: CPT

## 2022-10-06 LAB — TACROLIMUS TROUGH BLD-MCNC: 4.3 NG/ML

## 2022-10-13 ENCOUNTER — LAB VISIT (OUTPATIENT)
Dept: LAB | Facility: HOSPITAL | Age: 67
End: 2022-10-13
Attending: INTERNAL MEDICINE
Payer: MEDICARE

## 2022-10-13 DIAGNOSIS — R06.02 SHORTNESS OF BREATH: ICD-10-CM

## 2022-10-13 DIAGNOSIS — Z79.899 ENCOUNTER FOR LONG-TERM (CURRENT) USE OF MEDICATIONS: ICD-10-CM

## 2022-10-13 DIAGNOSIS — T86.20 COMPLICATION OF HEART TRANSPLANT, UNSPECIFIED COMPLICATION: ICD-10-CM

## 2022-10-13 DIAGNOSIS — Z94.1 STATUS POST HEART TRANSPLANT: ICD-10-CM

## 2022-10-13 DIAGNOSIS — Z79.52 LONG TERM CURRENT USE OF SYSTEMIC STEROIDS: ICD-10-CM

## 2022-10-13 DIAGNOSIS — E78.2 MIXED HYPERLIPIDEMIA: ICD-10-CM

## 2022-10-13 DIAGNOSIS — T86.290 VASCULOPATHY OF CARDIAC ALLOGRAFT: ICD-10-CM

## 2022-10-13 LAB
ALBUMIN SERPL-MCNC: 3.8 GM/DL (ref 3.4–4.8)
ALBUMIN/GLOB SERPL: 1.2 RATIO (ref 1.1–2)
ALP SERPL-CCNC: 49 UNIT/L (ref 40–150)
ALT SERPL-CCNC: 14 UNIT/L (ref 0–55)
AST SERPL-CCNC: 24 UNIT/L (ref 5–34)
BILIRUBIN DIRECT+TOT PNL SERPL-MCNC: 0.6 MG/DL
BNP BLD-MCNC: 66.2 PG/ML
BUN SERPL-MCNC: 23.3 MG/DL (ref 9.8–20.1)
CALCIUM SERPL-MCNC: 9.1 MG/DL (ref 8.4–10.2)
CHLORIDE SERPL-SCNC: 106 MMOL/L (ref 98–107)
CO2 SERPL-SCNC: 23 MMOL/L (ref 23–31)
CREAT SERPL-MCNC: 0.81 MG/DL (ref 0.55–1.02)
GFR SERPLBLD CREATININE-BSD FMLA CKD-EPI: >60 MLS/MIN/1.73/M2
GLOBULIN SER-MCNC: 3.1 GM/DL (ref 2.4–3.5)
GLUCOSE SERPL-MCNC: 83 MG/DL (ref 82–115)
POTASSIUM SERPL-SCNC: 5.2 MMOL/L (ref 3.5–5.1)
PROT SERPL-MCNC: 6.9 GM/DL (ref 5.8–7.6)
SODIUM SERPL-SCNC: 138 MMOL/L (ref 136–145)

## 2022-10-13 PROCEDURE — 36415 COLL VENOUS BLD VENIPUNCTURE: CPT

## 2022-10-13 PROCEDURE — 80053 COMPREHEN METABOLIC PANEL: CPT

## 2022-10-13 PROCEDURE — 83880 ASSAY OF NATRIURETIC PEPTIDE: CPT

## 2022-10-13 PROCEDURE — 80197 ASSAY OF TACROLIMUS: CPT

## 2022-10-14 LAB — TACROLIMUS TROUGH BLD-MCNC: 3.9 NG/ML

## 2022-10-24 ENCOUNTER — PATIENT MESSAGE (OUTPATIENT)
Dept: TRANSPLANT | Facility: CLINIC | Age: 67
End: 2022-10-24
Payer: COMMERCIAL

## 2022-10-24 RX ORDER — AMOXICILLIN AND CLAVULANATE POTASSIUM 500; 125 MG/1; MG/1
1 TABLET, FILM COATED ORAL 2 TIMES DAILY
COMMUNITY
Start: 2022-10-08 | End: 2023-03-27

## 2022-10-24 RX ORDER — TOBRAMYCIN/DEXAMETHASONE 0.3 %-0.1%
1 OINTMENT (GRAM) OPHTHALMIC (EYE) NIGHTLY
COMMUNITY
Start: 2022-10-05 | End: 2023-03-27

## 2022-11-16 ENCOUNTER — PATIENT MESSAGE (OUTPATIENT)
Dept: TRANSPLANT | Facility: CLINIC | Age: 67
End: 2022-11-16
Payer: COMMERCIAL

## 2022-11-26 ENCOUNTER — DOCUMENTATION ONLY (OUTPATIENT)
Dept: INTERNAL MEDICINE | Facility: CLINIC | Age: 67
End: 2022-11-26
Payer: COMMERCIAL

## 2022-12-02 ENCOUNTER — TELEPHONE (OUTPATIENT)
Dept: TRANSPLANT | Facility: CLINIC | Age: 67
End: 2022-12-02
Payer: COMMERCIAL

## 2022-12-02 ENCOUNTER — PATIENT MESSAGE (OUTPATIENT)
Dept: TRANSPLANT | Facility: CLINIC | Age: 67
End: 2022-12-02
Payer: COMMERCIAL

## 2022-12-28 ENCOUNTER — PATIENT MESSAGE (OUTPATIENT)
Dept: TRANSPLANT | Facility: CLINIC | Age: 67
End: 2022-12-28
Payer: COMMERCIAL

## 2022-12-29 ENCOUNTER — LAB VISIT (OUTPATIENT)
Dept: LAB | Facility: HOSPITAL | Age: 67
End: 2022-12-29
Attending: INTERNAL MEDICINE
Payer: MEDICARE

## 2022-12-29 ENCOUNTER — TELEPHONE (OUTPATIENT)
Dept: TRANSPLANT | Facility: CLINIC | Age: 67
End: 2022-12-29
Payer: COMMERCIAL

## 2022-12-29 DIAGNOSIS — Z94.1 STATUS POST HEART TRANSPLANT: ICD-10-CM

## 2022-12-29 DIAGNOSIS — Z79.899 ENCOUNTER FOR LONG-TERM (CURRENT) USE OF MEDICATIONS: ICD-10-CM

## 2022-12-29 DIAGNOSIS — E78.2 MIXED HYPERLIPIDEMIA: ICD-10-CM

## 2022-12-29 DIAGNOSIS — T86.20 COMPLICATION OF HEART TRANSPLANT, UNSPECIFIED COMPLICATION: ICD-10-CM

## 2022-12-29 DIAGNOSIS — R06.02 SHORTNESS OF BREATH: ICD-10-CM

## 2022-12-29 DIAGNOSIS — T86.290 VASCULOPATHY OF CARDIAC ALLOGRAFT: ICD-10-CM

## 2022-12-29 DIAGNOSIS — Z79.52 LONG TERM CURRENT USE OF SYSTEMIC STEROIDS: ICD-10-CM

## 2022-12-29 LAB
ALBUMIN SERPL-MCNC: 3.7 G/DL (ref 3.4–4.8)
ALBUMIN/GLOB SERPL: 1.3 RATIO (ref 1.1–2)
ALP SERPL-CCNC: 53 UNIT/L (ref 40–150)
ALT SERPL-CCNC: 18 UNIT/L (ref 0–55)
AST SERPL-CCNC: 25 UNIT/L (ref 5–34)
BASOPHILS # BLD AUTO: 0.05 X10(3)/MCL (ref 0–0.2)
BASOPHILS NFR BLD AUTO: 1 %
BILIRUBIN DIRECT+TOT PNL SERPL-MCNC: 0.5 MG/DL
BNP BLD-MCNC: 60.9 PG/ML
BUN SERPL-MCNC: 24.4 MG/DL (ref 9.8–20.1)
CALCIUM SERPL-MCNC: 9.3 MG/DL (ref 8.4–10.2)
CHLORIDE SERPL-SCNC: 105 MMOL/L (ref 98–107)
CO2 SERPL-SCNC: 26 MMOL/L (ref 23–31)
CREAT SERPL-MCNC: 0.93 MG/DL (ref 0.55–1.02)
EOSINOPHIL # BLD AUTO: 0.07 X10(3)/MCL (ref 0–0.9)
EOSINOPHIL NFR BLD AUTO: 1.4 %
ERYTHROCYTE [DISTWIDTH] IN BLOOD BY AUTOMATED COUNT: 13.5 % (ref 11–14.5)
GFR SERPLBLD CREATININE-BSD FMLA CKD-EPI: >60 MLS/MIN/1.73/M2
GLOBULIN SER-MCNC: 2.8 GM/DL (ref 2.4–3.5)
GLUCOSE SERPL-MCNC: 85 MG/DL (ref 82–115)
HCT VFR BLD AUTO: 34 % (ref 37–47)
HGB BLD-MCNC: 10.4 GM/DL (ref 12–16)
IMM GRANULOCYTES # BLD AUTO: 0.02 X10(3)/MCL (ref 0–0.04)
IMM GRANULOCYTES NFR BLD AUTO: 0.4 %
LYMPHOCYTES # BLD AUTO: 1.52 X10(3)/MCL (ref 0.6–4.6)
LYMPHOCYTES NFR BLD AUTO: 31.1 %
MAGNESIUM SERPL-MCNC: 1.6 MG/DL (ref 1.6–2.6)
MCH RBC QN AUTO: 27.2 PG
MCHC RBC AUTO-ENTMCNC: 30.6 MG/DL (ref 33–36)
MCV RBC AUTO: 88.8 FL (ref 80–94)
MONOCYTES # BLD AUTO: 0.52 X10(3)/MCL (ref 0.1–1.3)
MONOCYTES NFR BLD AUTO: 10.6 %
NEUTROPHILS # BLD AUTO: 2.71 X10(3)/MCL (ref 2.1–9.2)
NEUTROPHILS NFR BLD AUTO: 55.5 %
NRBC BLD AUTO-RTO: 0 % (ref 0–1)
PLATELET # BLD AUTO: 248 X10(3)/MCL (ref 140–371)
PMV BLD AUTO: 9.7 FL (ref 9.4–12.4)
POTASSIUM SERPL-SCNC: 4.8 MMOL/L (ref 3.5–5.1)
PROT SERPL-MCNC: 6.5 GM/DL (ref 5.8–7.6)
RBC # BLD AUTO: 3.83 X10(6)/MCL (ref 4.2–5.4)
SODIUM SERPL-SCNC: 138 MMOL/L (ref 136–145)
WBC # SPEC AUTO: 4.9 X10(3)/MCL (ref 4.5–11.5)

## 2022-12-29 PROCEDURE — 36415 COLL VENOUS BLD VENIPUNCTURE: CPT

## 2022-12-29 PROCEDURE — 80169 DRUG ASSAY EVEROLIMUS: CPT

## 2022-12-29 PROCEDURE — 83735 ASSAY OF MAGNESIUM: CPT

## 2022-12-29 PROCEDURE — 80053 COMPREHEN METABOLIC PANEL: CPT

## 2022-12-29 PROCEDURE — 83880 ASSAY OF NATRIURETIC PEPTIDE: CPT

## 2022-12-29 PROCEDURE — 85025 COMPLETE CBC W/AUTO DIFF WBC: CPT

## 2022-12-30 LAB
EVEROLIMUS BLD-MCNC: 3.8 NG/ML
TACROLIMUS TROUGH BLD-MCNC: 3.1 NG/ML

## 2023-01-05 ENCOUNTER — PATIENT MESSAGE (OUTPATIENT)
Dept: TRANSPLANT | Facility: CLINIC | Age: 68
End: 2023-01-05
Payer: COMMERCIAL

## 2023-01-17 ENCOUNTER — PATIENT MESSAGE (OUTPATIENT)
Dept: TRANSPLANT | Facility: CLINIC | Age: 68
End: 2023-01-17
Payer: COMMERCIAL

## 2023-01-24 ENCOUNTER — PATIENT MESSAGE (OUTPATIENT)
Dept: TRANSPLANT | Facility: CLINIC | Age: 68
End: 2023-01-24
Payer: COMMERCIAL

## 2023-02-27 DIAGNOSIS — Z94.1 S/P ORTHOTOPIC HEART TRANSPLANT: ICD-10-CM

## 2023-02-28 RX ORDER — MYCOPHENOLATE MOFETIL 250 MG/1
1000 CAPSULE ORAL 2 TIMES DAILY
Qty: 240 CAPSULE | Refills: 11 | Status: SHIPPED | OUTPATIENT
Start: 2023-02-28 | End: 2024-02-09 | Stop reason: SDUPTHER

## 2023-02-28 RX ORDER — EVEROLIMUS 0.5 MG/1
1 TABLET ORAL 2 TIMES DAILY
Qty: 360 TABLET | Refills: 3 | Status: SHIPPED | OUTPATIENT
Start: 2023-02-28 | End: 2024-03-11 | Stop reason: SDUPTHER

## 2023-02-28 RX ORDER — TACROLIMUS 1 MG/1
1 CAPSULE ORAL EVERY 12 HOURS
Qty: 60 CAPSULE | Refills: 11 | Status: SHIPPED | OUTPATIENT
Start: 2023-02-28 | End: 2024-02-09 | Stop reason: SDUPTHER

## 2023-03-27 ENCOUNTER — HOSPITAL ENCOUNTER (OUTPATIENT)
Dept: RADIOLOGY | Facility: HOSPITAL | Age: 68
Discharge: HOME | End: 2023-03-27
Attending: INTERNAL MEDICINE
Payer: MEDICARE

## 2023-03-27 ENCOUNTER — HOSPITAL ENCOUNTER (OUTPATIENT)
Dept: CARDIOLOGY | Facility: HOSPITAL | Age: 68
Discharge: HOME | End: 2023-03-27
Attending: INTERNAL MEDICINE
Payer: MEDICARE

## 2023-03-27 ENCOUNTER — PATIENT MESSAGE (OUTPATIENT)
Dept: TRANSPLANT | Facility: CLINIC | Age: 68
End: 2023-03-27

## 2023-03-27 ENCOUNTER — TELEPHONE (OUTPATIENT)
Dept: TRANSPLANT | Facility: CLINIC | Age: 68
End: 2023-03-27

## 2023-03-27 ENCOUNTER — OFFICE VISIT (OUTPATIENT)
Dept: TRANSPLANT | Facility: CLINIC | Age: 68
End: 2023-03-27
Payer: MEDICARE

## 2023-03-27 VITALS
WEIGHT: 135.38 LBS | SYSTOLIC BLOOD PRESSURE: 153 MMHG | HEART RATE: 94 BPM | BODY MASS INDEX: 23.99 KG/M2 | HEIGHT: 63 IN | DIASTOLIC BLOOD PRESSURE: 74 MMHG

## 2023-03-27 VITALS — HEIGHT: 63 IN | BODY MASS INDEX: 23.04 KG/M2 | WEIGHT: 130 LBS

## 2023-03-27 DIAGNOSIS — Z94.1 STATUS POST HEART TRANSPLANT: ICD-10-CM

## 2023-03-27 DIAGNOSIS — I10 ESSENTIAL HYPERTENSION: ICD-10-CM

## 2023-03-27 DIAGNOSIS — E78.2 MIXED HYPERLIPIDEMIA: ICD-10-CM

## 2023-03-27 DIAGNOSIS — I10 PRIMARY HYPERTENSION: ICD-10-CM

## 2023-03-27 DIAGNOSIS — T86.20 COMPLICATION OF HEART TRANSPLANT, UNSPECIFIED COMPLICATION: ICD-10-CM

## 2023-03-27 DIAGNOSIS — D84.9 IMMUNOSUPPRESSION: ICD-10-CM

## 2023-03-27 DIAGNOSIS — Z94.1 HEART TRANSPLANTED: ICD-10-CM

## 2023-03-27 DIAGNOSIS — E78.5 HYPERLIPIDEMIA LDL GOAL <70: ICD-10-CM

## 2023-03-27 DIAGNOSIS — Z79.899 ENCOUNTER FOR LONG-TERM (CURRENT) USE OF MEDICATIONS: ICD-10-CM

## 2023-03-27 DIAGNOSIS — R06.02 SHORTNESS OF BREATH: ICD-10-CM

## 2023-03-27 DIAGNOSIS — T86.290 CARDIAC ALLOGRAFT VASCULOPATHY: Primary | ICD-10-CM

## 2023-03-27 DIAGNOSIS — E03.9 HYPOTHYROIDISM (ACQUIRED): ICD-10-CM

## 2023-03-27 DIAGNOSIS — D84.9 IMMUNOCOMPROMISED PATIENT: ICD-10-CM

## 2023-03-27 DIAGNOSIS — Z79.52 LONG TERM CURRENT USE OF SYSTEMIC STEROIDS: ICD-10-CM

## 2023-03-27 DIAGNOSIS — Z79.60 LONG-TERM USE OF IMMUNOSUPPRESSANT MEDICATION: ICD-10-CM

## 2023-03-27 LAB
ASCENDING AORTA: 3.24 CM
BSA FOR ECHO PROCEDURE: 1.62 M2
CV ECHO LV RWT: 0.44 CM
CV STRESS BASE HR: 89 BPM
DIASTOLIC BLOOD PRESSURE: 91 MMHG
DOP CALC LVOT AREA: 4.1 CM2
DOP CALC LVOT DIAMETER: 2.28 CM
DOP CALC LVOT PEAK VEL: 1 M/S
DOP CALC LVOT STROKE VOLUME: 85.86 CM3
DOP CALCLVOT PEAK VEL VTI: 21.04 CM
E WAVE DECELERATION TIME: 141.72 MSEC
E/A RATIO: 1.88
E/E' RATIO: 13.71 M/S
ECHO LV POSTERIOR WALL: 1.04 CM (ref 0.6–1.1)
EJECTION FRACTION: 60 %
FRACTIONAL SHORTENING: 30 % (ref 28–44)
INTERVENTRICULAR SEPTUM: 0.83 CM (ref 0.6–1.1)
IVRT: 77.07 MSEC
LEFT INTERNAL DIMENSION IN SYSTOLE: 3.28 CM (ref 2.1–4)
LEFT VENTRICLE DIASTOLIC VOLUME INDEX: 64.04 ML/M2
LEFT VENTRICLE DIASTOLIC VOLUME: 103.1 ML
LEFT VENTRICLE MASS INDEX: 94 G/M2
LEFT VENTRICLE SYSTOLIC VOLUME INDEX: 27 ML/M2
LEFT VENTRICLE SYSTOLIC VOLUME: 43.46 ML
LEFT VENTRICULAR INTERNAL DIMENSION IN DIASTOLE: 4.71 CM (ref 3.5–6)
LEFT VENTRICULAR MASS: 150.7 G
LV LATERAL E/E' RATIO: 10.67 M/S
LV SEPTAL E/E' RATIO: 19.2 M/S
MV PEAK A VEL: 0.51 M/S
MV PEAK E VEL: 0.96 M/S
MV STENOSIS PRESSURE HALF TIME: 41.1 MS
MV VALVE AREA P 1/2 METHOD: 5.35 CM2
OHS CV CPX 1 MINUTE RECOVERY HEART RATE: 131 BPM
OHS CV CPX 85 PERCENT MAX PREDICTED HEART RATE MALE: 126
OHS CV CPX ESTIMATED METS: 11
OHS CV CPX MAX PREDICTED HEART RATE: 148
OHS CV CPX PATIENT IS FEMALE: 1
OHS CV CPX PATIENT IS MALE: 0
OHS CV CPX PEAK DIASTOLIC BLOOD PRESSURE: 91 MMHG
OHS CV CPX PEAK HEAR RATE: 150 BPM
OHS CV CPX PEAK RATE PRESSURE PRODUCT: NORMAL
OHS CV CPX PEAK SYSTOLIC BLOOD PRESSURE: 212 MMHG
OHS CV CPX PERCENT MAX PREDICTED HEART RATE ACHIEVED: 101
OHS CV CPX RATE PRESSURE PRODUCT PRESENTING: NORMAL
PISA TR MAX VEL: 2.45 M/S
RA PRESSURE: 3 MMHG
RIGHT VENTRICULAR END-DIASTOLIC DIMENSION: 3.67 CM
RV TISSUE DOPPLER FREE WALL SYSTOLIC VELOCITY 1 (APICAL 4 CHAMBER VIEW): 7.56 CM/S
SINUS: 3.43 CM
STJ: 2.97 CM
STRESS ECHO POST EXERCISE DUR MIN: 6 MINUTES
STRESS ECHO POST EXERCISE DUR SEC: 55 SECONDS
STRESS ST DEPRESSION: 1.5 MM
SYSTOLIC BLOOD PRESSURE: 166 MMHG
TDI LATERAL: 0.09 M/S
TDI SEPTAL: 0.05 M/S
TDI: 0.07 M/S
TR MAX PG: 24 MMHG
TRICUSPID ANNULAR PLANE SYSTOLIC EXCURSION: 1.54 CM
TV REST PULMONARY ARTERY PRESSURE: 27 MMHG

## 2023-03-27 PROCEDURE — 71046 X-RAY EXAM CHEST 2 VIEWS: CPT | Mod: TC,FY

## 2023-03-27 PROCEDURE — 99999 PR PBB SHADOW E&M-EST. PATIENT-LVL III: ICD-10-PCS | Mod: PBBFAC,,, | Performed by: INTERNAL MEDICINE

## 2023-03-27 PROCEDURE — 99213 OFFICE O/P EST LOW 20 MIN: CPT | Mod: PBBFAC,25 | Performed by: INTERNAL MEDICINE

## 2023-03-27 PROCEDURE — 93351 STRESS TTE COMPLETE: CPT | Mod: 26,,, | Performed by: INTERNAL MEDICINE

## 2023-03-27 PROCEDURE — 71046 XR CHEST PA AND LATERAL: ICD-10-PCS | Mod: 26,,, | Performed by: RADIOLOGY

## 2023-03-27 PROCEDURE — 99999 PR PBB SHADOW E&M-EST. PATIENT-LVL III: CPT | Mod: PBBFAC,,, | Performed by: INTERNAL MEDICINE

## 2023-03-27 PROCEDURE — 93351 STRESS ECHO (CUPID ONLY): ICD-10-PCS | Mod: 26,,, | Performed by: INTERNAL MEDICINE

## 2023-03-27 PROCEDURE — 99214 PR OFFICE/OUTPT VISIT, EST, LEVL IV, 30-39 MIN: ICD-10-PCS | Mod: S$PBB,,, | Performed by: INTERNAL MEDICINE

## 2023-03-27 PROCEDURE — 99214 OFFICE O/P EST MOD 30 MIN: CPT | Mod: S$PBB,,, | Performed by: INTERNAL MEDICINE

## 2023-03-27 PROCEDURE — 71046 X-RAY EXAM CHEST 2 VIEWS: CPT | Mod: 26,,, | Performed by: RADIOLOGY

## 2023-03-27 PROCEDURE — 93351 STRESS TTE COMPLETE: CPT

## 2023-03-27 RX ORDER — LISINOPRIL 20 MG/1
20 TABLET ORAL DAILY
Qty: 90 TABLET | Refills: 3 | Status: SHIPPED | OUTPATIENT
Start: 2023-03-27 | End: 2023-09-14 | Stop reason: SDUPTHER

## 2023-03-27 NOTE — TELEPHONE ENCOUNTER
Met with patient in clinic for her 7 year annual. She was in good spirits. Reviewed her tests which showed normal stress echo and cxr. Labs were stable. Discussed her BMD results from last year; she has osteopenia and osteoporosis. She still takes aledronate 75 mg weekly. I encouraged bone strengthening exercises and to follow with her PCP for this issue. Dr. Lisa Rainey examined pt and ordered an increase in Lisinopril to 20 mg daily. I instructed her to take her BP's twice daily for one week and send to [facility].

## 2023-03-27 NOTE — PROGRESS NOTES
Advanced Heart Failure and Transplantation Clinic Follow up.        Attending Physician: Jonathon Rainey MD.  The patient's last visit with me was on 3/25/2022.         HPI.  Ms. Gonzales is a 68 y.o. year old White female who received a donation after brain death heart transplant on 3/25/16.       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 Otherwise has done really well. Today she has no complaints.    2D Echo with CFD done today  S/p cardiac transplant.  The left ventricle is normal in size with concentric remodeling and normal systolic function. The estimated ejection fraction is 65%  Normal left ventricular diastolic function.  Normal right ventricular size with normal right ventricular systolic function.  The estimated PA systolic pressure is 28 mmHg.  Normal central venous pressure (3 mmHg).       Review of Systems   Constitutional:  Negative for chills, diaphoresis and fever.   HENT:  Negative for nasal congestion, rhinorrhea and sore throat.    Eyes:  Negative for visual disturbance.   Cardiovascular:  Negative for chest pain.   Gastrointestinal:  Negative for abdominal pain, diarrhea, nausea and vomiting.   Genitourinary:  Negative for difficulty urinating, dysuria and hematuria.   Integumentary:  Negative for rash.   Neurological:  Negative for seizures, syncope and light-headedness.   Psychiatric/Behavioral:  Negative for agitation and hallucinations.       Past Medical History:   Diagnosis Date    Anticoagulant long-term use     Cardiomyopathy, dilated, nonischemic  2015    CHF (congestive heart failure)     CHF (NYHA class III, ACC/AHA stage C) 2015    Encounter for blood transfusion     Hyperlipidemia LDL goal <70     Hypertension     Hypothyroidism (acquired)     Immunodeficiency due to treatment with immunosuppressive medication     Peripartum cardiomyopathy 2015    Stroke     TIA in 2014    Unspecified hemorrhoids 2021    Dr. Yuriy Ragsdale        Past Surgical History:   Procedure Laterality Date    BIOPSY WITH ULTRASOUND GUIDANCE N/A 10/03/2018    Procedure: BIOPSY, WITH US GUIDANCE;  Surgeon: Clifford Ibarra Jr., MD;  Location: [facility] CATH LAB;  Service: Cardiology;  Laterality: N/A;    BIOPSY WITH ULTRASOUND GUIDANCE N/A 01/15/2019    Procedure: BIOPSY, WITH US GUIDANCE;  Surgeon: Darren Brunson MD;  Location: [facility] CATH LAB;  Service: Cardiology;  Laterality: N/A;    CARDIAC DEFIBRILLATOR PLACEMENT      CARDIAC DEFIBRILLATOR REMOVAL  2016    CARDIAC VALVE REPLACEMENT       SECTION      COLONOSCOPY  2021    Dr. Yuriy Ragsdale    CORONARY ANGIOGRAPHY N/A 2019    Procedure: ANGIOGRAM, CORONARY ARTERY;  Surgeon: Baldomero Jacobsen MD;  Location: [facility] CATH LAB;  Service: Cardiology;  Laterality: N/A;    heart transplanted  2016    LEFT HEART CATHETERIZATION Left 2019    Procedure: Left heart cath;  Surgeon: Baldomero Jacobsen MD;  Location: [facility] CATH LAB;  Service: Cardiology;  Laterality: Left;    LEFT HEART CATHETERIZATION Left 2021    Procedure: Left heart cath;  Surgeon: John Hendrix MD;  Location: [facility] CATH LAB;  Service: Cardiology;  Laterality: Left;    right breast tumor          Family History   Problem Relation Age of Onset    Heart disease Mother     Hypertension Mother     Cancer Father     No Known Problems Sister     No Known Problems Brother     No Known Problems Maternal Aunt     No Known Problems Maternal Uncle     No Known Problems Paternal Aunt     No Known Problems Paternal  Uncle     No Known Problems Maternal Grandmother     No Known Problems Maternal Grandfather     No Known Problems Paternal Grandmother     No Known Problems Paternal Grandfather     Anemia Neg Hx     Arrhythmia Neg Hx     Asthma Neg Hx     Clotting disorder Neg Hx     Fainting Neg Hx     Heart attack Neg Hx     Heart failure Neg Hx     Hyperlipidemia Neg Hx     Stroke Neg Hx     Atrial Septal Defect Neg Hx         Review of patient's allergies indicates:   Allergen Reactions    Sirolimus      Rash bilat arms   Rash bilat arms         Current Outpatient Medications   Medication Instructions    acetaminophen (TYLENOL) 325 mg, Oral, Every 6 hours PRN    alendronate (FOSAMAX) 70 MG tablet TAKE 1 TABLET BY MOUTH ONCE WEEKLY ON SATURDAYS    amoxicillin (AMOXIL) 500 mg, Oral, As needed (PRN), As needed for dental appointments    amoxicillin-clavulanate 500-125mg (AUGMENTIN) 500-125 mg Tab 1 tablet, Oral, 2 times daily    aspirin (ECOTRIN) 81 mg, Oral, Daily    atorvastatin (LIPITOR) 40 MG tablet TAKE 1 TABLET BY MOUTH EVERY DAY    atorvastatin (LIPITOR) 40 MG tablet TAKE 1 TABLET BY MOUTH EVERY DAY    calcium carb-vitamin D3-vit K2 500 mg calcium- 200 unit-90 mcg Tab 1 tablet, Oral, Daily    dexAMETHasone 0.5 mg/5 mL Soln 15 drops of dexamethasone 0.5mg/5mL oral solution mixed with 6mL of water.  <BR>Patient is to keep medication in mouth for 2-3 minutes, then expectorate. Repeat procedure 3-4 times a day.    everolimus (immunosuppressive) (ZORTRESS) 1 mg, Oral, 2 times daily    levothyroxine (SYNTHROID) 75 MCG tablet TAKE 1 TABLET BY MOUTH ON AN EMPTY STOMACH EVERY MORNNG    lisinopriL (PRINIVIL,ZESTRIL) 5 MG tablet TAKE 2 TABLETS BY MOUTH ONCE DAILY.    multivitamin (THERAGRAN) tablet 1 tablet, Oral, Daily    mycophenolate (CELLCEPT) 1,000 mg, Oral, 2 times daily    tacrolimus (PROGRAF) 1 mg, Oral, Every 12 hours    TOBRADEX 0.3-0.1 % Oint 1 application, Both Eyes, Nightly        There were no vitals filed for this  "visit.     Wt Readings from Last 3 Encounters:   03/27/23 59 kg (130 lb)   08/31/22 58.1 kg (128 lb)   01/26/20 58.7 kg (129 lb 6.6 oz)     Temp Readings from Last 3 Encounters:   08/31/22 99.4 °F (37.4 °C) (Oral)   04/23/21 98 °F (36.7 °C) (Temporal)   04/12/19 97.6 °F (36.4 °C) (Oral)     BP Readings from Last 3 Encounters:   03/27/23 (!) 153/74   08/31/22 (!) 167/98   01/26/20 (!) 169/99     Pulse Readings from Last 3 Encounters:   08/31/22 98   03/25/22 101   04/23/21 76        There is no height or weight on file to calculate BMI. Estimated body surface area is 1.62 meters squared as calculated from the following:    Height as of an earlier encounter on 3/27/23: 5' 3" (1.6 m).    Weight as of an earlier encounter on 3/27/23: 59 kg (130 lb).     Physical Exam  Constitutional:       Appearance: She is well-developed.   HENT:      Head: Normocephalic and atraumatic.      Right Ear: External ear normal.      Left Ear: External ear normal.   Eyes:      Conjunctiva/sclera: Conjunctivae normal.      Pupils: Pupils are equal, round, and reactive to light.   Neck:      Vascular: No JVD.   Cardiovascular:      Rate and Rhythm: Normal rate and regular rhythm.      Pulses: Intact distal pulses.      Heart sounds: S1 normal and S2 normal. No murmur heard.    No friction rub. No gallop.   Pulmonary:      Effort: Pulmonary effort is normal.      Breath sounds: Normal breath sounds.   Abdominal:      General: Bowel sounds are normal. There is no distension.      Palpations: Abdomen is soft.      Tenderness: There is no abdominal tenderness. There is no guarding or rebound.   Musculoskeletal:      Cervical back: Normal range of motion and neck supple.   Neurological:      Mental Status: She is alert and oriented to person, place, and time.        Lab Results   Component Value Date    BNP 93 03/27/2023     03/27/2023    K 4.1 03/27/2023    MG 1.7 03/27/2023     03/27/2023    CO2 25 03/27/2023    BUN 19 03/27/2023    " CREATININE 0.8 03/27/2023    GLU 88 03/27/2023    HGBA1C 5.5 03/27/2023    HGBA1C 5.3 03/24/2020    AST 28 03/27/2023    ALT 19 03/27/2023    ALBUMIN 3.8 03/27/2023    PROT 7.1 03/27/2023    BILITOT 0.5 03/27/2023    WBC 5.51 03/27/2023    HGB 11.1 (L) 03/27/2023    HCT 37.0 03/27/2023    HCT 32 (L) 03/25/2016     03/27/2023    INR 1.0 03/25/2021    TSH 0.995 03/27/2023    TSH 1.430 08/12/2020    CHOL 163 03/27/2023    HDL 71 03/27/2023    LDLCALC 72.2 03/27/2023    TRIG 99 03/27/2023    X6WRMWM 13.0 (H) 03/27/2023    FREET4 1.60 (H) 09/14/2015    TACROLIMUS 4.8 (L) 03/27/2023    ALLOMAP See result image under hyperlink 03/25/2021         Results for orders placed during the hospital encounter of 03/25/21    Echo Color Flow Doppler? Yes    Interpretation Summary  · S/p cardiac transplant.  · The left ventricle is normal in size with concentric remodeling and normal systolic function. The estimated ejection fraction is 65%  · Normal left ventricular diastolic function.  · Normal right ventricular size with normal right ventricular systolic function.  · The estimated PA systolic pressure is 28 mmHg.  · Normal central venous pressure (3 mmHg).    Patient i        Results for orders placed during the hospital encounter of 04/23/21    Cardiac catheterization    Conclusion  · The coronary arteries were normal angiographically.  · CAV detect by IVUS (Mid LAD FELIPE=0.7mm), no significant change compared to 4/12/19 study  · The pre-procedure left ventricular end diastolic pressure was 9.  · The filling pressures on the left were normal.  · The estimated blood loss was <50 mL.    The procedure log was documented by Documenter: RT Liliana and verified by John Hendrix MD.    Date: 4/23/2021  Time: 1:07 PM         Assessment and Plan:  Cardiac allograft vasculopathy    Heart transplanted    Hyperlipidemia LDL goal <70    Primary hypertension    Status post heart transplant    Immunocompromised  patient    Immunosuppression    Hypothyroidism (acquired)    Long-term use of immunosuppressant medication    Other orders  -     lisinopriL (PRINIVIL,ZESTRIL) 20 MG tablet; Take 1 tablet (20 mg total) by mouth once daily.  Dispense: 90 tablet; Refill: 3         No CV symptoms.  Normal physical exam.  Normal stress echo today.  No changes on immunosuppression.    Very hypertensive. Increase lisinopril from 10 mg to 20 mg daily. Follow up blood test Thursday or early next week.     Return instructions as set forth by post transplant schedule or as needed:     Clinic: Return for labs and/or biopsy weekly the first month, every two weeks during month 2 and then monthly for the first year at the provider or coordinator's discretion. During the second year, return to clinic every 3 months. Post transplant year 3-5 return every 6 months. There will be a comprehensive post transplant evaluation every year that may include LHC/RHC/biopsy, stress test, echo, CXR, and other health screening exams.     In addition to the clinical assessment, I have ordered Allomap testing for this patient to assist in identification of moderate/severe acute cellular rejection (ACR) in a pt with stable Allograft function instead of endomyocardial biopsy.

## 2023-03-27 NOTE — LETTER
March 27, 2023        Esha Tee  [address]  [place] [address]  Phone: [phone]  Fax: [phone]             [facility]  [address]  [facility] [address]  Phone: [phone]   Patient: Cassandra Gonzales   MR Number: [id number]   YOB: 1955   Date of Visit: 3/27/2023       Dear Dr. Esha Tee    Thank you for referring Cassandra Gonzales to me for evaluation. Attached you will find relevant portions of my assessment and plan of care.    If you have questions, please do not hesitate to call me. I look forward to following Cassandra Gonzales along with you.    Sincerely,    Jonathon Rainey MD    Enclosure    If you would like to receive this communication electronically, please contact [email or web address] or [phone] to request [employer] Link access.    [employer] Link is a tool which provides read-only access to select patient information with whom you have a relationship. Its easy to use and provides real time access to review your patients record including encounter summaries, notes, results, and demographic information.    If you feel you have received this communication in error or would no longer like to receive these types of communications, please e-mail [email or web address]

## 2023-04-06 ENCOUNTER — PATIENT MESSAGE (OUTPATIENT)
Dept: TRANSPLANT | Facility: CLINIC | Age: 68
End: 2023-04-06
Payer: COMMERCIAL

## 2023-04-06 DIAGNOSIS — Z79.899 ENCOUNTER FOR LONG-TERM (CURRENT) USE OF MEDICATIONS: ICD-10-CM

## 2023-04-06 DIAGNOSIS — T86.20 COMPLICATION OF HEART TRANSPLANT, UNSPECIFIED COMPLICATION: Primary | ICD-10-CM

## 2023-04-06 DIAGNOSIS — Z94.1 STATUS POST HEART TRANSPLANT: ICD-10-CM

## 2023-04-11 ENCOUNTER — LAB VISIT (OUTPATIENT)
Dept: LAB | Facility: HOSPITAL | Age: 68
End: 2023-04-11
Attending: INTERNAL MEDICINE
Payer: MEDICARE

## 2023-04-11 DIAGNOSIS — T86.20 COMPLICATION OF HEART TRANSPLANT, UNSPECIFIED COMPLICATION: ICD-10-CM

## 2023-04-11 DIAGNOSIS — R06.02 SHORTNESS OF BREATH: ICD-10-CM

## 2023-04-11 DIAGNOSIS — Z94.1 STATUS POST HEART TRANSPLANT: ICD-10-CM

## 2023-04-11 DIAGNOSIS — E78.2 MIXED HYPERLIPIDEMIA: ICD-10-CM

## 2023-04-11 DIAGNOSIS — Z79.899 ENCOUNTER FOR LONG-TERM (CURRENT) USE OF MEDICATIONS: ICD-10-CM

## 2023-04-11 LAB
ALBUMIN SERPL-MCNC: 3.9 G/DL (ref 3.4–4.8)
ALBUMIN/GLOB SERPL: 1.2 RATIO (ref 1.1–2)
ALP SERPL-CCNC: 51 UNIT/L (ref 40–150)
ALT SERPL-CCNC: 15 UNIT/L (ref 0–55)
AST SERPL-CCNC: 24 UNIT/L (ref 5–34)
BASOPHILS # BLD AUTO: 0.04 X10(3)/MCL (ref 0–0.2)
BASOPHILS NFR BLD AUTO: 0.9 %
BILIRUBIN DIRECT+TOT PNL SERPL-MCNC: 0.6 MG/DL
BNP BLD-MCNC: 50.1 PG/ML
BUN SERPL-MCNC: 16.7 MG/DL (ref 9.8–20.1)
CALCIUM SERPL-MCNC: 9.5 MG/DL (ref 8.4–10.2)
CHLORIDE SERPL-SCNC: 105 MMOL/L (ref 98–107)
CO2 SERPL-SCNC: 26 MMOL/L (ref 23–31)
CREAT SERPL-MCNC: 0.8 MG/DL (ref 0.55–1.02)
EOSINOPHIL # BLD AUTO: 0.15 X10(3)/MCL (ref 0–0.9)
EOSINOPHIL NFR BLD AUTO: 3.3 %
ERYTHROCYTE [DISTWIDTH] IN BLOOD BY AUTOMATED COUNT: 13.7 % (ref 11.5–17)
GFR SERPLBLD CREATININE-BSD FMLA CKD-EPI: >60 MLS/MIN/1.73/M2
GLOBULIN SER-MCNC: 3.2 GM/DL (ref 2.4–3.5)
GLUCOSE SERPL-MCNC: 81 MG/DL (ref 82–115)
HCT VFR BLD AUTO: 37.4 % (ref 37–47)
HGB BLD-MCNC: 11.6 G/DL (ref 12–16)
IMM GRANULOCYTES # BLD AUTO: 0.02 X10(3)/MCL (ref 0–0.04)
IMM GRANULOCYTES NFR BLD AUTO: 0.4 %
LYMPHOCYTES # BLD AUTO: 1.35 X10(3)/MCL (ref 0.6–4.6)
LYMPHOCYTES NFR BLD AUTO: 29.7 %
MCH RBC QN AUTO: 27.7 PG (ref 27–31)
MCHC RBC AUTO-ENTMCNC: 31 G/DL (ref 33–36)
MCV RBC AUTO: 89.3 FL (ref 80–94)
MONOCYTES # BLD AUTO: 0.68 X10(3)/MCL (ref 0.1–1.3)
MONOCYTES NFR BLD AUTO: 14.9 %
NEUTROPHILS # BLD AUTO: 2.31 X10(3)/MCL (ref 2.1–9.2)
NEUTROPHILS NFR BLD AUTO: 50.8 %
NRBC BLD AUTO-RTO: 0 %
PLATELET # BLD AUTO: 245 X10(3)/MCL (ref 130–400)
PMV BLD AUTO: 9.8 FL (ref 7.4–10.4)
POTASSIUM SERPL-SCNC: 5 MMOL/L (ref 3.5–5.1)
PROT SERPL-MCNC: 7.1 GM/DL (ref 5.8–7.6)
RBC # BLD AUTO: 4.19 X10(6)/MCL (ref 4.2–5.4)
SODIUM SERPL-SCNC: 140 MMOL/L (ref 136–145)
WBC # SPEC AUTO: 4.6 X10(3)/MCL (ref 4.5–11.5)

## 2023-04-11 PROCEDURE — 80169 DRUG ASSAY EVEROLIMUS: CPT | Mod: 90

## 2023-04-11 PROCEDURE — 83880 ASSAY OF NATRIURETIC PEPTIDE: CPT

## 2023-04-11 PROCEDURE — 80197 ASSAY OF TACROLIMUS: CPT | Mod: 90

## 2023-04-11 PROCEDURE — 36415 COLL VENOUS BLD VENIPUNCTURE: CPT

## 2023-04-11 PROCEDURE — 80053 COMPREHEN METABOLIC PANEL: CPT

## 2023-04-11 PROCEDURE — 85025 COMPLETE CBC W/AUTO DIFF WBC: CPT

## 2023-04-12 LAB
EVEROLIMUS BLD-MCNC: 3.7 NG/ML
TACROLIMUS TROUGH BLD-MCNC: 2.4 NG/ML

## 2023-04-28 RX ORDER — ALENDRONATE SODIUM 70 MG/1
TABLET ORAL
Qty: 12 TABLET | Refills: 2 | Status: SHIPPED | OUTPATIENT
Start: 2023-04-28 | End: 2024-01-01

## 2023-06-20 ENCOUNTER — PATIENT MESSAGE (OUTPATIENT)
Dept: TRANSPLANT | Facility: CLINIC | Age: 68
End: 2023-06-20
Payer: COMMERCIAL

## 2023-06-27 DIAGNOSIS — Z94.1 STATUS POST HEART TRANSPLANT: Primary | ICD-10-CM

## 2023-06-28 ENCOUNTER — LAB VISIT (OUTPATIENT)
Dept: LAB | Facility: HOSPITAL | Age: 68
End: 2023-06-28
Attending: INTERNAL MEDICINE
Payer: MEDICARE

## 2023-06-28 DIAGNOSIS — E78.2 MIXED HYPERLIPIDEMIA: ICD-10-CM

## 2023-06-28 DIAGNOSIS — Z94.1 STATUS POST HEART TRANSPLANT: ICD-10-CM

## 2023-06-28 DIAGNOSIS — T86.20 COMPLICATION OF HEART TRANSPLANT, UNSPECIFIED COMPLICATION: ICD-10-CM

## 2023-06-28 DIAGNOSIS — Z79.899 ENCOUNTER FOR LONG-TERM (CURRENT) USE OF MEDICATIONS: ICD-10-CM

## 2023-06-28 DIAGNOSIS — R06.02 SHORTNESS OF BREATH: ICD-10-CM

## 2023-06-28 LAB
ALBUMIN SERPL-MCNC: 3.9 G/DL (ref 3.4–4.8)
ALBUMIN/GLOB SERPL: 1.3 RATIO (ref 1.1–2)
ALP SERPL-CCNC: 48 UNIT/L (ref 40–150)
ALT SERPL-CCNC: 14 UNIT/L (ref 0–55)
AST SERPL-CCNC: 25 UNIT/L (ref 5–34)
BASOPHILS # BLD AUTO: 0.04 X10(3)/MCL
BASOPHILS NFR BLD AUTO: 0.8 %
BILIRUBIN DIRECT+TOT PNL SERPL-MCNC: 0.5 MG/DL
BNP BLD-MCNC: 69.6 PG/ML
BUN SERPL-MCNC: 20.3 MG/DL (ref 9.8–20.1)
CALCIUM SERPL-MCNC: 9 MG/DL (ref 8.4–10.2)
CHLORIDE SERPL-SCNC: 109 MMOL/L (ref 98–107)
CO2 SERPL-SCNC: 24 MMOL/L (ref 23–31)
CREAT SERPL-MCNC: 0.87 MG/DL (ref 0.55–1.02)
EOSINOPHIL # BLD AUTO: 0.06 X10(3)/MCL (ref 0–0.9)
EOSINOPHIL NFR BLD AUTO: 1.2 %
ERYTHROCYTE [DISTWIDTH] IN BLOOD BY AUTOMATED COUNT: 13.7 % (ref 11.5–17)
GFR SERPLBLD CREATININE-BSD FMLA CKD-EPI: >60 MLS/MIN/1.73/M2
GLOBULIN SER-MCNC: 3 GM/DL (ref 2.4–3.5)
GLUCOSE SERPL-MCNC: 89 MG/DL (ref 82–115)
HCT VFR BLD AUTO: 36.8 % (ref 37–47)
HGB BLD-MCNC: 11 G/DL (ref 12–16)
IMM GRANULOCYTES # BLD AUTO: 0.01 X10(3)/MCL (ref 0–0.04)
IMM GRANULOCYTES NFR BLD AUTO: 0.2 %
LYMPHOCYTES # BLD AUTO: 1.22 X10(3)/MCL (ref 0.6–4.6)
LYMPHOCYTES NFR BLD AUTO: 25.2 %
MAGNESIUM SERPL-MCNC: 1.8 MG/DL (ref 1.6–2.6)
MCH RBC QN AUTO: 27 PG (ref 27–31)
MCHC RBC AUTO-ENTMCNC: 29.9 G/DL (ref 33–36)
MCV RBC AUTO: 90.2 FL (ref 80–94)
MONOCYTES # BLD AUTO: 0.59 X10(3)/MCL (ref 0.1–1.3)
MONOCYTES NFR BLD AUTO: 12.2 %
NEUTROPHILS # BLD AUTO: 2.93 X10(3)/MCL (ref 2.1–9.2)
NEUTROPHILS NFR BLD AUTO: 60.4 %
NRBC BLD AUTO-RTO: 0 %
PLATELET # BLD AUTO: 210 X10(3)/MCL (ref 130–400)
PMV BLD AUTO: 9.8 FL (ref 7.4–10.4)
POTASSIUM SERPL-SCNC: 5.2 MMOL/L (ref 3.5–5.1)
PROT SERPL-MCNC: 6.9 GM/DL (ref 5.8–7.6)
RBC # BLD AUTO: 4.08 X10(6)/MCL (ref 4.2–5.4)
SODIUM SERPL-SCNC: 140 MMOL/L (ref 136–145)
WBC # SPEC AUTO: 4.85 X10(3)/MCL (ref 4.5–11.5)

## 2023-06-28 PROCEDURE — 80169 DRUG ASSAY EVEROLIMUS: CPT

## 2023-06-28 PROCEDURE — 83735 ASSAY OF MAGNESIUM: CPT

## 2023-06-28 PROCEDURE — 36415 COLL VENOUS BLD VENIPUNCTURE: CPT

## 2023-06-28 PROCEDURE — 80053 COMPREHEN METABOLIC PANEL: CPT

## 2023-06-28 PROCEDURE — 83880 ASSAY OF NATRIURETIC PEPTIDE: CPT

## 2023-06-28 PROCEDURE — 85025 COMPLETE CBC W/AUTO DIFF WBC: CPT

## 2023-06-28 PROCEDURE — 80197 ASSAY OF TACROLIMUS: CPT

## 2023-06-29 LAB
EVEROLIMUS BLD-MCNC: 4.7 NG/ML
TACROLIMUS TROUGH BLD-MCNC: 3.7 NG/ML

## 2023-07-03 ENCOUNTER — PATIENT MESSAGE (OUTPATIENT)
Dept: TRANSPLANT | Facility: CLINIC | Age: 68
End: 2023-07-03
Payer: COMMERCIAL

## 2023-09-05 ENCOUNTER — PATIENT MESSAGE (OUTPATIENT)
Dept: TRANSPLANT | Facility: CLINIC | Age: 68
End: 2023-09-05
Payer: COMMERCIAL

## 2023-09-05 DIAGNOSIS — Z94.1 STATUS POST HEART TRANSPLANT: Primary | ICD-10-CM

## 2023-09-06 DIAGNOSIS — E03.9 HYPOTHYROIDISM, UNSPECIFIED TYPE: Primary | ICD-10-CM

## 2023-09-13 NOTE — TELEPHONE ENCOUNTER
Patient called and stated that she has been having a low grade fever of 99.10 and some common cold symptoms. Returned call to ptAnnalee FLORES on  to call back when available.    Same Histology In Subsequent Stages Text: The pattern and morphology of the tumor is as described in the first stage.

## 2023-09-26 ENCOUNTER — LAB VISIT (OUTPATIENT)
Dept: LAB | Facility: HOSPITAL | Age: 68
End: 2023-09-26
Attending: INTERNAL MEDICINE
Payer: MEDICARE

## 2023-09-26 DIAGNOSIS — Z94.1 STATUS POST HEART TRANSPLANT: ICD-10-CM

## 2023-09-26 DIAGNOSIS — E03.9 HYPOTHYROIDISM, UNSPECIFIED TYPE: ICD-10-CM

## 2023-09-26 LAB
ALBUMIN SERPL-MCNC: 3.8 G/DL (ref 3.4–4.8)
ALBUMIN/GLOB SERPL: 1.3 RATIO (ref 1.1–2)
ALP SERPL-CCNC: 47 UNIT/L (ref 40–150)
ALT SERPL-CCNC: 14 UNIT/L (ref 0–55)
AST SERPL-CCNC: 24 UNIT/L (ref 5–34)
BASOPHILS # BLD AUTO: 0.04 X10(3)/MCL
BASOPHILS NFR BLD AUTO: 1 %
BILIRUB SERPL-MCNC: 0.5 MG/DL
BNP BLD-MCNC: 58.4 PG/ML
BUN SERPL-MCNC: 19 MG/DL (ref 9.8–20.1)
CALCIUM SERPL-MCNC: 9 MG/DL (ref 8.4–10.2)
CHLORIDE SERPL-SCNC: 108 MMOL/L (ref 98–107)
CO2 SERPL-SCNC: 24 MMOL/L (ref 23–31)
CREAT SERPL-MCNC: 0.85 MG/DL (ref 0.55–1.02)
EOSINOPHIL # BLD AUTO: 0.05 X10(3)/MCL (ref 0–0.9)
EOSINOPHIL NFR BLD AUTO: 1.3 %
ERYTHROCYTE [DISTWIDTH] IN BLOOD BY AUTOMATED COUNT: 13.6 % (ref 11.5–17)
GFR SERPLBLD CREATININE-BSD FMLA CKD-EPI: >60 MLS/MIN/1.73/M2
GLOBULIN SER-MCNC: 3 GM/DL (ref 2.4–3.5)
GLUCOSE SERPL-MCNC: 89 MG/DL (ref 82–115)
HCT VFR BLD AUTO: 34.3 % (ref 37–47)
HGB BLD-MCNC: 10.5 G/DL (ref 12–16)
IMM GRANULOCYTES # BLD AUTO: 0.01 X10(3)/MCL (ref 0–0.04)
IMM GRANULOCYTES NFR BLD AUTO: 0.3 %
LYMPHOCYTES # BLD AUTO: 1.22 X10(3)/MCL (ref 0.6–4.6)
LYMPHOCYTES NFR BLD AUTO: 30.7 %
MAGNESIUM SERPL-MCNC: 1.7 MG/DL (ref 1.6–2.6)
MCH RBC QN AUTO: 26.7 PG (ref 27–31)
MCHC RBC AUTO-ENTMCNC: 30.6 G/DL (ref 33–36)
MCV RBC AUTO: 87.3 FL (ref 80–94)
MONOCYTES # BLD AUTO: 0.49 X10(3)/MCL (ref 0.1–1.3)
MONOCYTES NFR BLD AUTO: 12.3 %
NEUTROPHILS # BLD AUTO: 2.16 X10(3)/MCL (ref 2.1–9.2)
NEUTROPHILS NFR BLD AUTO: 54.4 %
NRBC BLD AUTO-RTO: 0 %
PLATELET # BLD AUTO: 225 X10(3)/MCL (ref 130–400)
PMV BLD AUTO: 9.9 FL (ref 7.4–10.4)
POTASSIUM SERPL-SCNC: 4.3 MMOL/L (ref 3.5–5.1)
PROT SERPL-MCNC: 6.8 GM/DL (ref 5.8–7.6)
RBC # BLD AUTO: 3.93 X10(6)/MCL (ref 4.2–5.4)
SODIUM SERPL-SCNC: 141 MMOL/L (ref 136–145)
T4 FREE SERPL-MCNC: 1.49 NG/DL (ref 0.7–1.48)
TSH SERPL-ACNC: 1.04 UIU/ML (ref 0.35–4.94)
WBC # SPEC AUTO: 3.97 X10(3)/MCL (ref 4.5–11.5)

## 2023-09-26 PROCEDURE — 83735 ASSAY OF MAGNESIUM: CPT

## 2023-09-26 PROCEDURE — 36415 COLL VENOUS BLD VENIPUNCTURE: CPT

## 2023-09-26 PROCEDURE — 84439 ASSAY OF FREE THYROXINE: CPT

## 2023-09-26 PROCEDURE — 80197 ASSAY OF TACROLIMUS: CPT

## 2023-09-26 PROCEDURE — 83880 ASSAY OF NATRIURETIC PEPTIDE: CPT

## 2023-09-26 PROCEDURE — 80053 COMPREHEN METABOLIC PANEL: CPT

## 2023-09-26 PROCEDURE — 84443 ASSAY THYROID STIM HORMONE: CPT

## 2023-09-26 PROCEDURE — 80169 DRUG ASSAY EVEROLIMUS: CPT

## 2023-09-26 PROCEDURE — 85025 COMPLETE CBC W/AUTO DIFF WBC: CPT

## 2023-09-28 ENCOUNTER — PATIENT MESSAGE (OUTPATIENT)
Dept: TRANSPLANT | Facility: CLINIC | Age: 68
End: 2023-09-28
Payer: COMMERCIAL

## 2023-09-28 LAB
EVEROLIMUS BLD-MCNC: 4.2 NG/ML
TACROLIMUS TROUGH BLD-MCNC: 3.5 NG/ML

## 2023-11-01 ENCOUNTER — PATIENT MESSAGE (OUTPATIENT)
Dept: TRANSPLANT | Facility: CLINIC | Age: 68
End: 2023-11-01
Payer: COMMERCIAL

## 2023-11-03 NOTE — TELEPHONE ENCOUNTER
Returned call to pt . No answer regarding immunizations and upcoming appts.    Bed/Stretcher in lowest position, wheels locked, appropriate side rails in place/Call bell, personal items and telephone in reach/Instruct patient to call for assistance before getting out of bed/chair/stretcher/Non-slip footwear applied when patient is off stretcher/[place] to call system/Physically safe environment - no spills, clutter or unnecessary equipment/Purposeful proactive rounding/Room/bathroom lighting operational, light cord in reach

## 2023-12-04 ENCOUNTER — TELEPHONE (OUTPATIENT)
Dept: TRANSPLANT | Facility: CLINIC | Age: 68
End: 2023-12-04
Payer: COMMERCIAL

## 2023-12-04 ENCOUNTER — PATIENT MESSAGE (OUTPATIENT)
Dept: TRANSPLANT | Facility: CLINIC | Age: 68
End: 2023-12-04
Payer: COMMERCIAL

## 2023-12-04 DIAGNOSIS — T86.20 COMPLICATION OF HEART TRANSPLANT, UNSPECIFIED COMPLICATION: Primary | ICD-10-CM

## 2023-12-04 DIAGNOSIS — Z94.1 STATUS POST HEART TRANSPLANT: ICD-10-CM

## 2023-12-04 DIAGNOSIS — R06.02 SHORTNESS OF BREATH: ICD-10-CM

## 2023-12-04 DIAGNOSIS — Z79.899 ENCOUNTER FOR LONG-TERM (CURRENT) USE OF MEDICATIONS: ICD-10-CM

## 2023-12-04 DIAGNOSIS — E78.2 MIXED HYPERLIPIDEMIA: ICD-10-CM

## 2023-12-18 ENCOUNTER — LAB VISIT (OUTPATIENT)
Dept: LAB | Facility: HOSPITAL | Age: 68
End: 2023-12-18
Attending: INTERNAL MEDICINE
Payer: MEDICARE

## 2023-12-18 DIAGNOSIS — T86.20 COMPLICATION OF HEART TRANSPLANT, UNSPECIFIED COMPLICATION: ICD-10-CM

## 2023-12-18 DIAGNOSIS — R06.02 SHORTNESS OF BREATH: ICD-10-CM

## 2023-12-18 DIAGNOSIS — E78.2 MIXED HYPERLIPIDEMIA: ICD-10-CM

## 2023-12-18 DIAGNOSIS — Z94.1 STATUS POST HEART TRANSPLANT: ICD-10-CM

## 2023-12-18 DIAGNOSIS — Z79.899 ENCOUNTER FOR LONG-TERM (CURRENT) USE OF MEDICATIONS: ICD-10-CM

## 2023-12-18 LAB
ALBUMIN SERPL-MCNC: 3.8 G/DL (ref 3.4–4.8)
ALBUMIN/GLOB SERPL: 1.2 RATIO (ref 1.1–2)
ALP SERPL-CCNC: 62 UNIT/L (ref 40–150)
ALT SERPL-CCNC: 16 UNIT/L (ref 0–55)
AST SERPL-CCNC: 25 UNIT/L (ref 5–34)
BASOPHILS # BLD AUTO: 0.03 X10(3)/MCL
BASOPHILS NFR BLD AUTO: 0.6 %
BILIRUB SERPL-MCNC: 0.5 MG/DL
BNP BLD-MCNC: 47.3 PG/ML
BUN SERPL-MCNC: 18.2 MG/DL (ref 9.8–20.1)
CALCIUM SERPL-MCNC: 8.9 MG/DL (ref 8.4–10.2)
CHLORIDE SERPL-SCNC: 109 MMOL/L (ref 98–107)
CHOLEST SERPL-MCNC: 152 MG/DL
CHOLEST/HDLC SERPL: 2 {RATIO} (ref 0–5)
CO2 SERPL-SCNC: 26 MMOL/L (ref 23–31)
CREAT SERPL-MCNC: 0.87 MG/DL (ref 0.55–1.02)
EOSINOPHIL # BLD AUTO: 0.05 X10(3)/MCL (ref 0–0.9)
EOSINOPHIL NFR BLD AUTO: 1.1 %
ERYTHROCYTE [DISTWIDTH] IN BLOOD BY AUTOMATED COUNT: 13.7 % (ref 11.5–17)
GFR SERPLBLD CREATININE-BSD FMLA CKD-EPI: >60 MLS/MIN/1.73/M2
GLOBULIN SER-MCNC: 3.2 GM/DL (ref 2.4–3.5)
GLUCOSE SERPL-MCNC: 87 MG/DL (ref 82–115)
HCT VFR BLD AUTO: 36.4 % (ref 37–47)
HDLC SERPL-MCNC: 68 MG/DL (ref 35–60)
HGB BLD-MCNC: 11.2 G/DL (ref 12–16)
IMM GRANULOCYTES # BLD AUTO: 0.01 X10(3)/MCL (ref 0–0.04)
IMM GRANULOCYTES NFR BLD AUTO: 0.2 %
LDLC SERPL CALC-MCNC: 71 MG/DL (ref 50–140)
LYMPHOCYTES # BLD AUTO: 1.26 X10(3)/MCL (ref 0.6–4.6)
LYMPHOCYTES NFR BLD AUTO: 26.5 %
MAGNESIUM SERPL-MCNC: 1.8 MG/DL (ref 1.6–2.6)
MCH RBC QN AUTO: 27.1 PG (ref 27–31)
MCHC RBC AUTO-ENTMCNC: 30.8 G/DL (ref 33–36)
MCV RBC AUTO: 87.9 FL (ref 80–94)
MONOCYTES # BLD AUTO: 0.67 X10(3)/MCL (ref 0.1–1.3)
MONOCYTES NFR BLD AUTO: 14.1 %
NEUTROPHILS # BLD AUTO: 2.74 X10(3)/MCL (ref 2.1–9.2)
NEUTROPHILS NFR BLD AUTO: 57.5 %
NRBC BLD AUTO-RTO: 0 %
PLATELET # BLD AUTO: 244 X10(3)/MCL (ref 130–400)
PMV BLD AUTO: 10 FL (ref 7.4–10.4)
POTASSIUM SERPL-SCNC: 5 MMOL/L (ref 3.5–5.1)
PROT SERPL-MCNC: 7 GM/DL (ref 5.8–7.6)
RBC # BLD AUTO: 4.14 X10(6)/MCL (ref 4.2–5.4)
SODIUM SERPL-SCNC: 140 MMOL/L (ref 136–145)
TRIGL SERPL-MCNC: 63 MG/DL (ref 37–140)
VLDLC SERPL CALC-MCNC: 13 MG/DL
WBC # SPEC AUTO: 4.76 X10(3)/MCL (ref 4.5–11.5)

## 2023-12-18 PROCEDURE — 80169 DRUG ASSAY EVEROLIMUS: CPT

## 2023-12-18 PROCEDURE — 85025 COMPLETE CBC W/AUTO DIFF WBC: CPT

## 2023-12-18 PROCEDURE — 80197 ASSAY OF TACROLIMUS: CPT

## 2023-12-18 PROCEDURE — 80053 COMPREHEN METABOLIC PANEL: CPT

## 2023-12-18 PROCEDURE — 83880 ASSAY OF NATRIURETIC PEPTIDE: CPT

## 2023-12-18 PROCEDURE — 83735 ASSAY OF MAGNESIUM: CPT

## 2023-12-18 PROCEDURE — 80061 LIPID PANEL: CPT

## 2023-12-18 PROCEDURE — 36415 COLL VENOUS BLD VENIPUNCTURE: CPT

## 2023-12-19 ENCOUNTER — PATIENT MESSAGE (OUTPATIENT)
Dept: TRANSPLANT | Facility: CLINIC | Age: 68
End: 2023-12-19
Payer: COMMERCIAL

## 2023-12-19 LAB
EVEROLIMUS BLD-MCNC: 4.4 NG/ML
TACROLIMUS TROUGH BLD-MCNC: 3.6 NG/ML

## 2023-12-26 DIAGNOSIS — E78.2 MIXED HYPERLIPIDEMIA: ICD-10-CM

## 2023-12-26 DIAGNOSIS — E11.69 TYPE 2 DIABETES MELLITUS WITH OTHER SPECIFIED COMPLICATION, UNSPECIFIED WHETHER LONG TERM INSULIN USE: Primary | ICD-10-CM

## 2023-12-26 DIAGNOSIS — Z94.1 STATUS POST HEART TRANSPLANT: ICD-10-CM

## 2023-12-26 DIAGNOSIS — E03.9 HYPOTHYROIDISM, UNSPECIFIED TYPE: ICD-10-CM

## 2023-12-29 ENCOUNTER — PATIENT MESSAGE (OUTPATIENT)
Dept: TRANSPLANT | Facility: CLINIC | Age: 68
End: 2023-12-29
Payer: COMMERCIAL

## 2024-01-01 RX ORDER — ALENDRONATE SODIUM 70 MG/1
TABLET ORAL
Qty: 12 TABLET | Refills: 2 | Status: SHIPPED | OUTPATIENT
Start: 2024-01-01

## 2024-02-09 DIAGNOSIS — Z94.1 S/P ORTHOTOPIC HEART TRANSPLANT: ICD-10-CM

## 2024-02-10 DIAGNOSIS — E78.5 HYPERLIPIDEMIA, UNSPECIFIED HYPERLIPIDEMIA TYPE: ICD-10-CM

## 2024-02-10 RX ORDER — MYCOPHENOLATE MOFETIL 250 MG/1
1000 CAPSULE ORAL 2 TIMES DAILY
Qty: 240 CAPSULE | Refills: 11 | Status: SHIPPED | OUTPATIENT
Start: 2024-02-10 | End: 2025-02-09

## 2024-02-10 RX ORDER — TACROLIMUS 1 MG/1
1 CAPSULE ORAL EVERY 12 HOURS
Qty: 60 CAPSULE | Refills: 11 | Status: SHIPPED | OUTPATIENT
Start: 2024-02-10

## 2024-02-12 RX ORDER — ATORVASTATIN CALCIUM 40 MG/1
TABLET, FILM COATED ORAL
Qty: 90 TABLET | Refills: 3 | Status: SHIPPED | OUTPATIENT
Start: 2024-02-12

## 2024-03-11 DIAGNOSIS — Z94.1 HEART REPLACED BY TRANSPLANT: Primary | ICD-10-CM

## 2024-03-11 RX ORDER — EVEROLIMUS 0.5 MG/1
1 TABLET ORAL 2 TIMES DAILY
Qty: 360 TABLET | Refills: 3 | Status: CANCELLED | OUTPATIENT
Start: 2024-03-11 | End: 2025-03-11

## 2024-03-12 DIAGNOSIS — Z94.1 HEART REPLACED BY TRANSPLANT: Primary | ICD-10-CM

## 2024-03-12 RX ORDER — EVEROLIMUS 0.5 MG/1
1 TABLET ORAL 2 TIMES DAILY
Qty: 360 TABLET | Refills: 3 | Status: SHIPPED | OUTPATIENT
Start: 2024-03-12 | End: 2025-03-12

## 2024-03-13 ENCOUNTER — TELEPHONE (OUTPATIENT)
Dept: TRANSPLANT | Facility: CLINIC | Age: 69
End: 2024-03-13
Payer: COMMERCIAL

## 2024-03-24 NOTE — PROGRESS NOTES
Subjective:   Ms. Gonzales is a 68 y.o. year old White female who received a donation after brain death heart transplant on 3/25/16.       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 Otherwise has done really well.     Today March 25, 2024, she has no complaints.         Review of Systems   Constitutional: Negative for chills, decreased appetite, diaphoresis, fever, malaise/fatigue, weight gain and weight loss.   HENT:  Negative for ear discharge, hearing loss and sore throat.    Eyes:  Negative for blurred vision, double vision and visual disturbance.   Cardiovascular:  Negative for chest pain, orthopnea, palpitations, paroxysmal nocturnal dyspnea and syncope.   Respiratory:  Negative for cough, hemoptysis, shortness of breath and wheezing.    Endocrine: Negative for cold intolerance and heat intolerance.   Skin:  Negative for rash.   Gastrointestinal:  Negative for bloating, abdominal pain, constipation, diarrhea, hematemesis, hematochezia, jaundice, melena, nausea and vomiting.   Genitourinary:  Negative for dysuria, frequency, hematuria, nocturia and urgency.   Neurological:  Negative for dizziness, headaches and seizures.       Objective:   Last menstrual period 03/01/2007.body mass index is unknown because there is no height or weight on file.    Physical Exam  Constitutional:       Appearance: She is well-developed.   HENT:      Head: Normocephalic and atraumatic.      Right Ear: External ear normal.      Left Ear: External ear normal.   Eyes:      Conjunctiva/sclera: Conjunctivae normal.      Pupils: Pupils are equal, round, and reactive to light.   Neck:       Vascular: No hepatojugular reflux or JVD.   Cardiovascular:      Rate and Rhythm: Normal rate and regular rhythm.      Pulses: Intact distal pulses.      Heart sounds: S1 normal and S2 normal. No murmur heard.     No friction rub. No gallop.   Pulmonary:      Effort: Pulmonary effort is normal.      Breath sounds: Normal breath sounds.   Abdominal:      General: Bowel sounds are normal. There is no distension.      Palpations: Abdomen is soft.      Tenderness: There is no abdominal tenderness. There is no guarding or rebound.   Musculoskeletal:      Cervical back: Normal range of motion and neck supple.      Right lower leg: No edema.      Left lower leg: No edema.   Neurological:      Mental Status: She is alert and oriented to person, place, and time.         Lab Results   Component Value Date    WBC 4.76 12/18/2023    HGB 11.2 (L) 12/18/2023    HCT 36.4 (L) 12/18/2023    MCV 87.9 12/18/2023     12/18/2023    CHLORIDE 109 (H) 12/18/2023    CO2 26 12/18/2023    CREATININE 0.87 12/18/2023    GLUCOSE 87 12/18/2023    CALCIUM 8.9 12/18/2023    MAGNESIUM 1.7 03/24/2020    ALBUMIN 3.8 12/18/2023    AST 25 12/18/2023    BNP 47.3 12/18/2023    ALT 16 12/18/2023    ALLOMAP See result image under hyperlink 03/25/2021       Lab Results   Component Value Date    INR 1.0 03/25/2021    INR 1.0 10/27/2016    INR 1.0 10/26/2016       Natriuretic Peptide   Date Value Ref Range Status   12/18/2023 47.3 <=100.0 pg/mL Final   09/26/2023 58.4 <=100.0 pg/mL Final   06/28/2023 69.6 <=100.0 pg/mL Final     BNP   Date Value Ref Range Status   03/27/2023 93 0 - 99 pg/mL Final     Comment:     Values of less than 100 pg/ml are consistent with non-CHF populations.   03/25/2022 32 0 - 99 pg/mL Final     Comment:     Values of less than 100 pg/ml are consistent with non-CHF populations.   03/25/2021 42 0 - 99 pg/mL Final     Comment:     Values of less than 100 pg/ml are consistent with non-CHF populations.       No results found for:  ""LDH"    Tacrolimus Lvl   Date Value Ref Range Status   03/27/2023 4.8 (L) 5.0 - 15.0 ng/mL Final     Comment:     Testing performed by a chemiluminescent microparticle   immunoassay on the [email or web address] i System.    CAUTION: No firm therapeutic range exists for tacrolimus in whole   blood. The   complexity of the clinical state, individual differences in   sensitivity to   immunosuppressive and nephrotoxic effects of tacrolimus,   co-administration   of other immunosuppressants, type of transplant, time post-transplant   and a   number of other factors contribute to different requirements for   optimal   blood levels of tacrolimus. Therefore, individual tacrolimus values   cannot   be used as the sole indicator for making changes in treatment regimen   and   each patient should be thoroughly evaluated clinically before changes   in   treatment regimens are made. Each user must establish his or her own   ranges   based on clinical experience.  Therapeutic ranges vary according to the commercial test used, and   therefore   should be established for each commercial test. Values obtained with   different assay methods cannot be used interchangeably due to   differences in   assay methods and cross-reactivity with metabolites, nor should   correction   factors be applied. Therefore, consistent use of one assay for   individual   patients is recommended.       No results found for: "SIROLIMUS"  No results found for: "CYCLOSPORINE"    No results found for this or any previous visit.    No results found for this or any previous visit.      Labs were reviewed with the patient.    Assessment:     No diagnosis found.    Plan:     No CV symptoms.  Normal physical exam.  Normal stress echo today.  No changes on immunosuppression.      Return instructions as set forth by post transplant schedule or as needed:    Clinic: Return for labs and/or biopsy weekly the first month, every two weeks during month 2 and then monthly for " the first year at the provider or coordinator's discretion. During the second year, return to clinic every 3 months. Post transplant year 3-5 return every 6 months. There will be a comprehensive post transplant evaluation every year that may include LHC/RHC/biopsy, stress test, echo, CXR, and other health screening exams.    In addition to the clinical assessment, I have ordered Allomap testing for this patient to assist in identification of moderate/severe acute cellular rejection (ACR) in a pt with stable Allograft function instead of endomyocardial biopsy.     Patient is reminded to call with any health changes as these can be early signs of transplant complications. Patient is advised to make sure any new medications or changes of old medications are discussed with a pharmacist or physician knowledgeable with transplant to avoid rejection/drug toxicity related to significant drug interactions.    Patient advised that it is recommended that all transplanted patients, and their close contacts and household members receive Covid vaccination.    UNOS Patient Status  Functional Status: 100% - Normal, no complaints, no evidence of disease  Physical Capacity: No Limitations  Working for Income: yes  If yes, working activity level: Working Full Time    Advance Care Planning       Jonathon Rainey MD

## 2024-03-25 ENCOUNTER — TELEPHONE (OUTPATIENT)
Dept: TRANSPLANT | Facility: CLINIC | Age: 69
End: 2024-03-25

## 2024-03-25 ENCOUNTER — HOSPITAL ENCOUNTER (OUTPATIENT)
Dept: CARDIOLOGY | Facility: HOSPITAL | Age: 69
Discharge: HOME | End: 2024-03-25
Attending: INTERNAL MEDICINE
Payer: MEDICARE

## 2024-03-25 ENCOUNTER — OFFICE VISIT (OUTPATIENT)
Dept: TRANSPLANT | Facility: CLINIC | Age: 69
End: 2024-03-25
Payer: MEDICARE

## 2024-03-25 ENCOUNTER — HOSPITAL ENCOUNTER (OUTPATIENT)
Dept: RADIOLOGY | Facility: HOSPITAL | Age: 69
Discharge: HOME | End: 2024-03-25
Attending: INTERNAL MEDICINE
Payer: MEDICARE

## 2024-03-25 VITALS — HEIGHT: 63 IN | BODY MASS INDEX: 23.04 KG/M2 | WEIGHT: 130 LBS

## 2024-03-25 VITALS
HEIGHT: 63 IN | SYSTOLIC BLOOD PRESSURE: 127 MMHG | WEIGHT: 134.06 LBS | HEART RATE: 92 BPM | DIASTOLIC BLOOD PRESSURE: 85 MMHG | BODY MASS INDEX: 23.75 KG/M2

## 2024-03-25 DIAGNOSIS — Z94.1 STATUS POST HEART TRANSPLANT: ICD-10-CM

## 2024-03-25 DIAGNOSIS — D84.9 IMMUNOSUPPRESSION: ICD-10-CM

## 2024-03-25 DIAGNOSIS — Z94.1 HEART TRANSPLANTED: ICD-10-CM

## 2024-03-25 DIAGNOSIS — T86.290 CARDIAC ALLOGRAFT VASCULOPATHY: Primary | ICD-10-CM

## 2024-03-25 DIAGNOSIS — I10 PRIMARY HYPERTENSION: ICD-10-CM

## 2024-03-25 LAB
ASCENDING AORTA: 3.1 CM
BSA FOR ECHO PROCEDURE: 1.62 M2
CV ECHO LV RWT: 0.39 CM
CV STRESS BASE HR: 81 BPM
DIASTOLIC BLOOD PRESSURE: 106 MMHG
DOP CALC LVOT AREA: 4.3 CM2
DOP CALC LVOT DIAMETER: 2.34 CM
DOP CALC LVOT PEAK VEL: 0.99 M/S
DOP CALC LVOT STROKE VOLUME: 85.67 CM3
DOP CALCLVOT PEAK VEL VTI: 19.93 CM
E WAVE DECELERATION TIME: 178.24 MSEC
E/A RATIO: 1.82
E/E' RATIO: 10.47 M/S
ECHO LV POSTERIOR WALL: 0.87 CM (ref 0.6–1.1)
EJECTION FRACTION: 60 %
FRACTIONAL SHORTENING: 36 % (ref 28–44)
INTERVENTRICULAR SEPTUM: 1.1 CM (ref 0.6–1.1)
IVRT: 85.63 MSEC
LEFT INTERNAL DIMENSION IN SYSTOLE: 2.86 CM (ref 2.1–4)
LEFT VENTRICLE DIASTOLIC VOLUME INDEX: 56.01 ML/M2
LEFT VENTRICLE DIASTOLIC VOLUME: 90.18 ML
LEFT VENTRICLE MASS INDEX: 92 G/M2
LEFT VENTRICLE SYSTOLIC VOLUME INDEX: 19.4 ML/M2
LEFT VENTRICLE SYSTOLIC VOLUME: 31.23 ML
LEFT VENTRICULAR INTERNAL DIMENSION IN DIASTOLE: 4.45 CM (ref 3.5–6)
LEFT VENTRICULAR MASS: 147.44 G
LV LATERAL E/E' RATIO: 9.89 M/S
LV SEPTAL E/E' RATIO: 11.13 M/S
MV PEAK A VEL: 0.49 M/S
MV PEAK E VEL: 0.89 M/S
MV STENOSIS PRESSURE HALF TIME: 51.69 MS
MV VALVE AREA P 1/2 METHOD: 4.26 CM2
OHS CV CPX 1 MINUTE RECOVERY HEART RATE: 129 BPM
OHS CV CPX 85 PERCENT MAX PREDICTED HEART RATE MALE: 131
OHS CV CPX ESTIMATED METS: 11
OHS CV CPX MAX PREDICTED HEART RATE: 154
OHS CV CPX PATIENT IS FEMALE: 1
OHS CV CPX PATIENT IS MALE: 0
OHS CV CPX PEAK DIASTOLIC BLOOD PRESSURE: 88 MMHG
OHS CV CPX PEAK HEAR RATE: 148 BPM
OHS CV CPX PEAK RATE PRESSURE PRODUCT: NORMAL
OHS CV CPX PEAK SYSTOLIC BLOOD PRESSURE: 204 MMHG
OHS CV CPX PERCENT MAX PREDICTED HEART RATE ACHIEVED: 100
OHS CV CPX RATE PRESSURE PRODUCT PRESENTING: NORMAL
PISA TR MAX VEL: 2.22 M/S
RA PRESSURE ESTIMATED: 3 MMHG
RIGHT VENTRICULAR END-DIASTOLIC DIMENSION: 2.68 CM
RV TB RVSP: 5 MMHG
SINUS: 3.71 CM
STJ: 2.78 CM
STRESS ECHO POST EXERCISE DUR MIN: 6 MINUTES
STRESS ECHO POST EXERCISE DUR SEC: 51 SECONDS
SYSTOLIC BLOOD PRESSURE: 155 MMHG
TDI LATERAL: 0.09 M/S
TDI SEPTAL: 0.08 M/S
TDI: 0.09 M/S
TR MAX PG: 20 MMHG
TV REST PULMONARY ARTERY PRESSURE: 23 MMHG
Z-SCORE OF LEFT VENTRICULAR DIMENSION IN END DIASTOLE: -0.26
Z-SCORE OF LEFT VENTRICULAR DIMENSION IN END SYSTOLE: 0.08

## 2024-03-25 PROCEDURE — 99214 OFFICE O/P EST MOD 30 MIN: CPT | Mod: S$PBB,,, | Performed by: INTERNAL MEDICINE

## 2024-03-25 PROCEDURE — 71046 X-RAY EXAM CHEST 2 VIEWS: CPT | Mod: 26,,, | Performed by: INTERNAL MEDICINE

## 2024-03-25 PROCEDURE — 99999 PR PBB SHADOW E&M-EST. PATIENT-LVL III: CPT | Mod: PBBFAC,,, | Performed by: INTERNAL MEDICINE

## 2024-03-25 PROCEDURE — 93351 STRESS TTE COMPLETE: CPT | Mod: 26,,, | Performed by: INTERNAL MEDICINE

## 2024-03-25 PROCEDURE — 71046 X-RAY EXAM CHEST 2 VIEWS: CPT | Mod: TC,FY

## 2024-03-25 PROCEDURE — 99213 OFFICE O/P EST LOW 20 MIN: CPT | Mod: PBBFAC,25 | Performed by: INTERNAL MEDICINE

## 2024-03-25 PROCEDURE — 93351 STRESS TTE COMPLETE: CPT

## 2024-03-25 NOTE — LETTER
March 25, 2024        Esha Tee  [address]  [place] [address]  Phone: [phone]  Fax: [phone]             [facility]  [address]  [facility] [address]  Phone: [phone]   Patient: Cassandra Gonzales   MR Number: [id number]   YOB: 1955   Date of Visit: 3/25/2024       Dear Dr. Esha Tee    Thank you for referring Cassandra Gonzales to me for evaluation. Attached you will find relevant portions of my assessment and plan of care.    If you have questions, please do not hesitate to call me. I look forward to following Cassandra Gonzales along with you.    Sincerely,    Jonathon Rainey MD    Enclosure    If you would like to receive this communication electronically, please contact [email or web address] or [phone] to request [employer] Link access.    [employer] Link is a tool which provides read-only access to select patient information with whom you have a relationship. Its easy to use and provides real time access to review your patients record including encounter summaries, notes, results, and demographic information.    If you feel you have received this communication in error or would no longer like to receive these types of communications, please e-mail [email or web address]

## 2024-03-25 NOTE — TELEPHONE ENCOUNTER
Reason for visit:   8 years  post heart transplant clinic visit.     Immunosuppression:  TACRO 1/1  EVERO 1/1  MMF 1000/1000    Assessment: no symptoms      Education: patient overall doing well. All yearly wellness appointments complete. No complaints of chest pain, shortness of breath or swelling.     Medications, lab and echo results reviewed with patient and Dr. Belle. See MD note for orders and recommendations    Next follow up in 1 year(s). With labs in 3 months.

## 2024-04-15 ENCOUNTER — PATIENT MESSAGE (OUTPATIENT)
Dept: TRANSPLANT | Facility: CLINIC | Age: 69
End: 2024-04-15
Payer: COMMERCIAL

## 2024-05-15 ENCOUNTER — PATIENT MESSAGE (OUTPATIENT)
Dept: TRANSPLANT | Facility: CLINIC | Age: 69
End: 2024-05-15
Payer: COMMERCIAL

## 2024-06-06 ENCOUNTER — PATIENT MESSAGE (OUTPATIENT)
Dept: TRANSPLANT | Facility: CLINIC | Age: 69
End: 2024-06-06
Payer: COMMERCIAL

## 2024-06-07 DIAGNOSIS — Z94.1 STATUS POST HEART TRANSPLANT: Primary | ICD-10-CM

## 2024-06-17 ENCOUNTER — LAB VISIT (OUTPATIENT)
Dept: LAB | Facility: HOSPITAL | Age: 69
End: 2024-06-17
Attending: INTERNAL MEDICINE
Payer: MEDICARE

## 2024-06-17 DIAGNOSIS — Z94.1 STATUS POST HEART TRANSPLANT: ICD-10-CM

## 2024-06-17 LAB
ALBUMIN SERPL-MCNC: 3.5 G/DL (ref 3.4–4.8)
ALBUMIN/GLOB SERPL: 1.1 RATIO (ref 1.1–2)
ALP SERPL-CCNC: 56 UNIT/L (ref 40–150)
ALT SERPL-CCNC: 17 UNIT/L (ref 0–55)
ANION GAP SERPL CALC-SCNC: 5 MEQ/L
AST SERPL-CCNC: 27 UNIT/L (ref 5–34)
BASOPHILS # BLD AUTO: 0.02 X10(3)/MCL
BASOPHILS NFR BLD AUTO: 0.4 %
BILIRUB SERPL-MCNC: 0.4 MG/DL
BNP BLD-MCNC: 76.6 PG/ML
BUN SERPL-MCNC: 16.8 MG/DL (ref 9.8–20.1)
CALCIUM SERPL-MCNC: 8.7 MG/DL (ref 8.4–10.2)
CHLORIDE SERPL-SCNC: 108 MMOL/L (ref 98–107)
CHOLEST SERPL-MCNC: 152 MG/DL
CHOLEST/HDLC SERPL: 2 {RATIO} (ref 0–5)
CO2 SERPL-SCNC: 26 MMOL/L (ref 23–31)
CREAT SERPL-MCNC: 0.85 MG/DL (ref 0.55–1.02)
CREAT/UREA NIT SERPL: 20
EOSINOPHIL # BLD AUTO: 0.05 X10(3)/MCL (ref 0–0.9)
EOSINOPHIL NFR BLD AUTO: 0.9 %
ERYTHROCYTE [DISTWIDTH] IN BLOOD BY AUTOMATED COUNT: 13.5 % (ref 11.5–17)
GFR SERPLBLD CREATININE-BSD FMLA CKD-EPI: >60 ML/MIN/1.73/M2
GLOBULIN SER-MCNC: 3.1 GM/DL (ref 2.4–3.5)
GLUCOSE SERPL-MCNC: 83 MG/DL (ref 82–115)
HCT VFR BLD AUTO: 34.9 % (ref 37–47)
HDLC SERPL-MCNC: 63 MG/DL (ref 35–60)
HGB BLD-MCNC: 10.9 G/DL (ref 12–16)
IMM GRANULOCYTES # BLD AUTO: 0.01 X10(3)/MCL (ref 0–0.04)
IMM GRANULOCYTES NFR BLD AUTO: 0.2 %
LDLC SERPL CALC-MCNC: 77 MG/DL (ref 50–140)
LYMPHOCYTES # BLD AUTO: 1.44 X10(3)/MCL (ref 0.6–4.6)
LYMPHOCYTES NFR BLD AUTO: 27 %
MAGNESIUM SERPL-MCNC: 1.5 MG/DL (ref 1.6–2.6)
MCH RBC QN AUTO: 27.4 PG (ref 27–31)
MCHC RBC AUTO-ENTMCNC: 31.2 G/DL (ref 33–36)
MCV RBC AUTO: 87.7 FL (ref 80–94)
MONOCYTES # BLD AUTO: 0.48 X10(3)/MCL (ref 0.1–1.3)
MONOCYTES NFR BLD AUTO: 9 %
NEUTROPHILS # BLD AUTO: 3.34 X10(3)/MCL (ref 2.1–9.2)
NEUTROPHILS NFR BLD AUTO: 62.5 %
NRBC BLD AUTO-RTO: 0 %
PLATELET # BLD AUTO: 220 X10(3)/MCL (ref 130–400)
PMV BLD AUTO: 10.4 FL (ref 7.4–10.4)
POTASSIUM SERPL-SCNC: 4.5 MMOL/L (ref 3.5–5.1)
PROT SERPL-MCNC: 6.6 GM/DL (ref 5.8–7.6)
RBC # BLD AUTO: 3.98 X10(6)/MCL (ref 4.2–5.4)
SODIUM SERPL-SCNC: 139 MMOL/L (ref 136–145)
TRIGL SERPL-MCNC: 59 MG/DL (ref 37–140)
VLDLC SERPL CALC-MCNC: 12 MG/DL
WBC # BLD AUTO: 5.34 X10(3)/MCL (ref 4.5–11.5)

## 2024-06-17 PROCEDURE — 80061 LIPID PANEL: CPT

## 2024-06-17 PROCEDURE — 80169 DRUG ASSAY EVEROLIMUS: CPT

## 2024-06-17 PROCEDURE — 85025 COMPLETE CBC W/AUTO DIFF WBC: CPT

## 2024-06-17 PROCEDURE — 83735 ASSAY OF MAGNESIUM: CPT

## 2024-06-17 PROCEDURE — 36415 COLL VENOUS BLD VENIPUNCTURE: CPT

## 2024-06-17 PROCEDURE — 80197 ASSAY OF TACROLIMUS: CPT

## 2024-06-17 PROCEDURE — 83880 ASSAY OF NATRIURETIC PEPTIDE: CPT

## 2024-06-17 PROCEDURE — 80053 COMPREHEN METABOLIC PANEL: CPT

## 2024-06-19 ENCOUNTER — PATIENT MESSAGE (OUTPATIENT)
Dept: TRANSPLANT | Facility: CLINIC | Age: 69
End: 2024-06-19
Payer: COMMERCIAL

## 2024-06-19 LAB
EVEROLIMUS BLD-MCNC: 4.7 NG/ML
TACROLIMUS TROUGH BLD-MCNC: 4.1 NG/ML

## 2024-07-22 ENCOUNTER — PATIENT MESSAGE (OUTPATIENT)
Dept: TRANSPLANT | Facility: CLINIC | Age: 69
End: 2024-07-22
Payer: COMMERCIAL

## 2024-08-25 DIAGNOSIS — I15.8 HYPERTENSION ASSOCIATED WITH TRANSPLANTATION: ICD-10-CM

## 2024-08-25 DIAGNOSIS — Z94.1 HEART REPLACED BY TRANSPLANT: ICD-10-CM

## 2024-08-25 DIAGNOSIS — Z94.9 HYPERTENSION ASSOCIATED WITH TRANSPLANTATION: ICD-10-CM

## 2024-08-26 RX ORDER — LISINOPRIL 20 MG/1
20 TABLET ORAL
Qty: 90 TABLET | Refills: 3 | Status: SHIPPED | OUTPATIENT
Start: 2024-08-26

## 2024-09-13 RX ORDER — ALENDRONATE SODIUM 70 MG/1
TABLET ORAL
Qty: 12 TABLET | Refills: 2 | Status: SHIPPED | OUTPATIENT
Start: 2024-09-13

## 2024-09-19 ENCOUNTER — PATIENT MESSAGE (OUTPATIENT)
Dept: TRANSPLANT | Facility: CLINIC | Age: 69
End: 2024-09-19
Payer: COMMERCIAL

## 2024-10-25 ENCOUNTER — PATIENT MESSAGE (OUTPATIENT)
Dept: TRANSPLANT | Facility: CLINIC | Age: 69
End: 2024-10-25
Payer: COMMERCIAL

## 2024-12-18 DIAGNOSIS — Z94.1 HEART TRANSPLANTED: Primary | ICD-10-CM

## 2024-12-19 ENCOUNTER — PATIENT MESSAGE (OUTPATIENT)
Dept: TRANSPLANT | Facility: CLINIC | Age: 69
End: 2024-12-19
Payer: COMMERCIAL

## 2024-12-20 ENCOUNTER — TELEPHONE (OUTPATIENT)
Dept: TRANSPLANT | Facility: CLINIC | Age: 69
End: 2024-12-20
Payer: COMMERCIAL

## 2024-12-20 DIAGNOSIS — Z79.899 ENCOUNTER FOR LONG-TERM (CURRENT) USE OF MEDICATIONS: Primary | ICD-10-CM

## 2024-12-20 DIAGNOSIS — Z94.1 STATUS POST HEART TRANSPLANT: ICD-10-CM

## 2024-12-20 NOTE — TELEPHONE ENCOUNTER
Pt sent a [facility] message regarding her appts in March and rescheduled to 3/27/25   And routine fasting labs scheduled for 12/27/24. New orders placed.

## 2024-12-27 ENCOUNTER — LAB VISIT (OUTPATIENT)
Dept: LAB | Facility: HOSPITAL | Age: 69
End: 2024-12-27
Attending: INTERNAL MEDICINE
Payer: MEDICARE

## 2024-12-27 DIAGNOSIS — Z94.1 HEART TRANSPLANTED: ICD-10-CM

## 2024-12-27 LAB
ALBUMIN SERPL-MCNC: 3.8 G/DL (ref 3.4–4.8)
ALBUMIN/GLOB SERPL: 1.3 RATIO (ref 1.1–2)
ALP SERPL-CCNC: 58 UNIT/L (ref 40–150)
ALT SERPL-CCNC: 19 UNIT/L (ref 0–55)
ANION GAP SERPL CALC-SCNC: 6 MEQ/L
AST SERPL-CCNC: 27 UNIT/L (ref 5–34)
BASOPHILS # BLD AUTO: 0.03 X10(3)/MCL
BASOPHILS NFR BLD AUTO: 0.7 %
BILIRUB SERPL-MCNC: 0.5 MG/DL
BNP BLD-MCNC: 39 PG/ML
BUN SERPL-MCNC: 14.6 MG/DL (ref 9.8–20.1)
CALCIUM SERPL-MCNC: 8.9 MG/DL (ref 8.4–10.2)
CHLORIDE SERPL-SCNC: 108 MMOL/L (ref 98–107)
CO2 SERPL-SCNC: 25 MMOL/L (ref 23–31)
CREAT SERPL-MCNC: 0.87 MG/DL (ref 0.55–1.02)
CREAT/UREA NIT SERPL: 17
EOSINOPHIL # BLD AUTO: 0.04 X10(3)/MCL (ref 0–0.9)
EOSINOPHIL NFR BLD AUTO: 1 %
ERYTHROCYTE [DISTWIDTH] IN BLOOD BY AUTOMATED COUNT: 14.3 % (ref 11.5–17)
GFR SERPLBLD CREATININE-BSD FMLA CKD-EPI: >60 ML/MIN/1.73/M2
GLOBULIN SER-MCNC: 3 GM/DL (ref 2.4–3.5)
GLUCOSE SERPL-MCNC: 85 MG/DL (ref 82–115)
HCT VFR BLD AUTO: 34.3 % (ref 37–47)
HGB BLD-MCNC: 10.3 G/DL (ref 12–16)
IMM GRANULOCYTES # BLD AUTO: 0.01 X10(3)/MCL (ref 0–0.04)
IMM GRANULOCYTES NFR BLD AUTO: 0.2 %
LYMPHOCYTES # BLD AUTO: 1.16 X10(3)/MCL (ref 0.6–4.6)
LYMPHOCYTES NFR BLD AUTO: 27.7 %
MAGNESIUM SERPL-MCNC: 1.8 MG/DL (ref 1.6–2.6)
MCH RBC QN AUTO: 26.9 PG (ref 27–31)
MCHC RBC AUTO-ENTMCNC: 30 G/DL (ref 33–36)
MCV RBC AUTO: 89.6 FL (ref 80–94)
MONOCYTES # BLD AUTO: 0.58 X10(3)/MCL (ref 0.1–1.3)
MONOCYTES NFR BLD AUTO: 13.8 %
NEUTROPHILS # BLD AUTO: 2.37 X10(3)/MCL (ref 2.1–9.2)
NEUTROPHILS NFR BLD AUTO: 56.6 %
NRBC BLD AUTO-RTO: 0 %
PLATELET # BLD AUTO: 238 X10(3)/MCL (ref 130–400)
PMV BLD AUTO: 9.8 FL (ref 7.4–10.4)
POTASSIUM SERPL-SCNC: 5.2 MMOL/L (ref 3.5–5.1)
PROT SERPL-MCNC: 6.8 GM/DL (ref 5.8–7.6)
RBC # BLD AUTO: 3.83 X10(6)/MCL (ref 4.2–5.4)
SODIUM SERPL-SCNC: 139 MMOL/L (ref 136–145)
WBC # BLD AUTO: 4.19 X10(3)/MCL (ref 4.5–11.5)

## 2024-12-27 PROCEDURE — 80197 ASSAY OF TACROLIMUS: CPT

## 2024-12-27 PROCEDURE — 36415 COLL VENOUS BLD VENIPUNCTURE: CPT

## 2024-12-27 PROCEDURE — 83735 ASSAY OF MAGNESIUM: CPT

## 2024-12-27 PROCEDURE — 80053 COMPREHEN METABOLIC PANEL: CPT

## 2024-12-27 PROCEDURE — 83880 ASSAY OF NATRIURETIC PEPTIDE: CPT

## 2024-12-27 PROCEDURE — 85025 COMPLETE CBC W/AUTO DIFF WBC: CPT

## 2024-12-27 PROCEDURE — 80169 DRUG ASSAY EVEROLIMUS: CPT

## 2024-12-28 ENCOUNTER — TELEPHONE (OUTPATIENT)
Dept: TRANSPLANT | Facility: CLINIC | Age: 69
End: 2024-12-28
Payer: COMMERCIAL

## 2024-12-28 LAB
EVEROLIMUS BLD-MCNC: 4.4 NG/ML
TACROLIMUS TROUGH BLD-MCNC: 3.8 NG/ML

## 2024-12-28 NOTE — TELEPHONE ENCOUNTER
Sent note to pt stating labs were stable except K @ 5.2. Emphasized decrease in foods ingested. Will F/U with a repeat BMP in future.

## 2025-01-08 ENCOUNTER — PATIENT MESSAGE (OUTPATIENT)
Dept: TRANSPLANT | Facility: CLINIC | Age: 70
End: 2025-01-08
Payer: COMMERCIAL

## 2025-02-03 DIAGNOSIS — E78.5 HYPERLIPIDEMIA, UNSPECIFIED HYPERLIPIDEMIA TYPE: ICD-10-CM

## 2025-02-03 RX ORDER — ATORVASTATIN CALCIUM 40 MG/1
TABLET, FILM COATED ORAL
Qty: 90 TABLET | Refills: 3 | Status: SHIPPED | OUTPATIENT
Start: 2025-02-03

## 2025-02-10 ENCOUNTER — OFFICE VISIT (OUTPATIENT)
Dept: URGENT CARE | Facility: CLINIC | Age: 70
End: 2025-02-10
Payer: MEDICARE

## 2025-02-10 VITALS
HEART RATE: 98 BPM | SYSTOLIC BLOOD PRESSURE: 147 MMHG | RESPIRATION RATE: 17 BRPM | HEIGHT: 63 IN | OXYGEN SATURATION: 100 % | TEMPERATURE: 98 F | WEIGHT: 125 LBS | BODY MASS INDEX: 22.15 KG/M2 | DIASTOLIC BLOOD PRESSURE: 89 MMHG

## 2025-02-10 DIAGNOSIS — J02.9 SORE THROAT: Primary | ICD-10-CM

## 2025-02-10 DIAGNOSIS — J06.9 UPPER RESPIRATORY TRACT INFECTION, UNSPECIFIED TYPE: ICD-10-CM

## 2025-02-10 PROCEDURE — 99213 OFFICE O/P EST LOW 20 MIN: CPT | Mod: ,,, | Performed by: NUTRITIONIST

## 2025-02-10 RX ORDER — AZELASTINE 1 MG/ML
1 SPRAY, METERED NASAL 2 TIMES DAILY
Qty: 30 ML | Refills: 0 | Status: SHIPPED | OUTPATIENT
Start: 2025-02-10 | End: 2026-02-10

## 2025-02-10 RX ORDER — FLUTICASONE PROPIONATE 50 MCG
1 SPRAY, SUSPENSION (ML) NASAL 2 TIMES DAILY
Qty: 9.9 ML | Refills: 0 | Status: SHIPPED | OUTPATIENT
Start: 2025-02-10

## 2025-02-10 NOTE — PROGRESS NOTES
"Subjective:      Patient ID: Cassandra Gonzales is a 69 y.o. female.    Vitals:  height is 5' 3" (1.6 m) and weight is 56.7 kg (125 lb). Her temperature is 97.8 °F (36.6 °C). Her blood pressure is 147/89 (abnormal) and her pulse is 98. Her respiration is 17 and oxygen saturation is 100%.     Chief Complaint: Sinus Problem     Patient is a 69 y.o. female who presents to urgent care with complaints of sinus congestion, sore throat, slight cough x saturday.  Patient denies productive cough.  Patient denies chest pain, shortness of breath dyspnea on exertion and palpitations.  Patient states she has then taking Zyrtec.  Patient reports that she has an upcoming visit with her PCP on Friday for a general wellness visit.    Alleviating factors include none.       HENT:  Positive for congestion, postnasal drip, sinus pressure and sore throat. Negative for ear discharge, drooling, facial swelling and trouble swallowing.    Neck: Negative for neck pain and neck stiffness.   Cardiovascular:  Negative for chest pain.   Respiratory:  Negative for chest tightness, cough, sputum production, bloody sputum, shortness of breath and wheezing.    Gastrointestinal:  Negative for abdominal pain, vomiting and diarrhea.   Skin:  Negative for rash.      Objective:        Previous History      Review of patient's allergies indicates:   Allergen Reactions    Sirolimus      Rash bilat arms   Rash bilat arms        Past Medical History:   Diagnosis Date    Anticoagulant long-term use     Cardiomyopathy, dilated, nonischemic 06/29/2015    CHF (congestive heart failure)     CHF (NYHA class III, ACC/AHA stage C) 06/29/2015    Encounter for blood transfusion     Hyperlipidemia LDL goal <70     Hypertension     Hypothyroidism (acquired)     Immunodeficiency due to treatment with immunosuppressive medication     Peripartum cardiomyopathy 06/29/2015    Stroke     TIA in 12/2014    Unspecified hemorrhoids 08/20/2021    Dr. Yuriy Ragsdale "     Current Outpatient Medications   Medication Instructions    acetaminophen (TYLENOL) 325 mg, Every 6 hours PRN    alendronate (FOSAMAX) 70 MG tablet TAKE 1 TABLET BY MOUTH ONCE WEEKLY ON     amoxicillin (AMOXIL) 500 mg, As needed (PRN)    aspirin (ECOTRIN) 81 mg, Oral, Daily    atorvastatin (LIPITOR) 40 MG tablet TAKE 1 TABLET BY MOUTH EVERY DAY    calcium carb-vitamin D3-vit K2 500 mg calcium- 200 unit-90 mcg Tab 1 tablet, Daily    dexAMETHasone 0.5 mg/5 mL Soln 15 drops of dexamethasone 0.5mg/5mL oral solution mixed with 6mL of water.    Patient is to keep medication in mouth for 2-3 minutes, then expectorate. Repeat procedure 3-4 times a day.    everolimus (immunosuppressive) (ZORTRESS) 1 mg, Oral, 2 times daily    levothyroxine (SYNTHROID) 75 MCG tablet TAKE 1 TABLET BY MOUTH ON AN EMPTY STOMACH EVERY G    lisinopriL (PRINIVIL,ZESTRIL) 20 mg, Oral    multivitamin (THERAGRAN) tablet 1 tablet, Oral, Daily    mycophenolate (CELLCEPT) 1,000 mg, Oral, 2 times daily    tacrolimus (PROGRAF) 1 mg, Oral, Every 12 hours     Past Surgical History:   Procedure Laterality Date    BIOPSY WITH ULTRASOUND GUIDANCE N/A 10/03/2018    Procedure: BIOPSY, WITH US GUIDANCE;  Surgeon: Clifford Ibarra Jr., MD;  Location: [facility] CATH LAB;  Service: Cardiology;  Laterality: N/A;    BIOPSY WITH ULTRASOUND GUIDANCE N/A 01/15/2019    Procedure: BIOPSY, WITH US GUIDANCE;  Surgeon: Darren Brunson MD;  Location: [facility] CATH LAB;  Service: Cardiology;  Laterality: N/A;    CARDIAC DEFIBRILLATOR PLACEMENT      CARDIAC DEFIBRILLATOR REMOVAL  2016    CARDIAC VALVE REPLACEMENT       SECTION      COLONOSCOPY  2021    Dr. Yuriy Ragsdale    CORONARY ANGIOGRAPHY N/A 2019    Procedure: ANGIOGRAM, CORONARY ARTERY;  Surgeon: Baldomero Jacobsen MD;  Location: [facility] CATH LAB;  Service: Cardiology;  Laterality: N/A;    heart transplanted  2016    LEFT HEART CATHETERIZATION Left 2019    Procedure: Left heart  "cath;  Surgeon: Baldomero Jacobsen MD;  Location: [facility] CATH LAB;  Service: Cardiology;  Laterality: Left;    LEFT HEART CATHETERIZATION Left 04/23/2021    Procedure: Left heart cath;  Surgeon: John Hendrix MD;  Location: [facility] CATH LAB;  Service: Cardiology;  Laterality: Left;    right breast tumor       Family History   Problem Relation Name Age of Onset    Heart disease Mother      Hypertension Mother      Cancer Father      No Known Problems Sister 2     No Known Problems Brother 1     No Known Problems Maternal Aunt      No Known Problems Maternal Uncle      No Known Problems Paternal Aunt      No Known Problems Paternal Uncle      No Known Problems Maternal Grandmother      No Known Problems Maternal Grandfather      No Known Problems Paternal Grandmother      No Known Problems Paternal Grandfather      Anemia Neg Hx      Arrhythmia Neg Hx      Asthma Neg Hx      Clotting disorder Neg Hx      Fainting Neg Hx      Heart attack Neg Hx      Heart failure Neg Hx      Hyperlipidemia Neg Hx      Stroke Neg Hx      Atrial Septal Defect Neg Hx         Social History     Tobacco Use    Smoking status: Never     Passive exposure: Never    Smokeless tobacco: Never   Substance Use Topics    Alcohol use: No     Alcohol/week: 0.0 standard drinks of alcohol    Drug use: No        Physical Exam      Vital Signs Reviewed   BP (!) 147/89   Pulse 98   Temp 97.8 °F (36.6 °C)   Resp 17   Ht 5' 3" (1.6 m)   Wt 56.7 kg (125 lb)   LMP 03/01/2007   SpO2 100%   BMI 22.14 kg/m²        Procedures    Procedures     Labs     Results for orders placed or performed in visit on 12/27/24   Brain natriuretic peptide    Collection Time: 12/27/24  7:36 AM   Result Value Ref Range    Natriuretic Peptide 39.0 <=100.0 pg/mL   Comprehensive metabolic panel    Collection Time: 12/27/24  7:36 AM   Result Value Ref Range    Sodium 139 136 - 145 mmol/L    Potassium 5.2 (H) 3.5 - 5.1 mmol/L    Chloride 108 (H) 98 - 107 mmol/L    CO2 25 23 - 31 " mmol/L    Glucose 85 82 - 115 mg/dL    Blood Urea Nitrogen 14.6 9.8 - 20.1 mg/dL    Creatinine 0.87 0.55 - 1.02 mg/dL    Calcium 8.9 8.4 - 10.2 mg/dL    Protein Total 6.8 5.8 - 7.6 gm/dL    Albumin 3.8 3.4 - 4.8 g/dL    Globulin 3.0 2.4 - 3.5 gm/dL    Albumin/Globulin Ratio 1.3 1.1 - 2.0 ratio    Bilirubin Total 0.5 <=1.5 mg/dL    ALP 58 40 - 150 unit/L    ALT 19 0 - 55 unit/L    AST 27 5 - 34 unit/L    eGFR >60 mL/min/1.73/m2    Anion Gap 6.0 mEq/L    BUN/Creatinine Ratio 17    Magnesium    Collection Time: 12/27/24  7:36 AM   Result Value Ref Range    Magnesium Level 1.80 1.60 - 2.60 mg/dL   Everolimus    Collection Time: 12/27/24  7:36 AM   Result Value Ref Range    Everolimus, B 4.4 3-8 ng/mL ng/mL   CBC with Differential    Collection Time: 12/27/24  7:36 AM   Result Value Ref Range    WBC 4.19 (L) 4.50 - 11.50 x10(3)/mcL    RBC 3.83 (L) 4.20 - 5.40 x10(6)/mcL    Hgb 10.3 (L) 12.0 - 16.0 g/dL    Hct 34.3 (L) 37.0 - 47.0 %    MCV 89.6 80.0 - 94.0 fL    MCH 26.9 (L) 27.0 - 31.0 pg    MCHC 30.0 (L) 33.0 - 36.0 g/dL    RDW 14.3 11.5 - 17.0 %    Platelet 238 130 - 400 x10(3)/mcL    MPV 9.8 7.4 - 10.4 fL    Neut % 56.6 %    Lymph % 27.7 %    Mono % 13.8 %    Eos % 1.0 %    Basophil % 0.7 %    Lymph # 1.16 0.6 - 4.6 x10(3)/mcL    Neut # 2.37 2.1 - 9.2 x10(3)/mcL    Mono # 0.58 0.1 - 1.3 x10(3)/mcL    Eos # 0.04 0 - 0.9 x10(3)/mcL    Baso # 0.03 <=0.2 x10(3)/mcL    Imm Gran # 0.01 0 - 0.04 x10(3)/mcL    Imm Grans % 0.2 %    NRBC% 0.0 %   Tacrolimus ()    Collection Time: 12/27/24  7:36 AM   Result Value Ref Range    Tacrolimus 3.8 (L) 5.0-15.0 (Trough) ng/mL     *Note: Due to a large number of results and/or encounters for the requested time period, some results have not been displayed. A complete set of results can be found in Results Review.       Physical Exam   Constitutional: She appears well-developed.  Non-toxic appearance. She does not appear ill. No distress.   HENT:   Head: Atraumatic.   Ears:   Right  Ear: Tympanic membrane, external ear and ear canal normal.   Left Ear: impacted cerumen  Nose: Congestion present. No purulent discharge. Right sinus exhibits no maxillary sinus tenderness and no frontal sinus tenderness. Left sinus exhibits no maxillary sinus tenderness and no frontal sinus tenderness.   Mouth/Throat: Uvula is midline. Mucous membranes are moist. Posterior oropharyngeal erythema present.   Eyes: Right eye exhibits no discharge. Left eye exhibits no discharge. Extraocular movement intact   Neck: Neck supple. No neck rigidity present.   Cardiovascular: Normal rate, regular rhythm, normal heart sounds and normal pulses.   Pulmonary/Chest: Effort normal and breath sounds normal. No respiratory distress. She has no wheezes. She has no rhonchi. She has no rales.   Lymphadenopathy:     She has no cervical adenopathy.   Neurological: She is alert.   Skin: Skin is warm, dry and not diaphoretic.   Psychiatric: Her behavior is normal.   Nursing note and vitals reviewed.      Assessment:     1. Sore throat    2. Upper respiratory tract infection, unspecified type        Plan:   Sore throat  Sinus pressure        I will be treating you for an upper respiratory infection.  Usually URIs are viral in etiology, so we will be treating your symptoms at this time.    As discussed viral URIs usually self resolve within a week.  Since you are going to see your primary care doctor on Friday if symptoms progress bring that up at your visit.    Medication will be sent to pharmacy.  We will be treating your symptoms of sinus pressure and postnasal drip throat pain with some nasal sprays.  You will be prescribed 2 nasal sprays 1 of them will have an antihistamine component the other one will have a steroid component.    Drink plenty of fluids.     Get plenty of rest.     Warm teas, salt gargle may provide you some relief at this time.    Tylenol or Motrin as needed.     You may start an over-the-counter antihistamines such  as Zyrtec, Claritin or Allegra.    Go to the ER with any significant change or worsening of symptoms.     Follow up with your primary care doctor.     Sore throat    Upper respiratory tract infection, unspecified type

## 2025-02-10 NOTE — PATIENT INSTRUCTIONS
Sore throat  Sinus pressure        I will be treating you for an upper respiratory infection.  Usually URIs are viral in etiology, so we will be treating your symptoms at this time.    As discussed viral URIs usually self resolve within a week.  Since you are going to see your primary care doctor on Friday if symptoms progress bring that up at your visit.    Medication will be sent to pharmacy.  We will be treating your symptoms of sinus pressure and postnasal drip throat pain with some nasal sprays.  You will be prescribed 2 nasal sprays 1 of them will have an antihistamine component the other one will have a steroid component.    Drink plenty of fluids.     Get plenty of rest.     Warm teas, salt gargle may provide you some relief at this time.    Tylenol or Motrin as needed.     You may start an over-the-counter antihistamines such as Zyrtec, Claritin or Allegra.    Go to the ER with any significant change or worsening of symptoms.     Follow up with your primary care doctor.

## 2025-02-11 ENCOUNTER — PATIENT MESSAGE (OUTPATIENT)
Dept: TRANSPLANT | Facility: CLINIC | Age: 70
End: 2025-02-11
Payer: COMMERCIAL

## 2025-02-11 DIAGNOSIS — Z94.1 HEART REPLACED BY TRANSPLANT: ICD-10-CM

## 2025-02-11 DIAGNOSIS — Z94.1 S/P ORTHOTOPIC HEART TRANSPLANT: ICD-10-CM

## 2025-02-11 RX ORDER — TACROLIMUS 1 MG/1
1 CAPSULE ORAL EVERY 12 HOURS
Qty: 60 CAPSULE | Refills: 11 | Status: SHIPPED | OUTPATIENT
Start: 2025-02-11

## 2025-02-11 RX ORDER — MYCOPHENOLATE MOFETIL 250 MG/1
1000 CAPSULE ORAL 2 TIMES DAILY
Qty: 240 CAPSULE | Refills: 11 | Status: SHIPPED | OUTPATIENT
Start: 2025-02-11 | End: 2026-02-11

## 2025-02-11 RX ORDER — EVEROLIMUS 0.5 MG/1
1 TABLET ORAL 2 TIMES DAILY
Qty: 360 TABLET | Refills: 3 | Status: SHIPPED | OUTPATIENT
Start: 2025-02-11 | End: 2026-02-11

## 2025-03-27 ENCOUNTER — OFFICE VISIT (OUTPATIENT)
Dept: TRANSPLANT | Facility: CLINIC | Age: 70
End: 2025-03-27
Payer: MEDICARE

## 2025-03-27 ENCOUNTER — HOSPITAL ENCOUNTER (OUTPATIENT)
Dept: CARDIOLOGY | Facility: HOSPITAL | Age: 70
Discharge: HOME | End: 2025-03-27
Attending: INTERNAL MEDICINE
Payer: MEDICARE

## 2025-03-27 ENCOUNTER — TELEPHONE (OUTPATIENT)
Dept: TRANSPLANT | Facility: CLINIC | Age: 70
End: 2025-03-27

## 2025-03-27 ENCOUNTER — HOSPITAL ENCOUNTER (OUTPATIENT)
Dept: RADIOLOGY | Facility: HOSPITAL | Age: 70
Discharge: HOME | End: 2025-03-27
Attending: INTERNAL MEDICINE
Payer: MEDICARE

## 2025-03-27 VITALS
HEART RATE: 87 BPM | SYSTOLIC BLOOD PRESSURE: 161 MMHG | OXYGEN SATURATION: 100 % | WEIGHT: 128.31 LBS | HEIGHT: 62 IN | DIASTOLIC BLOOD PRESSURE: 88 MMHG | BODY MASS INDEX: 23.61 KG/M2

## 2025-03-27 VITALS — BODY MASS INDEX: 23 KG/M2 | HEIGHT: 62 IN | WEIGHT: 125 LBS

## 2025-03-27 DIAGNOSIS — Z94.1 HEART TRANSPLANTED: ICD-10-CM

## 2025-03-27 DIAGNOSIS — D84.9 IMMUNOSUPPRESSION: ICD-10-CM

## 2025-03-27 DIAGNOSIS — T86.290 CARDIAC ALLOGRAFT VASCULOPATHY: ICD-10-CM

## 2025-03-27 DIAGNOSIS — B25.8 OTHER CYTOMEGALOVIRAL DISEASES: ICD-10-CM

## 2025-03-27 DIAGNOSIS — E78.5 HYPERLIPIDEMIA LDL GOAL <70: ICD-10-CM

## 2025-03-27 DIAGNOSIS — Z94.1 HEART REPLACED BY TRANSPLANT: ICD-10-CM

## 2025-03-27 DIAGNOSIS — I10 PRIMARY HYPERTENSION: ICD-10-CM

## 2025-03-27 DIAGNOSIS — I15.8 HYPERTENSION ASSOCIATED WITH TRANSPLANTATION: ICD-10-CM

## 2025-03-27 DIAGNOSIS — Z94.1 STATUS POST HEART TRANSPLANT: ICD-10-CM

## 2025-03-27 DIAGNOSIS — D84.9 IMMUNOCOMPROMISED PATIENT: ICD-10-CM

## 2025-03-27 DIAGNOSIS — Z94.9 HYPERTENSION ASSOCIATED WITH TRANSPLANTATION: ICD-10-CM

## 2025-03-27 DIAGNOSIS — Z79.60 LONG-TERM USE OF IMMUNOSUPPRESSANT MEDICATION: ICD-10-CM

## 2025-03-27 DIAGNOSIS — Z94.1 HEART TRANSPLANTED: Primary | ICD-10-CM

## 2025-03-27 PROBLEM — I48.0 PAROXYSMAL ATRIAL FIBRILLATION: Status: ACTIVE | Noted: 2025-03-27

## 2025-03-27 LAB
ASCENDING AORTA: 3.19 CM
AV AREA BY CONTINUOUS VTI: 4.7 CM2
AV INDEX (PROSTH): 1.17
AV LVOT MEAN GRADIENT: 2 MMHG
AV LVOT PEAK GRADIENT: 3 MMHG
AV MEAN GRADIENT: 2 MMHG
AV PEAK GRADIENT: 3 MMHG
AV VALVE AREA BY VELOCITY RATIO: 3.7 CM²
AV VALVE AREA: 4.9 CM2
AV VELOCITY RATIO: 0.89
BSA FOR ECHO PROCEDURE: 1.57 M2
CV ECHO LV RWT: 0.59 CM
CV STRESS BASE HR: 80 BPM
DIASTOLIC BLOOD PRESSURE: 91 MMHG
DOP CALC AO PEAK VEL: 0.9 M/S
DOP CALC AO VTI: 14.9 CM
DOP CALC LVOT AREA: 4.2 CM2
DOP CALC LVOT DIAMETER: 2.3 CM
DOP CALC LVOT PEAK VEL: 0.8 M/S
DOP CALC LVOT STROKE VOLUME: 72.7 CM3
DOP CALCLVOT PEAK VEL VTI: 17.5 CM
E WAVE DECELERATION TIME: 147 MS
E/A RATIO: 1.66
E/E' RATIO: 11 M/S
ECHO EF ESTIMATED: 78 %
ECHO LV POSTERIOR WALL: 1.3 CM (ref 0.6–1.1)
FRACTIONAL SHORTENING: 34.1 % (ref 28–44)
INTERVENTRICULAR SEPTUM: 0.9 CM (ref 0.6–1.1)
LEFT ATRIUM SIZE: 3.3 CM
LEFT INTERNAL DIMENSION IN SYSTOLE: 2.9 CM (ref 2.1–4)
LEFT VENTRICLE DIASTOLIC VOLUME INDEX: 47.13 ML/M2
LEFT VENTRICLE DIASTOLIC VOLUME: 74 ML
LEFT VENTRICLE MASS INDEX: 107.6 G/M2
LEFT VENTRICLE SYSTOLIC VOLUME INDEX: 10.8 ML/M2
LEFT VENTRICLE SYSTOLIC VOLUME: 17 ML
LEFT VENTRICULAR INTERNAL DIMENSION IN DIASTOLE: 4.4 CM (ref 3.5–6)
LEFT VENTRICULAR MASS: 168.9 G
LV LATERAL E/E' RATIO: 8.7
LV SEPTAL E/E' RATIO: 15.6
MV PEAK A VEL: 0.47 M/S
MV PEAK E VEL: 0.78 M/S
OHS CV CPX 1 MINUTE RECOVERY HEART RATE: 121 BPM
OHS CV CPX 85 PERCENT MAX PREDICTED HEART RATE MALE: 129
OHS CV CPX ESTIMATED METS: 10
OHS CV CPX MAX PREDICTED HEART RATE: 152
OHS CV CPX PATIENT IS FEMALE: 1
OHS CV CPX PATIENT IS MALE: 0
OHS CV CPX PEAK DIASTOLIC BLOOD PRESSURE: 94 MMHG
OHS CV CPX PEAK HEAR RATE: 137 BPM
OHS CV CPX PEAK RATE PRESSURE PRODUCT: ABNORMAL
OHS CV CPX PEAK SYSTOLIC BLOOD PRESSURE: 204 MMHG
OHS CV CPX PERCENT MAX PREDICTED HEART RATE ACHIEVED: 94
OHS CV CPX RATE PRESSURE PRODUCT PRESENTING: ABNORMAL
OHS CV RV/LV RATIO: 0.8 CM
POST STRESS EJECTION FRACTION: 75 %
RA PRESSURE ESTIMATED: 3 MMHG
RIGHT VENTRICLE DIASTOLIC BASEL DIMENSION: 3.5 CM
RV TISSUE DOPPLER FREE WALL SYSTOLIC VELOCITY 1 (APICAL 4 CHAMBER VIEW): 5.91 CM/S
SINUS: 3.66 CM
STJ: 3 CM
STRESS ECHO POST EXERCISE DUR MIN: 6 MINUTES
STRESS ECHO POST EXERCISE DUR SEC: 28 SECONDS
SYSTOLIC BLOOD PRESSURE: 151 MMHG
TDI LATERAL: 0.09 M/S
TDI SEPTAL: 0.05 M/S
TDI: 0.07 M/S
TRICUSPID ANNULAR PLANE SYSTOLIC EXCURSION: 1.1 CM
Z-SCORE OF LEFT VENTRICULAR DIMENSION IN END DIASTOLE: -0.25
Z-SCORE OF LEFT VENTRICULAR DIMENSION IN END SYSTOLE: 0.28

## 2025-03-27 PROCEDURE — 99999 PR PBB SHADOW E&M-EST. PATIENT-LVL III: CPT | Mod: PBBFAC,,, | Performed by: INTERNAL MEDICINE

## 2025-03-27 PROCEDURE — 99213 OFFICE O/P EST LOW 20 MIN: CPT | Mod: PBBFAC,25 | Performed by: INTERNAL MEDICINE

## 2025-03-27 PROCEDURE — 93351 STRESS TTE COMPLETE: CPT | Mod: 26,,, | Performed by: INTERNAL MEDICINE

## 2025-03-27 PROCEDURE — 71046 X-RAY EXAM CHEST 2 VIEWS: CPT | Mod: TC,FY

## 2025-03-27 PROCEDURE — 93351 STRESS TTE COMPLETE: CPT

## 2025-03-27 PROCEDURE — 71046 X-RAY EXAM CHEST 2 VIEWS: CPT | Mod: 26,,, | Performed by: RADIOLOGY

## 2025-03-27 NOTE — TELEPHONE ENCOUNTER
Reason for visit:  9 years post heart transplant clinic visit.     Immunosuppression:  Tacrolimus 1 mg bid  Everolimus 2 mg bid  MMF 1000 MG BID    Assessment: no symptoms, denies Fever/Chills, N/V/D or SOB. Walked into clinic.     Psychosocial Evaluation:  -physical activity:Walking and exercising by doing the stairs at home and at Work.   -work: Yes  -transportation:POV   -support:Friends and her son and his family   Who live in [facility]  -issues/concerns: None    Education:Preventive care, screening for Cancer  Medications, lab and echo results reviewed with patient and Dr. Abrue. See MD note for orders and recommendations    Next follow up in One Year for her 10 year annual

## 2025-03-27 NOTE — PROGRESS NOTES
"Subjective:   Ms. Gonzales is a 70 y.o. year old White female who received a donation after brain death heart transplant on 3/25/16.      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 Otherwise has done really well.     Comes in today for her annual visit.  Overall doing very well.  Working as a  at a law firm.  With day-to-day activities denies any cardiovascular complaints.  The only issue is that her blood pressure is elevated in clinic, and she does maintain a log of her blood pressure at home.  While she does not bring it in, she says that typically over the last few months she has been noticing higher than usual numbers with her systolics in the 140s.    ROS    Objective:   Blood pressure (!) 161/88, pulse 87, height 5' 2" (1.575 m), weight 58.2 kg (128 lb 4.9 oz), last menstrual period 03/01/2007, SpO2 100%.body mass index is 23.47 kg/m².    Physical Exam  HENT:      Head: Atraumatic.   Eyes:      Extraocular Movements: Extraocular movements intact.   Cardiovascular:      Rate and Rhythm: Normal rate and regular rhythm.      Pulses: Normal pulses.      Heart sounds: Normal heart sounds.   Pulmonary:      Breath sounds: Normal breath sounds.   Abdominal:      Palpations: Abdomen is soft.      Tenderness: There is no abdominal tenderness.   Musculoskeletal:         General: Normal range of motion.      Right lower leg: No edema.      Left lower leg: No edema.   Neurological:      General: No focal deficit present.      Mental Status: She is alert and oriented to person, place, and time.         Lab Results   Component Value Date    WBC 4.19 (L) 12/27/2024    HGB 10.3 (L) " "12/27/2024    HCT 34.3 (L) 12/27/2024    MCV 89.6 12/27/2024     12/27/2024    CO2 25 12/27/2024    CREATININE 0.87 12/27/2024    GLUCOSE 85 12/27/2024    CALCIUM 8.9 12/27/2024    MAGNESIUM 1.7 03/24/2020    ALBUMIN 3.8 12/27/2024    AST 27 12/27/2024    BNP 39.0 12/27/2024    ALT 19 12/27/2024    ALLOMAP See result image under hyperlink 03/25/2021       Lab Results   Component Value Date    INR 1.0 03/25/2021    INR 1.0 10/27/2016    INR 1.0 10/26/2016       BNP   Date Value Ref Range Status   03/25/2024 47 0 - 99 pg/mL Final     Comment:     Values of less than 100 pg/ml are consistent with non-CHF populations.   03/27/2023 93 0 - 99 pg/mL Final     Comment:     Values of less than 100 pg/ml are consistent with non-CHF populations.   03/25/2022 32 0 - 99 pg/mL Final     Comment:     Values of less than 100 pg/ml are consistent with non-CHF populations.     Natriuretic Peptide   Date Value Ref Range Status   12/27/2024 39.0 <=100.0 pg/mL Final   06/17/2024 76.6 <=100.0 pg/mL Final   12/18/2023 47.3 <=100.0 pg/mL Final       No results found for: "LDH"    Tacrolimus Lvl   Date Value Ref Range Status   03/25/2024 4.0 (L) 5.0 - 15.0 ng/mL Final     Comment:     Testing performed by a chemiluminescent microparticle   immunoassay on the [employer] i System.    CAUTION: No firm therapeutic range exists for tacrolimus in whole   blood. The   complexity of the clinical state, individual differences in   sensitivity to   immunosuppressive and nephrotoxic effects of tacrolimus,   co-administration   of other immunosuppressants, type of transplant, time post-transplant   and a   number of other factors contribute to different requirements for   optimal   blood levels of tacrolimus. Therefore, individual tacrolimus values   cannot   be used as the sole indicator for making changes in treatment regimen   and   each patient should be thoroughly evaluated clinically before changes   in   treatment regimens are made. " "Each user must establish his or her own   ranges   based on clinical experience.  Therapeutic ranges vary according to the commercial test used, and   therefore   should be established for each commercial test. Values obtained with   different assay methods cannot be used interchangeably due to   differences in   assay methods and cross-reactivity with metabolites, nor should   correction   factors be applied. Therefore, consistent use of one assay for   individual   patients is recommended.       No results found for: "SIROLIMUS"  No results found for: "CYCLOSPORINE"    No results found for this or any previous visit.    No results found for this or any previous visit.      Labs were reviewed with the patient.    Assessment:     1. Heart transplanted    2. Heart replaced by transplant    3. Hypertension associated with transplantation    4. Primary hypertension    5. Status post heart transplant    6. Cardiac allograft vasculopathy    7. Hyperlipidemia LDL goal <70    8. Other cytomegaloviral diseases    9. Immunosuppression    10. Immunocompromised patient    11. Long-term use of immunosuppressant medication        Plan:     She comes in today for follow-up.  Overall doing well.  Is scheduled for a stress echocardiogram later today, labs are still pending.  We will increase her lisinopril from 20 mg daily to 40 mg daily given her hypertension.  Continue other meds unchanged at this time.      Return instructions as set forth by post transplant schedule or as needed:    Clinic: Return for labs and/or biopsy weekly the first month, every two weeks during month 2 and then monthly for the first year at the provider or coordinator's discretion. During the second year, return to clinic every 3 months. Post transplant year 3-5 return every 6 months. There will be a comprehensive post transplant evaluation every year that may include LHC/RHC/biopsy, stress test, echo, CXR, and other health screening exams.    In addition " to the clinical assessment, I have ordered Allomap testing for this patient to assist in identification of moderate/severe acute cellular rejection (ACR) in a pt with stable Allograft function instead of endomyocardial biopsy.     Patient is reminded to call with any health changes as these can be early signs of transplant complications. Patient is advised to make sure any new medications or changes of old medications are discussed with a pharmacist or physician knowledgeable with transplant to avoid rejection/drug toxicity related to significant drug interactions.    Patient advised that it is recommended that all transplanted patients, and their close contacts and household members receive Covid vaccination.    UNOS Patient Status  Functional Status: 90% - Able to carry on normal activity: minor symptoms of disease  Physical Capacity: No Limitations  Working for Income: yes  If yes, working activity level: Working, Part Time vs. Full Time Unknown    Advance Care Planning     Date: 03/27/2025    Power of   I initiated the process of voluntary advance care planning today and explained the importance of this process to the patient.  I introduced the concept of advance directives to the patient, as well. Then the patient received detailed information about the importance of designating a Health Care Power of  (HCPOA). She was also instructed to communicate with this person about their wishes for future healthcare, should she become sick and lose decision-making capacity. The patient has not previously appointed a HCPOA. After our discussion, the patient has decided to complete a HCPOA. I encouraged her to communicate with this person about their wishes for future healthcare, should she become sick and lose decision-making capacity.      A total of 15 min was spent on advance care planning, goals of care discussion, emotional support, formulating and communicating prognosis and exploring burden/benefit  of various approaches of treatment. This discussion occurred on a fully voluntary basis with the verbal consent of the patient and/or family.       Patrick Abreu MD

## 2025-03-27 NOTE — LETTER
March 27, 2025        Esha Tee  [address]  [address]  [place] [address]  Phone: [phone]  Fax: [phone]             [id number]  [address]  [facility] [address]  Phone: [phone]   Patient: Cassandra Gonzales   MR Number: [id number]   YOB: 1955   Date of Visit: 3/27/2025       Dear Dr. Esha Tee    Thank you for referring Cassandra Gonzales to me for evaluation. Attached you will find relevant portions of my assessment and plan of care.    If you have questions, please do not hesitate to call me. I look forward to following Cassandra Gonzales along with you.    Sincerely,    Patrick Abreu MD    Enclosure    If you would like to receive this communication electronically, please contact [email or web address] or [phone] to request [employer] Link access.    [employer] Link is a tool which provides read-only access to select patient information with whom you have a relationship. Its easy to use and provides real time access to review your patients record including encounter summaries, notes, results, and demographic information.    If you feel you have received this communication in error or would no longer like to receive these types of communications, please e-mail [email or web address]

## 2025-03-28 ENCOUNTER — PATIENT MESSAGE (OUTPATIENT)
Dept: TRANSPLANT | Facility: CLINIC | Age: 70
End: 2025-03-28
Payer: MEDICARE

## 2025-03-28 ENCOUNTER — LAB VISIT (OUTPATIENT)
Dept: LAB | Facility: HOSPITAL | Age: 70
End: 2025-03-28
Attending: INTERNAL MEDICINE
Payer: MEDICARE

## 2025-03-28 DIAGNOSIS — Z94.1 STATUS POST HEART TRANSPLANT: ICD-10-CM

## 2025-03-28 DIAGNOSIS — Z79.899 ENCOUNTER FOR LONG-TERM (CURRENT) USE OF MEDICATIONS: ICD-10-CM

## 2025-03-28 LAB
ALBUMIN SERPL-MCNC: 3.7 G/DL (ref 3.4–4.8)
ALBUMIN/GLOB SERPL: 1.1 RATIO (ref 1.1–2)
ALP SERPL-CCNC: 58 UNIT/L (ref 40–150)
ALT SERPL-CCNC: 12 UNIT/L (ref 0–55)
ANION GAP SERPL CALC-SCNC: 7 MEQ/L
AST SERPL-CCNC: 23 UNIT/L (ref 11–45)
BASOPHILS # BLD AUTO: 0.03 X10(3)/MCL
BASOPHILS NFR BLD AUTO: 0.8 %
BILIRUB SERPL-MCNC: 0.5 MG/DL
BNP BLD-MCNC: 77.7 PG/ML
BUN SERPL-MCNC: 15.4 MG/DL (ref 9.8–20.1)
CALCIUM SERPL-MCNC: 8.9 MG/DL (ref 8.4–10.2)
CHLORIDE SERPL-SCNC: 106 MMOL/L (ref 98–107)
CHOLEST SERPL-MCNC: 153 MG/DL
CHOLEST/HDLC SERPL: 2 {RATIO} (ref 0–5)
CO2 SERPL-SCNC: 27 MMOL/L (ref 23–31)
CREAT SERPL-MCNC: 0.81 MG/DL (ref 0.55–1.02)
CREAT/UREA NIT SERPL: 19
EOSINOPHIL # BLD AUTO: 0.05 X10(3)/MCL (ref 0–0.9)
EOSINOPHIL NFR BLD AUTO: 1.3 %
ERYTHROCYTE [DISTWIDTH] IN BLOOD BY AUTOMATED COUNT: 14 % (ref 11.5–17)
EST. AVERAGE GLUCOSE BLD GHB EST-MCNC: 116.9 MG/DL
GFR SERPLBLD CREATININE-BSD FMLA CKD-EPI: >60 ML/MIN/1.73/M2
GLOBULIN SER-MCNC: 3.5 GM/DL (ref 2.4–3.5)
GLUCOSE SERPL-MCNC: 82 MG/DL (ref 82–115)
HBA1C MFR BLD: 5.7 %
HCT VFR BLD AUTO: 33.5 % (ref 37–47)
HDLC SERPL-MCNC: 69 MG/DL (ref 35–60)
HGB BLD-MCNC: 10.3 G/DL (ref 12–16)
IMM GRANULOCYTES # BLD AUTO: 0.01 X10(3)/MCL (ref 0–0.04)
IMM GRANULOCYTES NFR BLD AUTO: 0.3 %
LDLC SERPL CALC-MCNC: 71 MG/DL (ref 50–140)
LYMPHOCYTES # BLD AUTO: 1.32 X10(3)/MCL (ref 0.6–4.6)
LYMPHOCYTES NFR BLD AUTO: 34.8 %
MAGNESIUM SERPL-MCNC: 1.7 MG/DL (ref 1.6–2.6)
MCH RBC QN AUTO: 26.7 PG (ref 27–31)
MCHC RBC AUTO-ENTMCNC: 30.7 G/DL (ref 33–36)
MCV RBC AUTO: 86.8 FL (ref 80–94)
MONOCYTES # BLD AUTO: 0.47 X10(3)/MCL (ref 0.1–1.3)
MONOCYTES NFR BLD AUTO: 12.4 %
NEUTROPHILS # BLD AUTO: 1.91 X10(3)/MCL (ref 2.1–9.2)
NEUTROPHILS NFR BLD AUTO: 50.4 %
NRBC BLD AUTO-RTO: 0 %
PLATELET # BLD AUTO: 219 X10(3)/MCL (ref 130–400)
PMV BLD AUTO: 9.8 FL (ref 7.4–10.4)
POTASSIUM SERPL-SCNC: 4.5 MMOL/L (ref 3.5–5.1)
PROT SERPL-MCNC: 7.2 GM/DL (ref 5.8–7.6)
RBC # BLD AUTO: 3.86 X10(6)/MCL (ref 4.2–5.4)
SODIUM SERPL-SCNC: 140 MMOL/L (ref 136–145)
TRIGL SERPL-MCNC: 67 MG/DL (ref 37–140)
TSH SERPL-ACNC: 1.08 UIU/ML (ref 0.35–4.94)
VLDLC SERPL CALC-MCNC: 13 MG/DL
WBC # BLD AUTO: 3.79 X10(3)/MCL (ref 4.5–11.5)

## 2025-03-28 PROCEDURE — 85025 COMPLETE CBC W/AUTO DIFF WBC: CPT

## 2025-03-28 PROCEDURE — 83036 HEMOGLOBIN GLYCOSYLATED A1C: CPT

## 2025-03-28 PROCEDURE — 84443 ASSAY THYROID STIM HORMONE: CPT

## 2025-03-28 PROCEDURE — 80169 DRUG ASSAY EVEROLIMUS: CPT

## 2025-03-28 PROCEDURE — 80053 COMPREHEN METABOLIC PANEL: CPT

## 2025-03-28 PROCEDURE — 80197 ASSAY OF TACROLIMUS: CPT

## 2025-03-28 PROCEDURE — 83880 ASSAY OF NATRIURETIC PEPTIDE: CPT

## 2025-03-28 PROCEDURE — 80061 LIPID PANEL: CPT

## 2025-03-28 PROCEDURE — 83735 ASSAY OF MAGNESIUM: CPT

## 2025-03-28 PROCEDURE — 36415 COLL VENOUS BLD VENIPUNCTURE: CPT

## 2025-03-30 LAB
EVEROLIMUS BLD-MCNC: 4.6 NG/ML
TACROLIMUS TROUGH BLD-MCNC: 4.1 NG/ML

## 2025-03-31 ENCOUNTER — RESULTS FOLLOW-UP (OUTPATIENT)
Dept: TRANSPLANT | Facility: CLINIC | Age: 70
End: 2025-03-31

## 2025-04-01 ENCOUNTER — PATIENT MESSAGE (OUTPATIENT)
Dept: TRANSPLANT | Facility: CLINIC | Age: 70
End: 2025-04-01
Payer: MEDICARE

## 2025-04-08 ENCOUNTER — PATIENT MESSAGE (OUTPATIENT)
Dept: TRANSPLANT | Facility: CLINIC | Age: 70
End: 2025-04-08
Payer: MEDICARE

## 2025-04-08 ENCOUNTER — LAB VISIT (OUTPATIENT)
Dept: LAB | Facility: HOSPITAL | Age: 70
End: 2025-04-08
Attending: INTERNAL MEDICINE
Payer: MEDICARE

## 2025-04-08 DIAGNOSIS — Z94.1 STATUS POST HEART TRANSPLANT: ICD-10-CM

## 2025-04-08 DIAGNOSIS — Z79.899 ENCOUNTER FOR LONG-TERM (CURRENT) USE OF MEDICATIONS: ICD-10-CM

## 2025-04-08 LAB
ALBUMIN SERPL-MCNC: 3.8 G/DL (ref 3.4–4.8)
ALBUMIN/GLOB SERPL: 1.1 RATIO (ref 1.1–2)
ALP SERPL-CCNC: 57 UNIT/L (ref 40–150)
ALT SERPL-CCNC: 12 UNIT/L (ref 0–55)
ANION GAP SERPL CALC-SCNC: 8 MEQ/L
AST SERPL-CCNC: 22 UNIT/L (ref 11–45)
BASOPHILS # BLD AUTO: 0.02 X10(3)/MCL
BASOPHILS NFR BLD AUTO: 0.4 %
BILIRUB SERPL-MCNC: 0.6 MG/DL
BUN SERPL-MCNC: 18.9 MG/DL (ref 9.8–20.1)
CALCIUM SERPL-MCNC: 9.2 MG/DL (ref 8.4–10.2)
CHLORIDE SERPL-SCNC: 109 MMOL/L (ref 98–107)
CO2 SERPL-SCNC: 24 MMOL/L (ref 23–31)
CREAT SERPL-MCNC: 0.78 MG/DL (ref 0.55–1.02)
CREAT/UREA NIT SERPL: 24
EOSINOPHIL # BLD AUTO: 0.05 X10(3)/MCL (ref 0–0.9)
EOSINOPHIL NFR BLD AUTO: 1.1 %
ERYTHROCYTE [DISTWIDTH] IN BLOOD BY AUTOMATED COUNT: 14 % (ref 11.5–17)
GFR SERPLBLD CREATININE-BSD FMLA CKD-EPI: >60 ML/MIN/1.73/M2
GLOBULIN SER-MCNC: 3.6 GM/DL (ref 2.4–3.5)
GLUCOSE SERPL-MCNC: 83 MG/DL (ref 82–115)
HCT VFR BLD AUTO: 36.6 % (ref 37–47)
HGB BLD-MCNC: 10.9 G/DL (ref 12–16)
IMM GRANULOCYTES # BLD AUTO: 0.01 X10(3)/MCL (ref 0–0.04)
IMM GRANULOCYTES NFR BLD AUTO: 0.2 %
LYMPHOCYTES # BLD AUTO: 1.13 X10(3)/MCL (ref 0.6–4.6)
LYMPHOCYTES NFR BLD AUTO: 24.6 %
MCH RBC QN AUTO: 26.3 PG (ref 27–31)
MCHC RBC AUTO-ENTMCNC: 29.8 G/DL (ref 33–36)
MCV RBC AUTO: 88.4 FL (ref 80–94)
MONOCYTES # BLD AUTO: 0.59 X10(3)/MCL (ref 0.1–1.3)
MONOCYTES NFR BLD AUTO: 12.8 %
NEUTROPHILS # BLD AUTO: 2.8 X10(3)/MCL (ref 2.1–9.2)
NEUTROPHILS NFR BLD AUTO: 60.9 %
NRBC BLD AUTO-RTO: 0 %
PLATELET # BLD AUTO: 227 X10(3)/MCL (ref 130–400)
PMV BLD AUTO: 9.8 FL (ref 7.4–10.4)
POTASSIUM SERPL-SCNC: 5.1 MMOL/L (ref 3.5–5.1)
PROT SERPL-MCNC: 7.4 GM/DL (ref 5.8–7.6)
RBC # BLD AUTO: 4.14 X10(6)/MCL (ref 4.2–5.4)
SODIUM SERPL-SCNC: 141 MMOL/L (ref 136–145)
WBC # BLD AUTO: 4.6 X10(3)/MCL (ref 4.5–11.5)

## 2025-04-08 PROCEDURE — 80169 DRUG ASSAY EVEROLIMUS: CPT

## 2025-04-08 PROCEDURE — 36415 COLL VENOUS BLD VENIPUNCTURE: CPT

## 2025-04-08 PROCEDURE — 85025 COMPLETE CBC W/AUTO DIFF WBC: CPT

## 2025-04-08 PROCEDURE — 80053 COMPREHEN METABOLIC PANEL: CPT

## 2025-04-10 DIAGNOSIS — Z94.1 HEART REPLACED BY TRANSPLANT: ICD-10-CM

## 2025-04-10 DIAGNOSIS — Z94.1 S/P ORTHOTOPIC HEART TRANSPLANT: ICD-10-CM

## 2025-04-10 LAB — EVEROLIMUS BLD-MCNC: 4.1 NG/ML

## 2025-04-11 ENCOUNTER — TELEPHONE (OUTPATIENT)
Dept: TRANSPLANT | Facility: CLINIC | Age: 70
End: 2025-04-11
Payer: MEDICARE

## 2025-04-11 RX ORDER — EVEROLIMUS 0.5 MG/1
1 TABLET ORAL 2 TIMES DAILY
Qty: 360 TABLET | Refills: 3 | Status: SHIPPED | OUTPATIENT
Start: 2025-04-11 | End: 2026-04-11

## 2025-04-11 RX ORDER — TACROLIMUS 1 MG/1
1 CAPSULE ORAL EVERY 12 HOURS
Qty: 60 CAPSULE | Refills: 11 | Status: SHIPPED | OUTPATIENT
Start: 2025-04-11

## 2025-04-11 RX ORDER — MYCOPHENOLATE MOFETIL 250 MG/1
1000 CAPSULE ORAL 2 TIMES DAILY
Qty: 240 CAPSULE | Refills: 11 | Status: SHIPPED | OUTPATIENT
Start: 2025-04-11 | End: 2026-04-11

## 2025-04-11 NOTE — TELEPHONE ENCOUNTER
Left a voicemail regarding recent lab work and blood pressure records, asked that she continue to monitor BP

## 2025-04-24 ENCOUNTER — PATIENT MESSAGE (OUTPATIENT)
Dept: TRANSPLANT | Facility: CLINIC | Age: 70
End: 2025-04-24
Payer: MEDICARE

## 2025-05-08 ENCOUNTER — PATIENT MESSAGE (OUTPATIENT)
Dept: TRANSPLANT | Facility: CLINIC | Age: 70
End: 2025-05-08
Payer: MEDICARE

## 2025-05-08 DIAGNOSIS — Z94.1 S/P ORTHOTOPIC HEART TRANSPLANT: ICD-10-CM

## 2025-05-08 DIAGNOSIS — Z94.1 HEART REPLACED BY TRANSPLANT: ICD-10-CM

## 2025-05-08 RX ORDER — TACROLIMUS 1 MG/1
1 CAPSULE ORAL EVERY 12 HOURS
Qty: 60 CAPSULE | Refills: 11 | Status: SHIPPED | OUTPATIENT
Start: 2025-05-08

## 2025-05-08 RX ORDER — EVEROLIMUS 0.5 MG/1
1 TABLET ORAL 2 TIMES DAILY
Qty: 360 TABLET | Refills: 3 | Status: SHIPPED | OUTPATIENT
Start: 2025-05-08 | End: 2026-05-08

## 2025-05-08 RX ORDER — MYCOPHENOLATE MOFETIL 250 MG/1
1000 CAPSULE ORAL 2 TIMES DAILY
Qty: 240 CAPSULE | Refills: 11 | Status: SHIPPED | OUTPATIENT
Start: 2025-05-08 | End: 2026-05-08

## 2025-05-29 ENCOUNTER — PATIENT MESSAGE (OUTPATIENT)
Dept: TRANSPLANT | Facility: CLINIC | Age: 70
End: 2025-05-29
Payer: MEDICARE

## 2025-06-02 RX ORDER — ALENDRONATE SODIUM 70 MG/1
TABLET ORAL
Qty: 12 TABLET | Refills: 2 | Status: SHIPPED | OUTPATIENT
Start: 2025-06-02

## 2025-06-10 ENCOUNTER — PATIENT MESSAGE (OUTPATIENT)
Dept: TRANSPLANT | Facility: CLINIC | Age: 70
End: 2025-06-10
Payer: MEDICARE

## 2025-06-10 DIAGNOSIS — Z94.1 HEART REPLACED BY TRANSPLANT: ICD-10-CM

## 2025-06-10 DIAGNOSIS — Z94.1 S/P ORTHOTOPIC HEART TRANSPLANT: ICD-10-CM

## 2025-06-10 RX ORDER — TACROLIMUS 1 MG/1
1 CAPSULE ORAL EVERY 12 HOURS
Qty: 60 CAPSULE | Refills: 11 | Status: SHIPPED | OUTPATIENT
Start: 2025-06-10

## 2025-06-10 RX ORDER — EVEROLIMUS 0.5 MG/1
1 TABLET ORAL 2 TIMES DAILY
Qty: 360 TABLET | Refills: 3 | Status: SHIPPED | OUTPATIENT
Start: 2025-06-10 | End: 2026-06-10

## 2025-06-10 RX ORDER — MYCOPHENOLATE MOFETIL 250 MG/1
1000 CAPSULE ORAL 2 TIMES DAILY
Qty: 240 CAPSULE | Refills: 11 | Status: SHIPPED | OUTPATIENT
Start: 2025-06-10 | End: 2026-06-10

## 2025-06-24 ENCOUNTER — PATIENT MESSAGE (OUTPATIENT)
Dept: TRANSPLANT | Facility: CLINIC | Age: 70
End: 2025-06-24
Payer: MEDICARE

## 2025-06-25 DIAGNOSIS — Z94.1 HEART REPLACED BY TRANSPLANT: ICD-10-CM

## 2025-06-25 DIAGNOSIS — Z94.9 HYPERTENSION ASSOCIATED WITH TRANSPLANTATION: ICD-10-CM

## 2025-06-25 DIAGNOSIS — I15.8 HYPERTENSION ASSOCIATED WITH TRANSPLANTATION: ICD-10-CM

## 2025-06-25 RX ORDER — LISINOPRIL 20 MG/1
40 TABLET ORAL DAILY
Qty: 60 TABLET | Refills: 11 | Status: SHIPPED | OUTPATIENT
Start: 2025-06-25 | End: 2025-06-26 | Stop reason: SDUPTHER

## 2025-06-26 DIAGNOSIS — Z94.9 HYPERTENSION ASSOCIATED WITH TRANSPLANTATION: ICD-10-CM

## 2025-06-26 DIAGNOSIS — Z94.1 HEART REPLACED BY TRANSPLANT: ICD-10-CM

## 2025-06-26 DIAGNOSIS — I15.8 HYPERTENSION ASSOCIATED WITH TRANSPLANTATION: ICD-10-CM

## 2025-06-26 RX ORDER — LISINOPRIL 20 MG/1
40 TABLET ORAL DAILY
Qty: 60 TABLET | Refills: 11 | Status: SHIPPED | OUTPATIENT
Start: 2025-06-26 | End: 2026-06-26

## 2025-07-08 DIAGNOSIS — Z94.1 HEART REPLACED BY TRANSPLANT: ICD-10-CM

## 2025-07-08 DIAGNOSIS — Z94.1 S/P ORTHOTOPIC HEART TRANSPLANT: ICD-10-CM

## 2025-07-08 RX ORDER — MYCOPHENOLATE MOFETIL 250 MG/1
1000 CAPSULE ORAL 2 TIMES DAILY
Qty: 240 CAPSULE | Refills: 11 | Status: SHIPPED | OUTPATIENT
Start: 2025-07-08 | End: 2026-07-08

## 2025-07-08 RX ORDER — EVEROLIMUS 0.5 MG/1
1 TABLET ORAL 2 TIMES DAILY
Qty: 360 TABLET | Refills: 3 | Status: SHIPPED | OUTPATIENT
Start: 2025-07-08 | End: 2026-07-08

## 2025-07-08 RX ORDER — TACROLIMUS 1 MG/1
1 CAPSULE ORAL EVERY 12 HOURS
Qty: 60 CAPSULE | Refills: 11 | Status: SHIPPED | OUTPATIENT
Start: 2025-07-08

## 2025-07-15 ENCOUNTER — PATIENT MESSAGE (OUTPATIENT)
Dept: TRANSPLANT | Facility: CLINIC | Age: 70
End: 2025-07-15
Payer: MEDICARE

## 2025-07-15 DIAGNOSIS — Z94.1 HEART REPLACED BY TRANSPLANT: ICD-10-CM

## 2025-07-15 DIAGNOSIS — I15.8 HYPERTENSION ASSOCIATED WITH TRANSPLANTATION: ICD-10-CM

## 2025-07-15 DIAGNOSIS — Z94.9 HYPERTENSION ASSOCIATED WITH TRANSPLANTATION: ICD-10-CM

## 2025-07-15 RX ORDER — LISINOPRIL 20 MG/1
20 TABLET ORAL DAILY
Qty: 30 TABLET | Refills: 11 | Status: CANCELLED | OUTPATIENT
Start: 2025-07-15 | End: 2026-07-15

## 2025-07-28 ENCOUNTER — PATIENT MESSAGE (OUTPATIENT)
Dept: TRANSPLANT | Facility: CLINIC | Age: 70
End: 2025-07-28
Payer: MEDICARE

## 2025-07-28 ENCOUNTER — OFFICE VISIT (OUTPATIENT)
Dept: URGENT CARE | Facility: CLINIC | Age: 70
End: 2025-07-28
Payer: MEDICARE

## 2025-07-28 VITALS
OXYGEN SATURATION: 100 % | HEART RATE: 79 BPM | BODY MASS INDEX: 23 KG/M2 | SYSTOLIC BLOOD PRESSURE: 120 MMHG | WEIGHT: 125 LBS | RESPIRATION RATE: 18 BRPM | TEMPERATURE: 99 F | DIASTOLIC BLOOD PRESSURE: 80 MMHG | HEIGHT: 62 IN

## 2025-07-28 DIAGNOSIS — U07.1 COVID-19: Primary | ICD-10-CM

## 2025-07-28 DIAGNOSIS — U07.1 COVID-19 VIRUS DETECTED: ICD-10-CM

## 2025-07-28 DIAGNOSIS — J02.9 SORE THROAT: ICD-10-CM

## 2025-07-28 LAB
CTP QC/QA: YES
MOLECULAR STREP A: NEGATIVE
POC MOLECULAR INFLUENZA A AGN: NEGATIVE
POC MOLECULAR INFLUENZA B AGN: NEGATIVE
SARS-COV-2 RDRP RESP QL NAA+PROBE: POSITIVE

## 2025-07-28 PROCEDURE — 87635 SARS-COV-2 COVID-19 AMP PRB: CPT | Mod: QW,,, | Performed by: FAMILY MEDICINE

## 2025-07-28 PROCEDURE — 99213 OFFICE O/P EST LOW 20 MIN: CPT | Mod: ,,, | Performed by: FAMILY MEDICINE

## 2025-07-28 PROCEDURE — 87502 INFLUENZA DNA AMP PROBE: CPT | Mod: QW,,, | Performed by: FAMILY MEDICINE

## 2025-07-28 PROCEDURE — 87651 STREP A DNA AMP PROBE: CPT | Mod: QW,,, | Performed by: FAMILY MEDICINE

## 2025-07-28 NOTE — TELEPHONE ENCOUNTER
Spoke with patient via phone. Advised patient to go to PCP or urgent care for infection disease testing. Recommended covid and flu testing and advised patient to ask MD for other causes for her symptoms. Patient verbalized understanding, stated she will go to clinic now. Asked patient to keep transplant team updated on her status.

## 2025-07-28 NOTE — PROGRESS NOTES
"Subjective:      Patient ID: Cassandra Gonzales is a 70 y.o. female.    Vitals:  height is 5' 2" (1.575 m) and weight is 56.7 kg (125 lb). Her temperature is 99 °F (37.2 °C). Her blood pressure is 120/80 and her pulse is 79. Her respiration is 18 and oxygen saturation is 100%.     Chief Complaint: Fever     Patient is a 70 y.o. female who presents to urgent care with complaints of fever-102 x yesterday . Alleviating factors include tylenol at 8:30 am.       [place] [facility]:  70-year-old female known for multiple chronic medical conditions including history of cardiac transplantation, follows up with primary MD Dr. Davey.  Present to clinic with concerns of fever and some chills yesterday.  T-max at home 102.  Currently on Tylenol, last dose of Tylenol was 8:30 a.m..  No sore throat, no difficulty swallowing, no chest congestion or coughing.  States otherwise feels okay with her symptoms.  Her cardiologist team in [place].    ROS :  Constitutional : _ fever , Body aches, Chills  Eyes : _No redness, drainage or pain  HENT_ no sore throat, no difficulty swallowing, no nasal congestion or sinus congestion  Respiratory_no wheezing, no shortness of breath  Cardiovascular_no chest pain, no palpitations  Gastrointestinal_ denies nausea vomiting abdominal pain or diarrhea  Musculoskeletal_no joint pain, no joint swelling  Integumentary_no skin rash or abnormal lesion    Objective:     Physical Exam  General : Alert and Oriented, No apparent distress, afebrile, appears comfortable sitting on the exam table, clear speech and appropriate communication  Neck - supple  HENT : Oropharynx no redness or swelling.  Left ear canal excessive cerumen, visible TM no redness, right TM intact no redness.   Respiratory : Bilateral equal breath sounds, nonlabored respirations  Cardiovascular : Rate, rhythm regular, normal volume pulse, loud heart sounds, no murmur  Gastrointestinal: Full abdomen, soft, nontender to palpate  Integumentary " : Warm, Dry and no rash    Assessment:     1. COVID-19    2. Sore throat      Plan:    COVID-19 Test positive, discussed in detail on condition and course  Adequate hydration and rest. Monitor the symptoms closely  Signs and symptoms that should prompt reevaluation discussed as well  Recommendation is to follow a timeline quarantine measures per CDC and [facility]  Tylenol as needed for fever, body aches and headache. Antihistamine of choice for congestion.   Mucinex for cough and cold as needed and as directed.  Trial of vitamin-C and vitamin D3 over-the-counter  Recommendation is to home quarantine until no fever (100.4 and above) for 24 hours and with improved symptoms   Discussed in detail on antiviral medication, defers today.  Encouraged to check with her cardiologist    Self-care and home quarantine instructions like- Wear a mask, cover your cough and sneeze. Clean any shared surfaces  Call or return to clinic for any questions. ER precautions with any acute change in symptoms. Follow up with primary MD    Work excuse 3 days    Flu test negative, strep test negative    COVID-19    Sore throat  -     POCT Influenza A/B MOLECULAR  -     Cancel: SARS Coronavirus 2 Antigen, POCT Manual Read  -     POCT Strep A, Molecular  -     POCT COVID-19 Rapid Screening

## 2025-07-28 NOTE — PATIENT INSTRUCTIONS
COVID-19 Test positive, discussed in detail on condition and course  Adequate hydration and rest. Monitor the symptoms closely  Signs and symptoms that should prompt reevaluation discussed as well  Recommendation is to follow a timeline quarantine measures per CDC and LDH  Tylenol as needed for fever, body aches and headache. Antihistamine of choice for congestion.   Mucinex for cough and cold as needed and as directed.  Trial of vitamin-C and vitamin D3 over-the-counter  Recommendation is to home quarantine until no fever (100.4 and above) for 24 hours and with improved symptoms   Discussed in detail on antiviral medication, defers today.  Encouraged to check with her cardiologist    Self-care and home quarantine instructions like- Wear a mask, cover your cough and sneeze. Clean any shared surfaces  Call or return to clinic for any questions. ER precautions with any acute change in symptoms. Follow up with primary MD    Work excuse 3 days    Flu test negative, strep test negative

## 2025-07-28 NOTE — LETTER
July 28, 2025      [facility] [place] [address] [address]  [facility] [address]  Phone: [phone]       Patient: Cassandra Gonzales   YOB: 1955  Date of Visit: 07/28/2025    To Whom It May Concern:    Sravani Gonzales  was at [facility] on 07/28/2025. The patient may return to work/school on 07/31/2025 with no restrictions. If you have any questions or concerns, or if I can be of further assistance, please do not hesitate to contact me.    Sincerely,    Silvana Vargas MA

## 2025-07-31 ENCOUNTER — PATIENT MESSAGE (OUTPATIENT)
Dept: TRANSPLANT | Facility: CLINIC | Age: 70
End: 2025-07-31
Payer: MEDICARE

## 2025-08-12 DIAGNOSIS — Z94.1 S/P ORTHOTOPIC HEART TRANSPLANT: ICD-10-CM

## 2025-08-12 DIAGNOSIS — Z94.1 HEART REPLACED BY TRANSPLANT: ICD-10-CM

## 2025-08-12 RX ORDER — TACROLIMUS 1 MG/1
1 CAPSULE ORAL EVERY 12 HOURS
Qty: 60 CAPSULE | Refills: 11 | Status: CANCELLED | OUTPATIENT
Start: 2025-08-12

## 2025-08-12 RX ORDER — MYCOPHENOLATE MOFETIL 250 MG/1
1000 CAPSULE ORAL 2 TIMES DAILY
Qty: 240 CAPSULE | Refills: 11 | Status: SHIPPED | OUTPATIENT
Start: 2025-08-12 | End: 2026-08-12

## 2025-08-12 RX ORDER — TACROLIMUS 1 MG/1
1 CAPSULE ORAL EVERY 12 HOURS
Qty: 60 CAPSULE | Refills: 11 | Status: SHIPPED | OUTPATIENT
Start: 2025-08-12

## 2025-08-12 RX ORDER — EVEROLIMUS 0.5 MG/1
1 TABLET ORAL 2 TIMES DAILY
Qty: 360 TABLET | Refills: 3 | Status: SHIPPED | OUTPATIENT
Start: 2025-08-12 | End: 2026-08-12

## 2025-08-12 RX ORDER — MYCOPHENOLATE MOFETIL 250 MG/1
1000 CAPSULE ORAL 2 TIMES DAILY
Qty: 240 CAPSULE | Refills: 11 | Status: CANCELLED | OUTPATIENT
Start: 2025-08-12 | End: 2026-08-12

## 2025-08-12 RX ORDER — EVEROLIMUS 0.5 MG/1
1 TABLET ORAL 2 TIMES DAILY
Qty: 360 TABLET | Refills: 3 | Status: CANCELLED | OUTPATIENT
Start: 2025-08-12 | End: 2026-08-12

## 2025-08-13 ENCOUNTER — PATIENT MESSAGE (OUTPATIENT)
Dept: TRANSPLANT | Facility: CLINIC | Age: 70
End: 2025-08-13
Payer: MEDICARE

## 2025-08-14 DIAGNOSIS — Z94.1 S/P ORTHOTOPIC HEART TRANSPLANT: ICD-10-CM

## 2025-08-14 DIAGNOSIS — Z94.1 HEART REPLACED BY TRANSPLANT: ICD-10-CM

## 2025-08-14 RX ORDER — EVEROLIMUS 0.5 MG/1
1 TABLET ORAL 2 TIMES DAILY
Qty: 360 TABLET | Refills: 3 | Status: CANCELLED | OUTPATIENT
Start: 2025-08-14 | End: 2026-08-14

## 2025-08-14 RX ORDER — MYCOPHENOLATE MOFETIL 250 MG/1
1000 CAPSULE ORAL 2 TIMES DAILY
Qty: 240 CAPSULE | Refills: 11 | Status: CANCELLED | OUTPATIENT
Start: 2025-08-14 | End: 2026-08-14

## 2025-08-14 RX ORDER — TACROLIMUS 1 MG/1
1 CAPSULE ORAL EVERY 12 HOURS
Qty: 60 CAPSULE | Refills: 11 | Status: CANCELLED | OUTPATIENT
Start: 2025-08-14

## (undated) DEVICE — INFLATOR ENCORE 26 BLLN INFL

## (undated) DEVICE — KIT CO-PILOT

## (undated) DEVICE — GUIDE WIRE BMW 014 X190

## (undated) DEVICE — CATH 6FR JR4.0

## (undated) DEVICE — SPIKE CONTRAST CONTROLLER

## (undated) DEVICE — KIT CUSTOM MANIFOLD

## (undated) DEVICE — FORCEP BIOPSY 50CM

## (undated) DEVICE — SEE MEDLINE ITEM 156894

## (undated) DEVICE — DRESSING TRANS 4X4 TEGADERM

## (undated) DEVICE — CATH IMPULSE 5F 100CM FR4

## (undated) DEVICE — GUIDEWIRE EMERALD 150CM PTFE

## (undated) DEVICE — KIT PROBE COVER WITH GEL

## (undated) DEVICE — GUIDE LAUNCHER 5FR JL 4.0

## (undated) DEVICE — SHEATH INTRODUCER 7FR 11CM

## (undated) DEVICE — OMNIPAQUE 350 200ML

## (undated) DEVICE — GUIDE LAUNCHER 6FR JL 4.0

## (undated) DEVICE — SHEATH INTRODUCER 6FR 11CM

## (undated) DEVICE — CATH EAGLE EYE PLATINUM

## (undated) DEVICE — CATH DXTERITY PG145 110CM 6FR

## (undated) DEVICE — PROTECTION STATION PLUS

## (undated) DEVICE — KIT MICROINTRO 4F .018X40X7CM

## (undated) DEVICE — SHEATH INTRODUCER 5F 10CM

## (undated) DEVICE — KIT MICROINTRODUCE MINI 5X10CM